# Patient Record
Sex: MALE | Race: WHITE | NOT HISPANIC OR LATINO | Employment: FULL TIME | URBAN - METROPOLITAN AREA
[De-identification: names, ages, dates, MRNs, and addresses within clinical notes are randomized per-mention and may not be internally consistent; named-entity substitution may affect disease eponyms.]

---

## 2018-06-03 ENCOUNTER — HOSPITAL ENCOUNTER (EMERGENCY)
Facility: MEDICAL CENTER | Age: 28
End: 2018-06-03
Attending: EMERGENCY MEDICINE
Payer: MEDICAID

## 2018-06-03 ENCOUNTER — HOSPITAL ENCOUNTER (EMERGENCY)
Facility: MEDICAL CENTER | Age: 28
End: 2018-06-03
Payer: MEDICAID

## 2018-06-03 VITALS
SYSTOLIC BLOOD PRESSURE: 131 MMHG | HEART RATE: 102 BPM | TEMPERATURE: 99.2 F | RESPIRATION RATE: 18 BRPM | DIASTOLIC BLOOD PRESSURE: 84 MMHG | OXYGEN SATURATION: 100 %

## 2018-06-03 VITALS
TEMPERATURE: 98.4 F | HEIGHT: 70 IN | SYSTOLIC BLOOD PRESSURE: 149 MMHG | RESPIRATION RATE: 14 BRPM | BODY MASS INDEX: 21.27 KG/M2 | WEIGHT: 148.59 LBS | HEART RATE: 104 BPM | DIASTOLIC BLOOD PRESSURE: 81 MMHG | OXYGEN SATURATION: 100 %

## 2018-06-03 DIAGNOSIS — F11.20 OPIATE DEPENDENCE (HCC): ICD-10-CM

## 2018-06-03 DIAGNOSIS — F19.20 ADDICTION TO DRUG (HCC): ICD-10-CM

## 2018-06-03 PROCEDURE — 302449 STATCHG TRIAGE ONLY (STATISTIC)

## 2018-06-03 PROCEDURE — 99284 EMERGENCY DEPT VISIT MOD MDM: CPT

## 2018-06-03 RX ORDER — BUPRENORPHINE 2 MG/1
2 TABLET SUBLINGUAL DAILY
Qty: 5 TAB | Refills: 0 | Status: SHIPPED | OUTPATIENT
Start: 2018-06-03 | End: 2018-06-08

## 2018-06-03 ASSESSMENT — PAIN SCALES - GENERAL
PAINLEVEL_OUTOF10: 0
PAINLEVEL_OUTOF10: 0

## 2018-06-03 NOTE — DISCHARGE PLANNING
"Call from  that pt has returned with script and was unable to fill it due to \"OSMIN\" number.  This CM looked on physician roster and did not see Dr. Kaiser was still on duty so took script to another MD who stated it was illegal to write for this script unless pain management or mental health.  Found pt out front and explained this and that he would need to be re seen.  In interim ERE MD informed this CM that Dr. Kaiser WAS [facility].  Went to speak to Dr. Kaiser and found pt, per Dr. Kaiser he did not need to be checked back in, updated triage RN and charge.  "

## 2018-06-03 NOTE — ED TRIAGE NOTES
"Luther Forrester  Chief Complaint   Patient presents with   • Medication Refill       Pt ambulatory to triage with above complaint.  Pt states he was discharged from [facility] Thursday and prescription was not sent to pharmacy. Pt states he went back to [facility] and they were not able to resend the prescription.  Pt states he was supposed to be discharged with 2 mg BID of Subutex.     No other complaints. Denies SI HI    Pt returned to lobby, educated on triage process, and to inform staff of any changes or concerns.      Blood Pressure: 149/81, Pulse: (!) 104, Respiration: 14, Temperature: 36.9 °C (98.4 °F), Height: 177.8 cm (5' 10\"), Weight: 67.4 kg (148 lb 9.4 oz), Pulse Oximetry: 100 %      "

## 2018-06-03 NOTE — ED PROVIDER NOTES
"ED Provider Note    CHIEF COMPLAINT  Chief Complaint   Patient presents with   • Medication Refill       HPI  Luther Forrester is a 27 y.o. male who presents asking for a refill on his Suboxone. The patient was released from [facility] on Thursday and he states he lost his prescription. The patient went back to [facility] and they could not reprint the prescription as the physician was not at their facility. The patient's being treated for heroin withdrawals. I did speak with [facility] and they have confirmed that the patient did come back for a new prescription and this could not be approved until the physician comes back tomorrow. The patient presents in an asymptomatic state except for anxiety.    REVIEW OF SYSTEMS  No hallucinations, no vomiting    PHYSICAL EXAM  VITAL SIGNS: /81   Pulse (!) 104   Temp 36.9 °C (98.4 °F) (Temporal)   Resp 14   Ht 1.778 m (5' 10\")   Wt 67.4 kg (148 lb 9.4 oz)   SpO2 100%   BMI 21.32 kg/m²   In general the patient appears anxious but in no acute distress  Pulmonary chest clear to auscultation bilaterally  Cardiovascular S1 and S2 with a slightly tachycardic rate  Psychiatric examination is within normal limits with no hallucinations nor homicidal nor suicidal ideation      COURSE & MEDICAL DECISION MAKING  Pertinent Labs & Imaging studies reviewed. (See chart for details)  Is a 27-year-old male who presents to the emergency department needing a refill on his Suboxone. I did speak with [facility] as mentioned above and I agree to give the patient 5 tablets to help him out through the next few days until he can get back into get a long-term prescription.    FINAL IMPRESSION  1. Heroin withdrawal        Disposition  The patient will be discharged in stable condition      Electronically signed by: Jose Gudaalupe Kaiser, 6/3/2018 2:40 PM      "

## 2018-06-03 NOTE — DISCHARGE INSTRUCTIONS
Drug Abuse, Frequently Asked Questions  Drug addiction is a complex brain disease. It is characterized by compulsive, at times uncontrollable, drug craving, seeking, and use that persists even in the face of extremely negative results. Drug seeking becomes compulsive, in large part as a result of the effects of prolonged drug use on brain functioning and, thus, on behavior. For many people, drug addiction becomes chronic, with relapses possible even after long periods of being off the drug.  HOW QUICKLY CAN I BECOME ADDICTED TO A DRUG?  There is no easy answer to this. If and how quickly you might become addicted to a drug depends on many factors including the biology of your body. All drugs are potentially harmful and may have life-threatening consequences associated with their use. There are also vast differences among individuals in sensitivity to various drugs. While one person may use a drug many times and suffer no ill effects, another person may be particularly vulnerable and overdose or developing a craving with the first use. There is no way of knowing in advance how someone may react.  HOW DO I KNOW IF SOMEONE IS ADDICTED TO DRUGS?  If a person is compulsively seeking and using a drug despite negative consequences (such as loss of job, debt, physical problems brought on by drug abuse, or family problems) then he or she is probably addicted. Those who screen for drug problems, such as physicians, have developed the CAGE questionnaire. These four simple questions can help detect substance abuse problems:  · Have you ever felt you ought to Cut down on your drinking/drug use?   · Have people ever Annoyed you by criticizing your drinking/drug use?   · Have you ever felt bad or Guilty about your drinking/drug use?   · Have you ever had a drink or taken a drug first thing in the morning to steady your nerves or get rid of a hangover (Eye-opener)?   WHAT ARE THE PHYSICAL SIGNS OF ABUSE OR ADDICTION?  The physical  "signs of abuse or addiction can vary depending on the person and the drug being abused. For example, someone who abuses marijuana may have a chronic cough or worsening of asthmatic conditions. THC, the chemical in marijuana responsible for producing its effects, is associated with weakening the immune system which makes the user more vulnerable to infections, such as pneumonia. Each drug has short-term and long-term physical effects. Stimulants like cocaine increase heart rate and blood pressure, whereas opioids like heroin may slow the heart rate and reduce breathing (respiration).   ARE THERE EFFECTIVE TREATMENTS FOR DRUG ADDICTION?  Drug addiction can be effectively treated with behavioral-based therapies and, for addiction to some drugs such as heroin or nicotine, medications may be used. Treatment may vary for each person depending on the type of drug(s) being used and multiple courses of treatment may be needed to achieve success. Research has revealed 13 basic principles that underlie effective drug addiction treatment. These are discussed in [employer]'s Principles of Drug Addiction Treatment: A Research-Based Guide.  WHERE CAN I FIND INFORMATION ABOUT DRUG TREATMENT PROGRAMS?  · For referrals to treatment programs, visit the Substance Abuse and Mental Health Services Administration online at http://findtreatment.sama.gov/.   · [employer] publishes an expanding series of treatment manuals, the \"clinical toolbox,\" that gives drug treatment providers research-based information for creating effective treatment programs.   WHAT IS DETOXIFICATION, OR \"DETOX\"?  Detoxification is the process of allowing the body to rid itself of a drug while managing the symptoms of withdrawal. It is often the first step in a drug treatment program and should be followed by treatment with a behavioral-based therapy and/or a medication, if available. Detox alone with no follow-up is not treatment.   WHAT IS WITHDRAWAL? HOW LONG DOES IT " LAST?  Withdrawal is the variety of symptoms that occur after use of some addictive drugs is reduced or stopped. Length of withdrawal and symptoms vary with the type of drug. For example, physical symptoms of heroin withdrawal may include restlessness, muscle and bone pain, insomnia, diarrhea, vomiting, and cold flashes. These physical symptoms may last for several days, but the general depression, or dysphoria (opposite of euphoria), that often accompanies heroin withdrawal, may last for weeks. In many cases withdrawal can be easily treated with medications to ease the symptoms. But treating withdrawal is not the same as treating addiction.   WHAT ARE THE COSTS OF DRUG ABUSE TO SOCIETY?  Beyond the raw numbers are other costs to society:  · Spread of infectious diseases such as HIV/AIDS and hepatitis C either through sharing of drug paraphernalia or unprotected sex.   · Deaths due to overdose or other complications from drug use.   · Effects on unborn children of pregnant drug users.   · Other effects such as crime and homelessness.   IF A PREGNANT WOMAN ABUSES DRUGS, DOES IT AFFECT THE FETUS?  · Many substances including alcohol, nicotine, and drugs of abuse can have negative effects on the developing fetus because they are transferred to the fetus across the placenta. For example, nicotine has been connected with premature birth and low birth weight, as has the use of cocaine. Scientific studies have shown that babies born to marijuana users were shorter, weighed less, and had smaller head sizes than those born to mothers who did not use the drug. Smaller babies are more likely to develop health problems.   · Whether a baby's health problems, if caused by a drug, will continue as the child grows, is not always known. Research does show that children born to mothers who used marijuana regularly during pregnancy may have trouble concentrating, when older. Our research continues to produce insights on the negative  effects of drug use on the fetus.   Document Released: 12/20/2004 Document Revised: 03/11/2013 Document Reviewed: 03/19/2010  [employer]® Patient Information ©2013 [employer].

## 2018-06-19 ENCOUNTER — OFFICE VISIT (OUTPATIENT)
Dept: MEDICAL GROUP | Facility: MEDICAL CENTER | Age: 28
End: 2018-06-19
Attending: FAMILY MEDICINE
Payer: MEDICAID

## 2018-06-19 VITALS
RESPIRATION RATE: 14 BRPM | DIASTOLIC BLOOD PRESSURE: 65 MMHG | HEIGHT: 70 IN | OXYGEN SATURATION: 98 % | SYSTOLIC BLOOD PRESSURE: 115 MMHG | HEART RATE: 76 BPM | WEIGHT: 160 LBS | TEMPERATURE: 98.8 F | BODY MASS INDEX: 22.9 KG/M2

## 2018-06-19 DIAGNOSIS — G47.00 INSOMNIA, UNSPECIFIED TYPE: ICD-10-CM

## 2018-06-19 DIAGNOSIS — F19.10 SUBSTANCE ABUSE (HCC): ICD-10-CM

## 2018-06-19 DIAGNOSIS — F41.1 GENERALIZED ANXIETY DISORDER: ICD-10-CM

## 2018-06-19 PROCEDURE — 99202 OFFICE O/P NEW SF 15 MIN: CPT | Performed by: FAMILY MEDICINE

## 2018-06-19 PROCEDURE — 99203 OFFICE O/P NEW LOW 30 MIN: CPT | Performed by: FAMILY MEDICINE

## 2018-06-19 RX ORDER — QUETIAPINE FUMARATE 50 MG/1
50 TABLET, FILM COATED ORAL NIGHTLY PRN
Qty: 30 TAB | Refills: 6 | Status: SHIPPED | OUTPATIENT
Start: 2018-06-19 | End: 2020-09-02

## 2018-06-19 RX ORDER — CITALOPRAM 20 MG/1
20 TABLET ORAL DAILY
Qty: 30 TAB | Refills: 3 | Status: SHIPPED | OUTPATIENT
Start: 2018-06-19 | End: 2022-12-23

## 2018-06-19 ASSESSMENT — ENCOUNTER SYMPTOMS
ABDOMINAL PAIN: 0
SPUTUM PRODUCTION: 0
COUGH: 0
NAUSEA: 0
SHORTNESS OF BREATH: 0
CHILLS: 0
DEPRESSION: 0
MUSCULOSKELETAL NEGATIVE: 1
FEVER: 0
NERVOUS/ANXIOUS: 1
MEMORY LOSS: 0
NEUROLOGICAL NEGATIVE: 1
INSOMNIA: 1
VOMITING: 0
PALPITATIONS: 0
HALLUCINATIONS: 0
EYES NEGATIVE: 1

## 2018-06-19 ASSESSMENT — PATIENT HEALTH QUESTIONNAIRE - PHQ9: CLINICAL INTERPRETATION OF PHQ2 SCORE: 0

## 2018-06-19 ASSESSMENT — LIFESTYLE VARIABLES: SUBSTANCE_ABUSE: 1

## 2018-06-19 NOTE — PROGRESS NOTES
"Subjective:      Luther Forrester is a 27 y.o. male who presents with Establish Care and Referral Needed            Patient 27-year-old male here to establish the clinic today. He has a recent history of substance abuse for which she had been in [place] for. While in [place] he had been started on Suboxone as well as Vivatrol. Since he has been discharged from the facility he has been unable to find a provider to continue filling his medication. Today will refer to psychiatry so pt can try to establish with a provider able to fill these medications for him.   He is also being treated for an anxiety disorder with celexa at 20 mg and using seroquel for sleep. He is not having any urges to harm himself or others. ER precautions given to pt in case he is having any worsening of his sxs.  Will continue to follow.    He has no other medical issues.    His only surgical history includes a tonsillectomy and dental extraction.     His family medical history is non contributory.    He is currently unemployed.    He smokes tobacco, denies drinking alcohol and is a former heroine user (stopped May 27)        Review of Systems   Constitutional: Negative for chills and fever.   HENT: Negative for hearing loss and tinnitus.    Eyes: Negative.    Respiratory: Negative for cough, sputum production and shortness of breath.    Cardiovascular: Negative for chest pain and palpitations.   Gastrointestinal: Negative for abdominal pain, nausea and vomiting.   Genitourinary: Negative.    Musculoskeletal: Negative.    Skin: Negative for rash.   Neurological: Negative.    Endo/Heme/Allergies: Negative.    Psychiatric/Behavioral: Positive for substance abuse. Negative for depression, hallucinations, memory loss and suicidal ideas. The patient is nervous/anxious and has insomnia.           Objective:     /65   Pulse 76   Temp 37.1 °C (98.8 °F)   Resp 14   Ht 1.778 m (5' 10\")   Wt 72.6 kg (160 lb)   SpO2 98%   BMI 22.96 " kg/m²      Physical Exam   Constitutional: He is oriented to person, place, and time. He appears well-developed and well-nourished.   HENT:   Head: Normocephalic and atraumatic.   Nose: Nose normal.   Mouth/Throat: Oropharynx is clear and moist.   Eyes: Conjunctivae and EOM are normal. Pupils are equal, round, and reactive to light.   Neck: Normal range of motion. Neck supple. No thyromegaly present.   Cardiovascular: Normal rate, regular rhythm and normal heart sounds.  Exam reveals no friction rub.    No murmur heard.  Pulmonary/Chest: Effort normal and breath sounds normal. No respiratory distress. He has no wheezes. He has no rales.   Abdominal: Soft. Bowel sounds are normal. He exhibits no distension. There is no tenderness.   Musculoskeletal: Normal range of motion.   Lymphadenopathy:     He has no cervical adenopathy.   Neurological: He is alert and oriented to person, place, and time.   Skin: Skin is warm and dry.   Psychiatric: He has a normal mood and affect. His behavior is normal.   Nursing note and vitals reviewed.              Assessment/Plan:     1. Substance abuse  Will refer to psychiatry for assistance in managing his suboxone and vivatrol. Will continue to follow.  - REFERRAL TO PSYCHIATRY    2. Insomnia, unspecified type  Will have him continue to take his seroquel as previously prescribed. Will continue to follow.  - quetiapine (SEROQUEL) 50 MG tablet; Take 1 Tab by mouth at bedtime as needed.  Dispense: 30 Tab; Refill: 6    3. Generalized anxiety disorder  Will have him continue to take his celexa as directed. Will continue to follow.  - citalopram (CELEXA) 20 MG Tab; Take 1 Tab by mouth every day.  Dispense: 30 Tab; Refill: 3

## 2018-11-09 ENCOUNTER — HOSPITAL ENCOUNTER (EMERGENCY)
Facility: MEDICAL CENTER | Age: 28
End: 2018-11-09
Attending: EMERGENCY MEDICINE
Payer: MEDICAID

## 2018-11-09 VITALS
DIASTOLIC BLOOD PRESSURE: 65 MMHG | WEIGHT: 147.71 LBS | SYSTOLIC BLOOD PRESSURE: 113 MMHG | OXYGEN SATURATION: 97 % | TEMPERATURE: 98 F | HEIGHT: 70 IN | HEART RATE: 87 BPM | RESPIRATION RATE: 16 BRPM | BODY MASS INDEX: 21.15 KG/M2

## 2018-11-09 DIAGNOSIS — F11.20 HEROIN DEPENDENCE (HCC): ICD-10-CM

## 2018-11-09 PROCEDURE — 99284 EMERGENCY DEPT VISIT MOD MDM: CPT

## 2018-11-09 ASSESSMENT — PAIN SCALES - GENERAL: PAINLEVEL_OUTOF10: 0

## 2018-11-10 NOTE — ED PROVIDER NOTES
"ED Provider Note    Scribed for ALCIRA Hernandez II* by Arsenio Griffiths. 11/9/2018  6:30 PM    Means of Arrival: Walk-in  History obtained by: Patient  Limitations: None    CHIEF COMPLAINT  Chief Complaint   Patient presents with   • Medical Clearance     to go to [facility]  Luther Forrester is a 28 y.o. male who presents to the Emergency Department for medical clearance prior to admittance to [facility] for Heroin and methamphetamine rehabilitation. He is currently on methadone and he will be on methadone while admitted to [facility]. He has been to rehabilitation in the past. He has not other complaints.    REVIEW OF SYSTEMS  Review of Systems   Constitutional:        Medical clearance   All other systems reviewed and are negative.  See [facility] for further details.     SOCIAL HISTORY  Social History     Social History Main Topics   • Smoking status: Current Every Day Smoker     Packs/day: 0.50   • Smokeless tobacco: Former User     Types: Chew     Quit date: 5/9/2018      Comment: occassionally on chew 1-2 times a day   • Alcohol use No   • Drug use: No      Comment: heroin, last use 2 days ago            SURGICAL HISTORY   has a past surgical history that includes dental extraction(s) and tonsillectomy.    CURRENT MEDICATIONS  Home Medications     Reviewed by Sarah Edgar R.N. (Registered Nurse) on 11/09/18 at 1727  Med List Status: Complete   Medication Last Dose Status   citalopram (CELEXA) 20 MG Tab 11/9/2018 Active   quetiapine (SEROQUEL) 50 MG tablet 11/9/2018 Active                ALLERGIES  No Known Allergies    PHYSICAL EXAM  VITAL SIGNS: /63   Pulse 85   Temp 36.5 °C (97.7 °F)   Resp 18   Ht 1.778 m (5' 10\")   Wt 67 kg (147 lb 11.3 oz)   SpO2 95%   BMI 21.19 kg/m²    Pulse ox interpretation: I interpret this pulse ox as normal.  Constitutional: Alert in no apparent distress. Sitting comfortably in bed.  HENT: Normocephalic, Atraumatic, Bilateral external ears " normal. Nose normal.   Eyes: Pupils are equal. Conjunctiva normal, non-icteric.   Lungs: No respiratory distress, regular respirations.  Skin: Warm, Dry, No erythema, No rash.   Neurologic: Alert, Grossly non-focal. No slurred speech. Moving extremities normally.   Psychiatric: Affect normal, Judgment normal, Mood normal, Appears appropriate and not intoxicated.     COURSE & MEDICAL DECISION MAKING  Pertinent Labs & Imaging studies reviewed. (See chart for details)    6:30 PM This is an emergent evaluation of a 28 y.o., male who presents for medical clearance to be able to go to [facility] for detox treatment.  He has no acute complaints.  He has a piece of paper that asks for current vital signs and to list any prescriptions that I plan on writing, and to check off any contraindicated over-the-counter meds that he may receive.  I completed the paperwork.  I am not going to write any prescriptions.  The over-the-counter medications that have available there seem appropriate for withdrawal.  He also is taking methadone less likely to have major withdrawal symptoms.  He is going to the rehab/detox facility for heroin dependence.    The patient will return for worsening symptoms and is stable at the time of discharge. The patient verbalizes understanding and will comply.    DISPOSITION:  Patient will be discharged to [facility] in stable condition.    FOLLOW UP:  Go to [facility] for detox. You are medically cleared            FINAL IMPRESSION  1. Heroin dependence (HCC)           Arsenio PIRES (Scribe), am scribing for, and in the presence of, VERO Hernandez II.    Electronically signed by: Arsenio Griffiths (Ashleyibe), 11/9/2018    Raudel PIRES II, M* personally performed the services described in this documentation, as scribed by Arsenio Griffiths in my presence, and it is both accurate and complete. E    The note accurately reflects work and decisions made by me.  Raudel ELI  Sciascia II  11/9/2018  8:16 PM

## 2018-11-10 NOTE — ED TRIAGE NOTES
Pt ambulated to triage with   Chief Complaint   Patient presents with   • Medical Clearance     to go to [facility]     Pt Informed regarding triage process and verbalized understanding to inform triage tech or RN for any changes in condition. Placed in lobby.

## 2018-11-10 NOTE — ED NOTES
Discharge instructions given. All questions answered. Pt to follow-up with rehab. Pt verbalized understanding. All belongings with pt. Pt self ambulatory to lobby. Pt educated not to drive while on narcotics.

## 2018-11-11 ENCOUNTER — HOSPITAL ENCOUNTER (EMERGENCY)
Facility: MEDICAL CENTER | Age: 28
End: 2018-11-11
Attending: EMERGENCY MEDICINE
Payer: MEDICAID

## 2018-11-11 VITALS
TEMPERATURE: 99.2 F | HEART RATE: 95 BPM | SYSTOLIC BLOOD PRESSURE: 120 MMHG | RESPIRATION RATE: 16 BRPM | DIASTOLIC BLOOD PRESSURE: 78 MMHG | BODY MASS INDEX: 20.81 KG/M2 | WEIGHT: 145.06 LBS | OXYGEN SATURATION: 100 %

## 2018-11-11 DIAGNOSIS — Z00.8 MEDICAL CLEARANCE FOR INCARCERATION: ICD-10-CM

## 2018-11-11 PROCEDURE — 99284 EMERGENCY DEPT VISIT MOD MDM: CPT

## 2018-11-11 RX ORDER — METHADONE HYDROCHLORIDE 10 MG/1
30 TABLET ORAL
COMMUNITY
End: 2020-09-02

## 2018-11-11 RX ORDER — HYDROXYZINE HYDROCHLORIDE 25 MG/1
25 TABLET, FILM COATED ORAL 3 TIMES DAILY PRN
Qty: 30 TAB | Refills: 0 | Status: SHIPPED | OUTPATIENT
Start: 2018-11-11 | End: 2018-12-04 | Stop reason: SDUPTHER

## 2018-11-11 ASSESSMENT — LIFESTYLE VARIABLES: DO YOU DRINK ALCOHOL: NO

## 2018-11-11 NOTE — ED TRIAGE NOTES
"Chief Complaint   Patient presents with   • Medical Clearance     Pt reports that he has not had his methadone since Friday, he has also not used meth or heroin since Friday. States that he is detoxing. States that he wants medical clearance to [facility] and came here for \"something for the detox.\"  Pt has no visible tremor, is PWD with VSS. NAD.    Pt was seen here for same 2 days ago per pt report.    Blood pressure 114/59, pulse 85, temperature 37.3 °C (99.2 °F), temperature source Temporal, resp. rate 17, weight 65.8 kg (145 lb 1 oz), SpO2 100 %.    Pt informed of wait times. Educated on triage process.  Asked to return to triage RN for any new or worsening of symptoms. Thanked for patience.        "

## 2018-11-11 NOTE — ED PROVIDER NOTES
ED Provider Note    CHIEF COMPLAINT  Chief Complaint   Patient presents with   • Medical Clearance       HPI  Luther Forrester is a 28 y.o. male who presents for medical clearance to go to [facility].  The patient states that he is already at [facility] and he came here on Friday.  He states he was not prescribed any medication.  They are concerned the patient is an acute heroin withdrawal and they sent him back into the hospital for repeat examination and clearance.  The patient states he does have a lot of anxiety as he is currently going through withdrawals.  He has not had vomiting.  He has not had any fevers.  He has not any hallucinations.    REVIEW OF SYSTEMS  See HPI for further details. All other systems are negative.     PAST MEDICAL HISTORY  History reviewed. No pertinent past medical history.    SOCIAL HISTORY  Social History     Social History   • Marital status: Single     Spouse name: N/A   • Number of children: N/A   • Years of education: N/A     Social History Main Topics   • Smoking status: Current Every Day Smoker     Packs/day: 0.50   • Smokeless tobacco: Former User     Types: Chew     Quit date: 5/9/2018      Comment: occassionally on chew 1-2 times a day   • Alcohol use No   • Drug use: No      Comment: heroin, and meth, last use fridat   • Sexual activity: Not on file     Other Topics Concern   • Not on file     Social History Narrative   • No narrative on file           PHYSICAL EXAM  VITAL SIGNS: /59   Pulse 85   Temp 37.3 °C (99.2 °F) (Temporal)   Resp 17   Wt 65.8 kg (145 lb 1 oz)   SpO2 100%   BMI 20.81 kg/m²   Constitutional: Well developed, Well nourished, No acute distress, Non-toxic appearance.   HENT: Normocephalic, Atraumatic, tympanic membranes are intact and nonerythematous bilaterally, Oropharynx moist without exudates or erythema, Nose normal.   Eyes: PERRLA, EOMI, Conjunctiva normal.  Neck: Supple without meningismus  Lymphatic: No lymphadenopathy noted.    Cardiovascular: Normal heart rate, Normal rhythm, No murmurs, No rubs, No gallops.   Thorax & Lungs: Normal breath sounds, No respiratory distress, No wheezing, No chest tenderness.   Abdomen: Bowel sounds normal, Soft, No tenderness, no rebound, no guarding, no distention, No masses, No pulsatile masses.   Skin: Warm, Dry, No erythema, No rash.   Back: No tenderness, No CVA tenderness.   Extremities: Atraumatic with symmetric distal pulses, No edema, No tenderness, No cyanosis, No clubbing.   Neurologic: Alert & oriented x 3, cranial nerves II through XII are intact, Normal motor function, Normal sensory function, No focal deficits noted.   Psychiatric: Affect normal, Judgment normal, Mood normal.     COURSE & MEDICAL DECISION MAKING  Pertinent Labs & Imaging studies reviewed. (See chart for details)  This a 28-year-old male who presents the emerge part for medical clearance.  He does not clinically appear ill at this time.  I do not appreciate any significant evidence of withdrawal.  The patient will receive a prescription for Atarax to help him sleep.  He will be discharged back to [facility] and is currently medically cleared.    FINAL IMPRESSION  1.  Heroin abuse   2.  Medical clearance        Disposition  The patient will be discharged in stable condition    Electronically signed by: Jose Guadalupe Kaiser, 11/11/2018 2:39 PM

## 2018-11-14 ENCOUNTER — HOSPITAL ENCOUNTER (EMERGENCY)
Facility: MEDICAL CENTER | Age: 28
End: 2018-11-14
Attending: EMERGENCY MEDICINE
Payer: MEDICAID

## 2018-11-14 VITALS
SYSTOLIC BLOOD PRESSURE: 115 MMHG | RESPIRATION RATE: 16 BRPM | TEMPERATURE: 98 F | DIASTOLIC BLOOD PRESSURE: 67 MMHG | OXYGEN SATURATION: 100 % | BODY MASS INDEX: 21.35 KG/M2 | WEIGHT: 148.81 LBS | HEART RATE: 80 BPM

## 2018-11-14 DIAGNOSIS — L02.91 ABSCESS: ICD-10-CM

## 2018-11-14 PROCEDURE — 700101 HCHG RX REV CODE 250: Performed by: EMERGENCY MEDICINE

## 2018-11-14 PROCEDURE — 303977 HCHG I & D

## 2018-11-14 PROCEDURE — 99284 EMERGENCY DEPT VISIT MOD MDM: CPT

## 2018-11-14 RX ORDER — SULFAMETHOXAZOLE AND TRIMETHOPRIM 800; 160 MG/1; MG/1
1 TABLET ORAL ONCE
Status: COMPLETED | OUTPATIENT
Start: 2018-11-14 | End: 2018-11-14

## 2018-11-14 RX ORDER — LIDOCAINE HYDROCHLORIDE AND EPINEPHRINE BITARTRATE 20; .01 MG/ML; MG/ML
INJECTION, SOLUTION SUBCUTANEOUS
Status: DISPENSED
Start: 2018-11-14 | End: 2018-11-14

## 2018-11-14 RX ORDER — SULFAMETHOXAZOLE AND TRIMETHOPRIM 800; 160 MG/1; MG/1
TABLET ORAL
Status: DISPENSED
Start: 2018-11-14 | End: 2018-11-14

## 2018-11-14 RX ORDER — CEPHALEXIN 500 MG/1
500 CAPSULE ORAL ONCE
Status: COMPLETED | OUTPATIENT
Start: 2018-11-14 | End: 2018-11-14

## 2018-11-14 RX ORDER — CEPHALEXIN 500 MG/1
CAPSULE ORAL
Status: DISPENSED
Start: 2018-11-14 | End: 2018-11-14

## 2018-11-14 RX ORDER — LIDOCAINE HYDROCHLORIDE AND EPINEPHRINE BITARTRATE 20; .01 MG/ML; MG/ML
10 INJECTION, SOLUTION SUBCUTANEOUS ONCE
Status: COMPLETED | OUTPATIENT
Start: 2018-11-14 | End: 2018-11-14

## 2018-11-14 RX ADMIN — LIDOCAINE HYDROCHLORIDE AND EPINEPHRINE 10 ML: 20; 10 INJECTION, SOLUTION INFILTRATION; PERINEURAL at 10:30

## 2018-11-14 ASSESSMENT — PAIN SCALES - GENERAL: PAINLEVEL_OUTOF10: 3

## 2018-11-14 NOTE — ED TRIAGE NOTES
Pt has redness and swelling to right back of forearm noted last night. Hx of abscess from heroin use.   Temp 98, pulse 83, respirations 16, /70 and pulse ox 99%.

## 2018-11-14 NOTE — ED TRIAGE NOTES
Downtime chartin  erp at bedside.  1015  Medicated per mar order. Allergies verified.  I&D tray/cart at bedside. Lido given to erp.  1040  I&D done by erp, packing placed. Dressing applied by ed tech. Kept clean dry and intact.

## 2018-11-14 NOTE — ED PROVIDER NOTES
ED Provider Note    Scribed for Jose Armando Bettencourt M.D. by Michelle Archer. 11/14/2018  9:45 AM    Primary care provider: Aman Brantley M.D.  Means of arrival: Walk in  History obtained from: Patient  History limited by: None    CHIEF COMPLAINT  •  Abscess    HPI  Luther Forrester is a 28 y.o. male who presents to the Emergency Department for an abscess which developed two days ago.  History of intravenous heroin use and last injected two weeks ago. He is currently in a rehab program at [facility].  He developed an abscess to his right forearm two days ago. He has previously injected drugs in this area.  He states the abscess is gradually increasing in size and surrounding erythema.  No alleviating or exacerbating factors were reported. Negative for fevers, chills or vomiting.       REVIEW OF SYSTEMS  Pertinent positives include abscess to right forearm with surrounding erythema. Pertinent negatives include fevers, chills or vomiting. See HPI for further details. E.       PAST MEDICAL HISTORY  History of drug use.       SURGICAL HISTORY  Patient has a past surgical history that includes dental extraction(s) and tonsillectomy.      SOCIAL HISTORY  Social History   Substance Use Topics   • Smoking status: Current Every Day Smoker     Packs/day: 0.50   • Smokeless tobacco: Former User     Types: Chew     Quit date: 5/9/2018      Comment: occassionally on chew 1-2 times a day   • Alcohol use No      History   Drug Use No     Comment: heroin, and meth, last use fridat       FAMILY HISTORY  History reviewed. No pertinent family history.       CURRENT MEDICATIONS  Home Medications     Reviewed by Carrie Dahl R.N. (Registered Nurse) on 11/14/18 at 1117  Med List Status: <None>   Medication Last Dose Status   citalopram (CELEXA) 20 MG Tab 11/9/2018 Active   hydrOXYzine HCl (ATARAX) 25 MG Tab  Active   methadone (DOLOPHINE) 10 MG Tab  Active   quetiapine (SEROQUEL) 50 MG tablet 11/9/2018 Active                 ALLERGIES  None      PHYSICAL EXAM  VITAL SIGNS: /67   Pulse 80   Temp 36.7 °C (98 °F)   Resp 16   Wt 67.5 kg (148 lb 13 oz)   SpO2 100%   BMI 21.35 kg/m²     Nursing note and vitals reviewed.    Constitutional: Well-developed and well-nourished. No distress.   HENT: Head is normocephalic and atraumatic. Oropharynx is clear and moist without exudate or erythema.   Eyes: Pupils are equal, round, and reactive to light. Conjunctiva are normal.   Cardiovascular: Normal rate and regular rhythm. No murmur heard. Normal radial pulses.  Pulmonary/Chest: Breath sounds normal. No wheezes or rales.   Abdominal: Soft and non-tender. No distention    Musculoskeletal: Extremities exhibit normal range of motion without edema or tenderness.   Neurological: Awake, alert and oriented to person, place, and time. No focal deficits noted.  Skin: Skin is warm and dry. No rash. 3 x 3 cm abscess over the dorsum of the right forearm with associated fluctuance. Minimal overlying erythema.   Psychiatric: Normal mood and affect. Appropriate for clinical situation.      DIAGNOSTIC STUDIES / PROCEDURES    Incision and Drainage Procedure Note    Indication: Abscess    Procedure: The patient was positioned appropriately and the skin over the incision site was prepped with betadine and draped in a sterile fashion. Local anesthesia was obtained by infiltration using 2% Lidocaine with epinephrine.  An incision was then made over the greatest area of fluctuance and approximately 3 cc of purulent material was expressed. Loculations were not present. The drainage cavity was then packed with sterile gauze.     The patient tolerated the procedure well.    Complications: None      COURSE & MEDICAL DECISION MAKING  Pertinent Labs & Imaging studies reviewed. (See chart for details)    Differential Diagnoses include but are not limited to: abscess    9:45 AM Patient was evaluated at bedside. He presents for an abscess. On exam, he has a 3 x  3 cm abscess over the dorsum of the right forearm.     10:25 AM Incision and drainage procedure was completed at bedside as noted above. He will be treated with one tablet of 800-160 mg of Bactrim and 500 mg of Keflex PO in the ED.     Discharge plan was discussed with the patient and includes following up with his PCP as needed. Return to the ED in two days for packing removal. Return sooner for increased pain, erythema, discharge from the abscess; fevers or vomiting. He will be discharged with a prescription for Bactrim and Keflex. He verbalized his understanding and agreement of these instructions.     Patient is stable at the time of discharge. Vital signs were reviewed: /67   Pulse 80   Temp 36.7 °C (98 °F)   Resp 16   Wt 67.5 kg (148 lb 13 oz)   SpO2 100%   BMI 21.35 kg/m²        HTN/IDDM FOLLOW UP:  The patient is referred to a primary physician for blood pressure management, diabetic screening, and for all other preventive health concerns        DISPOSITION  Patient will be discharged home in stable condition.      FOLLOW UP  [facility], Emergency Dept  [address] [address]  [phone]    If symptoms worsen    [facility], Emergency Dept  [address] [address]  [phone]  In 2 days  packing removal      OUTPATIENT MEDICATIONS  Bactrim  Keflex      DIAGNOSIS  1. Abscess    2.  History of IV drug use      Michelle PIRES (Scribe), am scribing for, and in the presence of, Jose Armando Bettencourt M.D.    Electronically signed by: Michelle Archer (Ashleyibmaria fernanda), 11/14/2018    Jose Armando PIRES M.D., personally performed the services described in this documentation, as scribed by Michelle Archer in my presence, and it is both accurate and complete. E.     The note accurately reflects work and decisions made by me.  Jose Armando Bettencourt  11/14/2018  3:40 PM

## 2018-11-14 NOTE — ED TRIAGE NOTES
Discharge instructions given to pt including returning in 2 days for packing removal in 2 days or returning if no improvement of symptoms or to return if worse. Questions answered by RN. Denies any new complaints. Discharged w/stable vitals and able to ambulate w/steady gait. Prescriptions x 2 given to pt.

## 2018-11-16 ENCOUNTER — HOSPITAL ENCOUNTER (EMERGENCY)
Facility: MEDICAL CENTER | Age: 28
End: 2018-11-16
Attending: EMERGENCY MEDICINE
Payer: MEDICAID

## 2018-11-16 VITALS
WEIGHT: 154.32 LBS | DIASTOLIC BLOOD PRESSURE: 70 MMHG | OXYGEN SATURATION: 98 % | BODY MASS INDEX: 22.09 KG/M2 | HEART RATE: 77 BPM | TEMPERATURE: 97.8 F | SYSTOLIC BLOOD PRESSURE: 122 MMHG | RESPIRATION RATE: 14 BRPM | HEIGHT: 70 IN

## 2018-11-16 DIAGNOSIS — Z51.89 ENCOUNTER FOR WOUND RE-CHECK: ICD-10-CM

## 2018-11-16 PROCEDURE — 99283 EMERGENCY DEPT VISIT LOW MDM: CPT

## 2018-11-16 RX ORDER — CEPHALEXIN 500 MG/1
500 CAPSULE ORAL 4 TIMES DAILY
Qty: 40 CAP | Refills: 0 | Status: SHIPPED | OUTPATIENT
Start: 2018-11-16 | End: 2018-11-26

## 2018-11-16 ASSESSMENT — PAIN SCALES - GENERAL: PAINLEVEL_OUTOF10: 0

## 2018-11-16 NOTE — ED PROVIDER NOTES
ED Provider Note    Scribed for Bina Lock M.D. by Jalil Cruz. 11/16/2018  9:16 AM    Primary care provider: Aman Brantley M.D.  Means of arrival: Walk in  History obtained from: Patient  History limited by: None    CHIEF COMPLAINT  Chief Complaint   Patient presents with   • Wound Check     abscess to RT forearm with packing       HPI  Luther Forrester is a 28 y.o. male who presents to the Emergency Department for wound check of an abscess on his right forearm. Patient states the abscess was the result of drug use. He had the abscess drained and packed a two days ago and was prescribed bactrim and keflex. He believes the abscess area is improving. He otherwise denies any other associated symptoms at this time. No complaints of recent fevers. Tetanus is up-to-date, per patient.       REVIEW OF SYSTEMS  Endocrine: no fevers  SKIN: Positive abscess right forearm  See history of present illness.     PAST MEDICAL HISTORY   History of drug use.    SURGICAL HISTORY   has a past surgical history that includes dental extraction(s) and tonsillectomy.    SOCIAL HISTORY  Social History   Substance Use Topics   • Smoking status: Current Every Day Smoker     Packs/day: 0.50   • Smokeless tobacco: Former User     Types: Chew     Quit date: 5/9/2018      Comment: occassionally on chew 1-2 times a day   • Alcohol use No      History   Drug Use No     Comment: heroin, and meth, last use fridat       FAMILY HISTORY  No pertinent family history reported.     CURRENT MEDICATIONS  Home Medications     Reviewed by Niles Kong R.N. (Registered Nurse) on 11/16/18 at 0851  Med List Status: Partial   Medication Last Dose Status   Cephalexin (KEFLEX PO) 11/16/2018 Active   citalopram (CELEXA) 20 MG Tab 11/16/2018 Active   hydrOXYzine HCl (ATARAX) 25 MG Tab prn Active   methadone (DOLOPHINE) 10 MG Tab 11/16/2018 Active   quetiapine (SEROQUEL) 50 MG tablet 11/15/2018 Active   Sulfamethoxazole-Trimethoprim (BACTRIM PO)  "11/16/2018 Active                ALLERGIES  No Known Allergies    PHYSICAL EXAM  VITAL SIGNS: /65   Pulse 74   Temp 36.6 °C (97.8 °F) (Temporal)   Resp 14   Ht 1.778 m (5' 10\")   Wt 70 kg (154 lb 5.2 oz)   SpO2 98%   BMI 22.14 kg/m²   Constitutional: Well developed, Well nourished, No acute distress, Non-toxic appearance.   HEENT: Normocephalic, Atraumatic,  external ears normal  Eyes: PERRL, EOMI, Conjunctiva normal, No discharge.   Skin: Warm, Dry, No rash. Right forearm with 1 cm abscess, some serosanguinous drainage and mild surrounding erythema, no fluctuance or tenderness.  Extremities: Equal, intact distal pulses, No cyanosis, No tenderness.   Musculoskeletal: Good range of motion in all major joints. No tenderness to palpation or major deformities noted.   Neurologic: Alert & awake, no focal deficits. Distal neurovascularly intact, normal sensation.   Psychiatric: Affect normal      COURSE & MEDICAL DECISION MAKING  Nursing notes, VS, PMSFHx reviewed in chart.     9:16 AM - Patient seen and examined at bedside. Will remove packing. Will not repack, as the area appears to be healing normally. Will correct patient's prescription for keflex. The patient will be discharged with instructions regarding supportive care and medications including Keflex. Instructions were given for follow-up. Discussed indications for seeking immediate medical attention. Patient was given the opportunity for questions. The patient understands and agrees.         The patient will return for new or worsening symptoms and is stable at the time of discharge.    The patient is referred to a primary physician for blood pressure management, diabetic screening, and for all other preventative health concerns.      DISPOSITION:  Patient will be discharged home in stable condition.    FOLLOW UP:  [address] [address] [phone]  Call in 3 days  to establish care, for " recheck      OUTPATIENT MEDICATIONS:  Discharge Medication List as of 11/16/2018  9:33 AM           FINAL IMPRESSION  1. Encounter for wound re-check          IJalil (Scribe), am scribing for, and in the presence of, Bina Lock M.D..    Electronically signed by: Jalil Cruz (Scribe), 11/16/2018    IBina M.D. personally performed the services described in this documentation, as scribed by Jalil Cruz in my presence, and it is both accurate and complete. E    The note accurately reflects work and decisions made by me.  Bina Lock  11/16/2018  3:03 PM

## 2018-11-16 NOTE — ED TRIAGE NOTES
"Chief Complaint   Patient presents with   • Wound Check     abscess to RT forearm with packing     Pt amb to triage for above. Reports he hasn't changed the dressing as he didn't have any supplies to do so. Denies fevers. Taking bactrim and keflex as rx'd.     Pt returned to [facility]. Educated on triage process and to inform staff of any changes.     /65   Pulse 74   Temp 36.6 °C (97.8 °F) (Temporal)   Resp 14   Ht 1.778 m (5' 10\")   Wt 70 kg (154 lb 5.2 oz)   SpO2 98%   BMI 22.14 kg/m²     "

## 2018-12-04 ENCOUNTER — OFFICE VISIT (OUTPATIENT)
Dept: MEDICAL GROUP | Facility: MEDICAL CENTER | Age: 28
End: 2018-12-04
Attending: FAMILY MEDICINE
Payer: MEDICAID

## 2018-12-04 VITALS
DIASTOLIC BLOOD PRESSURE: 70 MMHG | HEIGHT: 70 IN | TEMPERATURE: 98.2 F | HEART RATE: 96 BPM | WEIGHT: 160 LBS | SYSTOLIC BLOOD PRESSURE: 120 MMHG | BODY MASS INDEX: 22.9 KG/M2 | OXYGEN SATURATION: 99 % | RESPIRATION RATE: 14 BRPM

## 2018-12-04 DIAGNOSIS — F41.9 ANXIETY: ICD-10-CM

## 2018-12-04 DIAGNOSIS — F19.11 HISTORY OF SUBSTANCE ABUSE (HCC): ICD-10-CM

## 2018-12-04 DIAGNOSIS — F39 MOOD DISORDER (HCC): ICD-10-CM

## 2018-12-04 PROCEDURE — 99212 OFFICE O/P EST SF 10 MIN: CPT | Performed by: FAMILY MEDICINE

## 2018-12-04 PROCEDURE — 99214 OFFICE O/P EST MOD 30 MIN: CPT | Performed by: FAMILY MEDICINE

## 2018-12-04 RX ORDER — HYDROXYZINE HYDROCHLORIDE 25 MG/1
25-50 TABLET, FILM COATED ORAL 3 TIMES DAILY PRN
Qty: 60 TAB | Refills: 0 | Status: SHIPPED | OUTPATIENT
Start: 2018-12-04 | End: 2018-12-28

## 2018-12-04 ASSESSMENT — ENCOUNTER SYMPTOMS
EYES NEGATIVE: 1
CHILLS: 0
DEPRESSION: 1
HALLUCINATIONS: 0
MEMORY LOSS: 0
COUGH: 0
VOMITING: 0
INSOMNIA: 1
NAUSEA: 0
PALPITATIONS: 0
NEUROLOGICAL NEGATIVE: 1
FEVER: 0
SHORTNESS OF BREATH: 0
MUSCULOSKELETAL NEGATIVE: 1
NERVOUS/ANXIOUS: 1
ABDOMINAL PAIN: 0
SPUTUM PRODUCTION: 0

## 2018-12-04 ASSESSMENT — LIFESTYLE VARIABLES: SUBSTANCE_ABUSE: 1

## 2018-12-04 NOTE — LETTER
December 4, 2018       Patient: Luther Forrester   YOB: 1990   Date of Visit: 12/4/2018         To Whom It May Concern:    It is my medical opinion that Luther Forrester does not need to take lexapro. He is currently on celexa. Which has been working well for him.    If you have any questions or concerns, please don't hesitate to call [phone]          Sincerely,          Aman Brantley M.D.  Electronically Signed

## 2018-12-04 NOTE — PROGRESS NOTES
Subjective:      Luther Forrester is a 28 y.o. male who presents with Medication Refill            Patient 28-year-old male here for follow-up on his mood disorder as well as history of substance abuse.    He is currently a patient at [facility] being treated for his history of substance abuse.  Patient states that he needs a letter written to [facility] called stating that he is no longer taking his Lexapro which had been replaced by Celexa.  Patient states that his Lexapro was giving him worsened anxiety, so he had been switched to Celexa which has been working well to manage his mood disorder.  When he checked into the facility he had a prescription of Lexapro with him which was listed on his medication list.  Since he has not been taking it he is requesting that a letter be written to discontinue the medication.  Since he is not currently seeing a psychiatrist a referral to psychiatry has been made today.  He has been doing well with his current medications, but the referral will be made in case he needs to have any further evaluations or adjustments made to his current medications.  We will continue to follow.    He would also like to have his Atarax prescription increased, since he has been having increased difficulty falling asleep at night.  He states that the Atarax does help him become drowsy in order to fall asleep.  Discussed getting a sleep study if he continues to have issues with sleep.  We will continue to follow.       Current medications, allergies, and problem list reviewed with patient and updated in EPIC.          Review of Systems   Constitutional: Negative for chills and fever.   HENT: Negative for hearing loss and tinnitus.    Eyes: Negative.    Respiratory: Negative for cough, sputum production and shortness of breath.    Cardiovascular: Negative for chest pain and palpitations.   Gastrointestinal: Negative for abdominal pain, nausea and vomiting.   Musculoskeletal: Negative.    Skin:  "Negative for rash.   Neurological: Negative.    Psychiatric/Behavioral: Positive for depression and substance abuse. Negative for hallucinations, memory loss and suicidal ideas. The patient is nervous/anxious and has insomnia.           Objective:     /70 (BP Location: Left arm, Patient Position: Sitting)   Pulse 96   Temp 36.8 °C (98.2 °F)   Resp 14   Ht 1.778 m (5' 10\")   Wt 72.6 kg (160 lb)   SpO2 99%   BMI 22.96 kg/m²      Physical Exam   Constitutional: He is oriented to person, place, and time. He appears well-developed and well-nourished.   HENT:   Head: Normocephalic and atraumatic.   Cardiovascular: Normal rate, regular rhythm and normal heart sounds.  Exam reveals no friction rub.    No murmur heard.  Pulmonary/Chest: Effort normal and breath sounds normal. No respiratory distress. He has no wheezes. He has no rales.   Abdominal: Soft. Bowel sounds are normal. He exhibits no distension. There is no tenderness.   Neurological: He is alert and oriented to person, place, and time.   Skin: Skin is warm and dry.   Psychiatric: He has a normal mood and affect. His behavior is normal.   Nursing note and vitals reviewed.              Assessment/Plan:     1. Anxiety  Will have him continue to take his medications as directed until he is able to be evaluated by psychiatry. His medications have been working well for him and he is not having any urges to hurt himself or others. Will continue to follow.  - REFERRAL TO PSYCHIATRY  - hydrOXYzine HCl (ATARAX) 25 MG Tab; Take 1-2 Tabs by mouth 3 times a day as needed for Anxiety.  Dispense: 60 Tab; Refill: 0    2. History of substance abuse  He is currently being treated at [facility]. Will continue to follow.  - REFERRAL TO PSYCHIATRY  - hydrOXYzine HCl (ATARAX) 25 MG Tab; Take 1-2 Tabs by mouth 3 times a day as needed for Anxiety.  Dispense: 60 Tab; Refill: 0    3. Mood disorder (HCC)  See above plan.  - REFERRAL TO PSYCHIATRY  - hydrOXYzine HCl (ATARAX) 25 " MG Tab; Take 1-2 Tabs by mouth 3 times a day as needed for Anxiety.  Dispense: 60 Tab; Refill: 0

## 2018-12-28 ENCOUNTER — HOSPITAL ENCOUNTER (OUTPATIENT)
Facility: MEDICAL CENTER | Age: 28
End: 2018-12-28
Attending: NURSE PRACTITIONER
Payer: MEDICAID

## 2018-12-28 ENCOUNTER — OFFICE VISIT (OUTPATIENT)
Dept: URGENT CARE | Facility: CLINIC | Age: 28
End: 2018-12-28
Payer: MEDICAID

## 2018-12-28 VITALS
TEMPERATURE: 98.9 F | OXYGEN SATURATION: 90 % | DIASTOLIC BLOOD PRESSURE: 63 MMHG | SYSTOLIC BLOOD PRESSURE: 114 MMHG | HEIGHT: 70 IN | WEIGHT: 160 LBS | HEART RATE: 92 BPM | BODY MASS INDEX: 22.9 KG/M2

## 2018-12-28 DIAGNOSIS — L02.414 ABSCESS OF ARM, LEFT: ICD-10-CM

## 2018-12-28 DIAGNOSIS — F19.90 IV DRUG USER: ICD-10-CM

## 2018-12-28 LAB
FORWARD REASON: SPWHY: NORMAL
FORWARDED TO LAB: SPWHR: NORMAL
SPECIMEN SENT: SPWT1: NORMAL

## 2018-12-28 PROCEDURE — 10061 I&D ABSCESS COMP/MULTIPLE: CPT | Performed by: NURSE PRACTITIONER

## 2018-12-28 RX ORDER — CEPHALEXIN 500 MG/1
500 CAPSULE ORAL 3 TIMES DAILY
Qty: 30 CAP | Refills: 0 | Status: SHIPPED | OUTPATIENT
Start: 2018-12-28 | End: 2019-01-07

## 2018-12-28 RX ORDER — SULFAMETHOXAZOLE AND TRIMETHOPRIM 800; 160 MG/1; MG/1
1 TABLET ORAL
Refills: 0 | COMMUNITY
Start: 2018-11-14 | End: 2018-12-28

## 2018-12-28 RX ORDER — DOXYCYCLINE HYCLATE 100 MG
100 TABLET ORAL 2 TIMES DAILY
Qty: 20 TAB | Refills: 0 | Status: SHIPPED | OUTPATIENT
Start: 2018-12-28 | End: 2019-01-03

## 2018-12-28 RX ORDER — SULFAMETHOXAZOLE AND TRIMETHOPRIM 800; 160 MG/1; MG/1
1 TABLET ORAL 2 TIMES DAILY
Qty: 20 TAB | Refills: 0 | Status: CANCELLED | OUTPATIENT
Start: 2018-12-28 | End: 2019-01-07

## 2018-12-28 RX ORDER — BUPRENORPHINE HYDROCHLORIDE, NALOXONE HYDROCHLORIDE 8; 2 MG/1; MG/1
FILM, SOLUBLE BUCCAL; SUBLINGUAL
Refills: 0 | COMMUNITY
Start: 2018-10-18 | End: 2020-09-02

## 2018-12-28 ASSESSMENT — ENCOUNTER SYMPTOMS
FEVER: 0
NAUSEA: 0
MYALGIAS: 0
DIZZINESS: 0
SHORTNESS OF BREATH: 0
CHILLS: 0
EYE PAIN: 0
VOMITING: 0
VERTIGO: 0
ABDOMINAL PAIN: 0
SORE THROAT: 0

## 2018-12-28 NOTE — PROGRESS NOTES
"Subjective:   Luther Forrester is a 28 y.o. male who presents for Abscess (left upper arm )  This is a 28-year-old male who presents clinic today for evaluation of an abscess of his left upper arm that developed approximately 1 week ago.  Patient is an IV drug user in which he used yesterday most recent.  Patient verbalizing that after having abscess drained today he is planning to have himself admitted at [facility] for rehab.  He denies any fever, chills, nausea, vomiting.      Other   This is a new problem. The current episode started in the past 7 days. The problem occurs constantly. The problem has been unchanged. Pertinent negatives include no abdominal pain, chest pain, chills, congestion, fever, myalgias, nausea, rash, sore throat, vertigo or vomiting. Associated symptoms comments: Left upper arm, . Nothing aggravates the symptoms. He has tried nothing for the symptoms. The treatment provided no relief.     Review of Systems   Constitutional: Negative for chills and fever.   HENT: Negative for congestion and sore throat.    Eyes: Negative for pain.   Respiratory: Negative for shortness of breath.    Cardiovascular: Negative for chest pain.   Gastrointestinal: Negative for abdominal pain, nausea and vomiting.   Genitourinary: Negative for hematuria.   Musculoskeletal: Negative for myalgias.   Skin: Negative for rash.        Abscess of left upper arm   Neurological: Negative for dizziness and vertigo.     No Known Allergies   Objective:   /63   Pulse 92   Temp 37.2 °C (98.9 °F)   Ht 1.778 m (5' 10\")   Wt 72.6 kg (160 lb)   SpO2 90%   BMI 22.96 kg/m²   Physical Exam   Constitutional: He is oriented to person, place, and time. He appears well-developed and well-nourished. No distress.   HENT:   Head: Normocephalic and atraumatic.   Eyes: Pupils are equal, round, and reactive to light. Conjunctivae and EOM are normal.   Cardiovascular: Normal rate and regular rhythm.    No murmur " "heard.  Pulmonary/Chest: Effort normal and breath sounds normal. No respiratory distress.   Abdominal: Soft. He exhibits no distension. There is no tenderness.   Neurological: He is alert and oriented to person, place, and time. He has normal reflexes. No sensory deficit.   Skin: Skin is warm and dry. Lesion noted. There is erythema.        Psychiatric: He has a normal mood and affect.         Assessment/Plan:     1. Abscess of arm, left  cephALEXin (KEFLEX) 500 MG Cap    doxycycline (VIBRAMYCIN) 100 MG Tab    ANAEROBIC/AEROBIC/GRAM STAIN   2. IV drug user  cephALEXin (KEFLEX) 500 MG Cap    doxycycline (VIBRAMYCIN) 100 MG Tab    ANAEROBIC/AEROBIC/GRAM STAIN       Procedure: Incision and Drainage  -Risks, benefits, and alternatives discussed. Risks including infection, bleeding, nerve damage, and poor cosmetic outcome  -Sterile technique throughout  -Local anesthesia with 1% lidocaine with epinephrine  -Incision with #11 blade into fluctuant area with purulent material expressed  -Culture obtained and packaged for lab  -Cavity probed and any loculations bluntly taken down with hemostat  -Irrigated copiously with NS  -Packed with 1/4\" gauze  -Minimal bleeding with good hemostasis achieved  -The patient tolerated the procedure well    Will start patient on doxycycline and Keflex at this time.  Patient verbalizing that Bactrim makes him sick when he takes it.  Patient verbalizing that he is going to [facility] to check himself into rehab.  Encouraged wound care, will need to packing removed in 48 hours.  Advised to have clinical staff at [facility] evaluate wound at that time.  Patient given precautionary s/sx that mandate immediate follow up and evaluation in the ED. Advised of risks of not doing so.    DDX, Supportive care, and indications for immediate follow-up discussed with patient.    Instructed to return to clinic or nearest emergency department if we are not available for any change in condition, further " concerns, or worsening of symptoms.    The patient demonstrated a good understanding and agreed with the treatment plan.

## 2019-01-03 ENCOUNTER — TELEPHONE (OUTPATIENT)
Dept: MEDICAL GROUP | Facility: MEDICAL CENTER | Age: 29
End: 2019-01-03

## 2019-01-03 RX ORDER — DOXYCYCLINE 100 MG/1
100 CAPSULE ORAL 2 TIMES DAILY
Qty: 14 CAP | Refills: 0 | Status: SHIPPED | OUTPATIENT
Start: 2019-01-03 | End: 2020-09-02

## 2019-01-03 NOTE — TELEPHONE ENCOUNTER
Pt's prescription for doxycycline is requiring a prior auth. Would you like me to pursue with a prior auth or change medication.

## 2020-05-26 ENCOUNTER — APPOINTMENT (OUTPATIENT)
Dept: URGENT CARE | Facility: CLINIC | Age: 30
End: 2020-05-26
Payer: MEDICAID

## 2020-09-02 ENCOUNTER — HOSPITAL ENCOUNTER (EMERGENCY)
Facility: MEDICAL CENTER | Age: 30
End: 2020-09-03
Attending: EMERGENCY MEDICINE
Payer: MEDICAID

## 2020-09-02 DIAGNOSIS — L02.413 ABSCESS OF RIGHT ARM: ICD-10-CM

## 2020-09-02 DIAGNOSIS — Z76.89 ENCOUNTER FOR INCISION AND DRAINAGE PROCEDURE: ICD-10-CM

## 2020-09-02 DIAGNOSIS — L03.113 RIGHT ARM CELLULITIS: ICD-10-CM

## 2020-09-02 DIAGNOSIS — F11.10 HEROIN ABUSE (HCC): ICD-10-CM

## 2020-09-02 PROCEDURE — 96365 THER/PROPH/DIAG IV INF INIT: CPT

## 2020-09-02 PROCEDURE — 700111 HCHG RX REV CODE 636 W/ 250 OVERRIDE (IP): Performed by: EMERGENCY MEDICINE

## 2020-09-02 PROCEDURE — 96375 TX/PRO/DX INJ NEW DRUG ADDON: CPT

## 2020-09-02 PROCEDURE — 85025 COMPLETE CBC W/AUTO DIFF WBC: CPT

## 2020-09-02 PROCEDURE — 700101 HCHG RX REV CODE 250: Performed by: EMERGENCY MEDICINE

## 2020-09-02 PROCEDURE — 99284 EMERGENCY DEPT VISIT MOD MDM: CPT

## 2020-09-02 PROCEDURE — 303977 HCHG I & D

## 2020-09-02 RX ORDER — LIDOCAINE HYDROCHLORIDE 10 MG/ML
20 INJECTION, SOLUTION INFILTRATION; PERINEURAL ONCE
Status: COMPLETED | OUTPATIENT
Start: 2020-09-02 | End: 2020-09-02

## 2020-09-02 RX ORDER — KETOROLAC TROMETHAMINE 30 MG/ML
30 INJECTION, SOLUTION INTRAMUSCULAR; INTRAVENOUS ONCE
Status: COMPLETED | OUTPATIENT
Start: 2020-09-02 | End: 2020-09-02

## 2020-09-02 RX ORDER — CLINDAMYCIN PHOSPHATE 900 MG/50ML
900 INJECTION, SOLUTION INTRAVENOUS ONCE
Status: COMPLETED | OUTPATIENT
Start: 2020-09-02 | End: 2020-09-03

## 2020-09-02 RX ADMIN — CLINDAMYCIN IN 5 PERCENT DEXTROSE 900 MG: 18 INJECTION, SOLUTION INTRAVENOUS at 23:51

## 2020-09-02 RX ADMIN — LIDOCAINE HYDROCHLORIDE 20 ML: 10 INJECTION, SOLUTION INFILTRATION; PERINEURAL at 23:45

## 2020-09-02 RX ADMIN — KETOROLAC TROMETHAMINE 30 MG: 30 INJECTION, SOLUTION INTRAMUSCULAR at 23:51

## 2020-09-02 NOTE — LETTER
9/8/2020               Luther Forrester  [address] [place]  [place] [address]        Dear Luther (MR#[id number])    This letter is sent in regards to your, recent visit to the [facility] Emergency Department on 9/2/2020.  During the visit, tests were performed to assist the physician in a medical diagnosis.  A review of those tests requires that we notify you of the following:    Your wound culture was POSITIVE for a bacteria called Viridans Streptococcus. The antibiotic prescribed for you (clindamycin) should be active to treat this bacteria. IT IS IMPORTANT THAT YOU CONTINUE TAKING YOUR ANTIBIOTIC UNTIL IT IS FINISHED. If your wound does not improve or if you continue to worsen, please seek further evaluation.       Please feel free to contact me at the number below if you have any questions or concerns. Thank you for your cooperation in the matter.    Sincerely,  ED Culture Follow-Up Staff  Mahi Martinez, PharmD    [facility], Emergency Department  [address] [place] [address] [id number] [phone] (ED Culture Line)  [phone]

## 2020-09-03 ENCOUNTER — HOSPITAL ENCOUNTER (OUTPATIENT)
Dept: RADIOLOGY | Facility: MEDICAL CENTER | Age: 30
End: 2020-09-03
Attending: EMERGENCY MEDICINE
Payer: MEDICAID

## 2020-09-03 VITALS
HEIGHT: 70 IN | RESPIRATION RATE: 18 BRPM | WEIGHT: 145.5 LBS | BODY MASS INDEX: 20.83 KG/M2 | OXYGEN SATURATION: 100 % | TEMPERATURE: 98.4 F | DIASTOLIC BLOOD PRESSURE: 68 MMHG | HEART RATE: 97 BPM | SYSTOLIC BLOOD PRESSURE: 120 MMHG

## 2020-09-03 LAB
BASOPHILS # BLD AUTO: 0.1 % (ref 0–1.8)
BASOPHILS # BLD: 0.01 K/UL (ref 0–0.12)
EOSINOPHIL # BLD AUTO: 0.16 K/UL (ref 0–0.51)
EOSINOPHIL NFR BLD: 1.8 % (ref 0–6.9)
ERYTHROCYTE [DISTWIDTH] IN BLOOD BY AUTOMATED COUNT: 38.2 FL (ref 35.9–50)
GRAM STN SPEC: NORMAL
HCT VFR BLD AUTO: 37.3 % (ref 42–52)
HGB BLD-MCNC: 12.4 G/DL (ref 14–18)
IMM GRANULOCYTES # BLD AUTO: 0.05 K/UL (ref 0–0.11)
IMM GRANULOCYTES NFR BLD AUTO: 0.6 % (ref 0–0.9)
LYMPHOCYTES # BLD AUTO: 1.45 K/UL (ref 1–4.8)
LYMPHOCYTES NFR BLD: 16.3 % (ref 22–41)
MCH RBC QN AUTO: 28.4 PG (ref 27–33)
MCHC RBC AUTO-ENTMCNC: 33.2 G/DL (ref 33.7–35.3)
MCV RBC AUTO: 85.4 FL (ref 81.4–97.8)
MONOCYTES # BLD AUTO: 0.65 K/UL (ref 0–0.85)
MONOCYTES NFR BLD AUTO: 7.3 % (ref 0–13.4)
NEUTROPHILS # BLD AUTO: 6.59 K/UL (ref 1.82–7.42)
NEUTROPHILS NFR BLD: 73.9 % (ref 44–72)
NRBC # BLD AUTO: 0 K/UL
NRBC BLD-RTO: 0 /100 WBC
PLATELET # BLD AUTO: 173 K/UL (ref 164–446)
PMV BLD AUTO: 10.2 FL (ref 9–12.9)
RBC # BLD AUTO: 4.37 M/UL (ref 4.7–6.1)
SIGNIFICANT IND 70042: NORMAL
SITE SITE: NORMAL
SOURCE SOURCE: NORMAL
WBC # BLD AUTO: 8.9 K/UL (ref 4.8–10.8)

## 2020-09-03 PROCEDURE — 87205 SMEAR GRAM STAIN: CPT

## 2020-09-03 PROCEDURE — 87070 CULTURE OTHR SPECIMN AEROBIC: CPT

## 2020-09-03 PROCEDURE — 93971 EXTREMITY STUDY: CPT | Mod: RT

## 2020-09-03 PROCEDURE — 303977 HCHG I & D

## 2020-09-03 RX ORDER — IBUPROFEN 800 MG/1
800 TABLET ORAL EVERY 8 HOURS PRN
Qty: 30 TAB | Refills: 0 | Status: SHIPPED | OUTPATIENT
Start: 2020-09-03 | End: 2022-12-23

## 2020-09-03 RX ORDER — CLINDAMYCIN HYDROCHLORIDE 300 MG/1
300 CAPSULE ORAL 3 TIMES DAILY
Qty: 40 CAP | Refills: 0 | Status: ON HOLD | OUTPATIENT
Start: 2020-09-03 | End: 2020-09-16

## 2020-09-03 NOTE — DISCHARGE INSTRUCTIONS
Apply warm moist compresses to your arm  Remove the packing in 2 days and flushed thoroughly with 50/50 hydrogen peroxide and water solution.  The wound will need to be repacked in 2 days.  Take the antibiotics until completely gone  Tylenol or ibuprofen for pain  Stop using drugs and get some help for your substance abuse!!!

## 2020-09-03 NOTE — ED PROVIDER NOTES
CHIEF COMPLAINT  Chief Complaint   Patient presents with   • Arm Pain   • Arm Swelling       hospitals  Luther Pop Forrester is a 29 y.o. male who presents tonight with his girlfriend with a chief complaint of right arm pain and swelling since last Friday.  Patient states he injected some heroin and likely some methamphetamines intramuscularly into his right deltoid region.  Since then he has had increased redness, swelling, fever and extensive tenderness to the right arm.  He denies any fever, shaking chills, nausea, vomiting.  He states he has had 8 abscesses in the past and has had them drained and he is familiar with the pain.  He has no known drug allergies.    REVIEW OF SYSTEMS  See HPI for further details. All other system reviews are negative.    PAST MEDICAL HISTORY  Heroin and methamphetamine abuse    FAMILY HISTORY  No family history on file.    SOCIAL HISTORY  Social History     Socioeconomic History   • Marital status: Single     Spouse name: Not on file   • Number of children: Not on file   • Years of education: Not on file   • Highest education level: Not on file   Occupational History   • Not on file   Social Needs   • Financial resource strain: Not on file   • Food insecurity     Worry: Not on file     Inability: Not on file   • Transportation needs     Medical: Not on file     Non-medical: Not on file   Tobacco Use   • Smoking status: Current Every Day Smoker     Packs/day: 0.50   • Smokeless tobacco: Former User     Types: Chew     Quit date: 5/9/2018   • Tobacco comment: occassionally on chew 1-2 times a day   Substance and Sexual Activity   • Alcohol use: No   • Drug use: Yes     Types: IV, Methamphetamines     Comment: heroin, and meth, last use fridat   • Sexual activity: Not on file   Lifestyle   • Physical activity     Days per week: Not on file     Minutes per session: Not on file   • Stress: Not on file   Relationships   • Social connections     Talks on phone: Not on file     Gets together:  "Not on file     Attends Mandaeism service: Not on file     Active member of club or organization: Not on file     Attends meetings of clubs or organizations: Not on file     Relationship status: Not on file   • Intimate partner violence     Fear of current or ex partner: Not on file     Emotionally abused: Not on file     Physically abused: Not on file     Forced sexual activity: Not on file   Other Topics Concern   • Not on file   Social History Narrative   • Not on file       SURGICAL HISTORY  Past Surgical History:   Procedure Laterality Date   • DENTAL EXTRACTION(S)      wisdom teeth   • TONSILLECTOMY         CURRENT MEDICATIONS  None    ALLERGIES  No Known Allergies    PHYSICAL EXAM  VITAL SIGNS: /68   Pulse 97   Temp 36.9 °C (98.4 °F) (Temporal)   Resp 18   Ht 1.778 m (5' 10\")   Wt 66 kg (145 lb 8.1 oz)   SpO2 100%   BMI 20.88 kg/m²     Constitutional: Patient is well developed, well nourished in moderate distress.  HENT: Normocephalic, atraumatic, Oropharynx moist without erythema or exudates, nose normal with no drainage.   Eyes: PERRL, EOMI  Neck: Supple with  Normal range of motion in flexion, extension and lateral rotation. No tenderness along the bony prominences or paraspinal muscles.  Lymphatic: No lymphadenopathy noted.   Cardiovascular: Normal heart rate and rhythm. No murmur  Thorax & Lungs: Clear and equal breath sounds with good excursion. No respiratory distress  Abdomen: Bowel sounds normal in all four quadrants. Soft,nontender.   Skin: Warm, Dry  Extremities: Peripheral pulses 4/4 right upper extremity reveals a moderate amount of soft tissue swelling from the right deltoid region with increased erythema and warmth and a 6 x 6 cm abscess.  It is raised and taut.  He has a moderate amount of edema distal to this area in the forearm with increased erythema on the medial aspect.  Neurovascular is intact with good capillary refill, good sensation limited range of motion secondary to " pain.  Remaining extremities are unremarkable.  Neurologic: Alert & oriented x 3, Normal motor function, Normal sensory function, DTR's 4/4 bilaterally.  Psychiatric: Affect normal, Mood normal.     Results for orders placed or performed during the hospital encounter of 09/02/20   CBC WITH DIFFERENTIAL   Result Value Ref Range    WBC 8.9 4.8 - 10.8 K/uL    RBC 4.37 (L) 4.70 - 6.10 M/uL    Hemoglobin 12.4 (L) 14.0 - 18.0 g/dL    Hematocrit 37.3 (L) 42.0 - 52.0 %    MCV 85.4 81.4 - 97.8 fL    MCH 28.4 27.0 - 33.0 pg    MCHC 33.2 (L) 33.7 - 35.3 g/dL    RDW 38.2 35.9 - 50.0 fL    Platelet Count 173 164 - 446 K/uL    MPV 10.2 9.0 - 12.9 fL    Neutrophils-Polys 73.90 (H) 44.00 - 72.00 %    Lymphocytes 16.30 (L) 22.00 - 41.00 %    Monocytes 7.30 0.00 - 13.40 %    Eosinophils 1.80 0.00 - 6.90 %    Basophils 0.10 0.00 - 1.80 %    Immature Granulocytes 0.60 0.00 - 0.90 %    Nucleated RBC 0.00 /100 WBC    Neutrophils (Absolute) 6.59 1.82 - 7.42 K/uL    Lymphs (Absolute) 1.45 1.00 - 4.80 K/uL    Monos (Absolute) 0.65 0.00 - 0.85 K/uL    Eos (Absolute) 0.16 0.00 - 0.51 K/uL    Baso (Absolute) 0.01 0.00 - 0.12 K/uL    Immature Granulocytes (abs) 0.05 0.00 - 0.11 K/uL    NRBC (Absolute) 0.00 K/uL         RADIOLOGY/PROCEDURES  US-EXTREMITY VENOUS UPPER UNILAT RIGHT         No DVT      COURSE & MEDICAL DECISION MAKING  Pertinent Labs & Imaging studies reviewed. (See chart for details)  Patient received an IV of normal saline along with clindamycin 600 mg IV piggyback.  Laboratories were drawn showing a normal white blood cell count with a stable H&H and no left shift.  He received Toradol for his pain and was feeling remarkably better upon recheck.  Ultrasound of the right upper extremity reveals no DVT  .  Procedure note: Patient's arm was prepped in sterile fashion with Betadine solution.  Lidocaine 1% was used for local anesthesia on the lateral aspect of his deltoid region.  11 blade scalpel was used for incision and the  abscess was probed with hemostats with loculations being broken up.  There is copious amounts of thick green discharge.  The abscess was milked until it appeared that the drainage had  slowed down.  The wound was flushed with 20 cc of normal saline.  It was packed with quarter inch iodoform gauze and sterile dressing was applied.  Patient tolerated procedure well.    He will be sent home with a prescription for clindamycin and ibuprofen for the next 10 days.  He is to ice for 24 hours then apply moist heat, have the packing removed in 2 days and repacked.  He is to continue his current medications and antibiotics, he was encouraged to stop using drugs as this could be his potential demise.  He states that he was planning to go to a detox unit later today anyway.  He was trying to get himself medically cleared.  He was discharged in stable and improved condition.    FINAL IMPRESSION  1.  Right arm cellulitis  2.  Right upper arm abscess  3.  Heroin abuse  4.  Encounter for incision and drainage of abscess         Electronically signed by: Ivett Gatica D.O., 9/3/2020 4:21 [facility] Provider Note

## 2020-09-03 NOTE — ED TRIAGE NOTES
Pt comes in c/o possible infection to R arm s/p IV drug use   Per pt and SO  They are wanted to go to Rehab this week however they will not want ot take pt due to this current condition of abscess   Arm is painful and swollen

## 2020-09-05 LAB
BACTERIA WND AEROBE CULT: ABNORMAL
BACTERIA WND AEROBE CULT: ABNORMAL
GRAM STN SPEC: ABNORMAL
SIGNIFICANT IND 70042: ABNORMAL
SITE SITE: ABNORMAL
SOURCE SOURCE: ABNORMAL

## 2020-09-09 NOTE — ED NOTES
"ED Positive Culture Follow-up/Notification Note:    Date: 9/8/2020     Patient seen in the ED on 9/2/2020 for swelling of the right deltoid with increased erythema and warmth 6x6cm. S/p I&D with copious green discharge and wound packed. History of heroin abuse.  1. Right arm cellulitis    2. Abscess of right arm    3. Encounter for incision and drainage procedure    4. Heroin abuse (HCC)       Discharge Medication List as of 9/3/2020  1:12 AM      START taking these medications    Details   clindamycin (CLEOCIN) 300 MG Cap Take 1 Cap by mouth 3 times a day.Take with foodDisp-40 Cap,R-0, Print Rx Paper      ibuprofen (MOTRIN) 800 MG Tab Take 1 Tab by mouth every 8 hours as needed for Moderate Pain or Inflammation.Take with foodDisp-30 Tab,R-0, Print Rx Paper             Allergies: Patient has no known allergies.     Vitals:    09/02/20 2230 09/02/20 2232 09/03/20 0106   BP:  116/66 120/68   Pulse:  (!) 111 97   Resp:  18    Temp:  36.9 °C (98.4 °F)    TempSrc:  Temporal    SpO2:  95% 100%   Weight: 66 kg (145 lb 8.1 oz)     Height: 1.778 m (5' 10\")         Final cultures:   Results     Procedure Component Value Units Date/Time    CULTURE WOUND W/ GRAM STAIN [[id number]]  (Abnormal) Collected: 09/03/20 0045    Order Status: Completed Specimen: Wound from Abscess Updated: 09/05/20 0925     Significant Indicator POS     Source WND     Site Right Arm     Culture Result -     Gram Stain Result Rare WBCs.  Moderate Gram positive cocci.       Culture Result Viridans Streptococcus  Moderate growth      Narrative:      Right arm  Right arm    GRAM STAIN [[id number]] Collected: 09/03/20 0045    Order Status: Completed Specimen: Wound Updated: 09/03/20 1255     Significant Indicator .     Source WND     Site Right Arm     Gram Stain Result Rare WBCs.  Moderate Gram positive cocci.      Narrative:      Right arm  Right arm          Plan:   Appropriate antibiotic therapy prescribed. No changes required based upon culture " result.  Patient has been requested to return for wound unpacking but has not been back.   Will send him a letter to continue antibiotics and return to the ED if wound is worsening.    Mahi Martinez, PharmD

## 2020-09-10 ENCOUNTER — APPOINTMENT (OUTPATIENT)
Dept: RADIOLOGY | Facility: MEDICAL CENTER | Age: 30
End: 2020-09-10
Attending: EMERGENCY MEDICINE
Payer: MEDICAID

## 2020-09-10 ENCOUNTER — HOSPITAL ENCOUNTER (EMERGENCY)
Facility: MEDICAL CENTER | Age: 30
End: 2020-09-10
Attending: EMERGENCY MEDICINE
Payer: MEDICAID

## 2020-09-10 VITALS
BODY MASS INDEX: 20.96 KG/M2 | DIASTOLIC BLOOD PRESSURE: 69 MMHG | WEIGHT: 146.39 LBS | SYSTOLIC BLOOD PRESSURE: 114 MMHG | OXYGEN SATURATION: 99 % | HEIGHT: 70 IN | HEART RATE: 98 BPM | RESPIRATION RATE: 18 BRPM | TEMPERATURE: 97.6 F

## 2020-09-10 DIAGNOSIS — F19.10 IV DRUG ABUSE (HCC): ICD-10-CM

## 2020-09-10 DIAGNOSIS — L03.115 CELLULITIS OF RIGHT LOWER EXTREMITY: ICD-10-CM

## 2020-09-10 LAB
ALBUMIN SERPL BCP-MCNC: 4.1 G/DL (ref 3.2–4.9)
ALBUMIN/GLOB SERPL: 1.4 G/DL
ALP SERPL-CCNC: 73 U/L (ref 30–99)
ALT SERPL-CCNC: 40 U/L (ref 2–50)
ANION GAP SERPL CALC-SCNC: 10 MMOL/L (ref 7–16)
APTT PPP: 27.8 SEC (ref 24.7–36)
AST SERPL-CCNC: 23 U/L (ref 12–45)
BASOPHILS # BLD AUTO: 0.3 % (ref 0–1.8)
BASOPHILS # BLD: 0.03 K/UL (ref 0–0.12)
BILIRUB SERPL-MCNC: 0.2 MG/DL (ref 0.1–1.5)
BUN SERPL-MCNC: 18 MG/DL (ref 8–22)
CALCIUM SERPL-MCNC: 8.9 MG/DL (ref 8.4–10.2)
CHLORIDE SERPL-SCNC: 101 MMOL/L (ref 96–112)
CO2 SERPL-SCNC: 28 MMOL/L (ref 20–33)
CREAT SERPL-MCNC: 0.95 MG/DL (ref 0.5–1.4)
EOSINOPHIL # BLD AUTO: 0.19 K/UL (ref 0–0.51)
EOSINOPHIL NFR BLD: 2.1 % (ref 0–6.9)
ERYTHROCYTE [DISTWIDTH] IN BLOOD BY AUTOMATED COUNT: 36.5 FL (ref 35.9–50)
GLOBULIN SER CALC-MCNC: 3 G/DL (ref 1.9–3.5)
GLUCOSE SERPL-MCNC: 89 MG/DL (ref 65–99)
HCT VFR BLD AUTO: 36.1 % (ref 42–52)
HGB BLD-MCNC: 11.9 G/DL (ref 14–18)
IMM GRANULOCYTES # BLD AUTO: 0.03 K/UL (ref 0–0.11)
IMM GRANULOCYTES NFR BLD AUTO: 0.3 % (ref 0–0.9)
INR PPP: 0.91 (ref 0.87–1.13)
LACTATE BLD-SCNC: 0.4 MMOL/L (ref 0.5–2)
LYMPHOCYTES # BLD AUTO: 1.52 K/UL (ref 1–4.8)
LYMPHOCYTES NFR BLD: 16.4 % (ref 22–41)
MCH RBC QN AUTO: 27.8 PG (ref 27–33)
MCHC RBC AUTO-ENTMCNC: 33 G/DL (ref 33.7–35.3)
MCV RBC AUTO: 84.3 FL (ref 81.4–97.8)
MONOCYTES # BLD AUTO: 0.86 K/UL (ref 0–0.85)
MONOCYTES NFR BLD AUTO: 9.3 % (ref 0–13.4)
NEUTROPHILS # BLD AUTO: 6.62 K/UL (ref 1.82–7.42)
NEUTROPHILS NFR BLD: 71.6 % (ref 44–72)
NRBC # BLD AUTO: 0 K/UL
NRBC BLD-RTO: 0 /100 WBC
PLATELET # BLD AUTO: 172 K/UL (ref 164–446)
PMV BLD AUTO: 9.4 FL (ref 9–12.9)
POTASSIUM SERPL-SCNC: 4.6 MMOL/L (ref 3.6–5.5)
PROT SERPL-MCNC: 7.1 G/DL (ref 6–8.2)
PROTHROMBIN TIME: 12 SEC (ref 12–14.6)
RBC # BLD AUTO: 4.28 M/UL (ref 4.7–6.1)
SODIUM SERPL-SCNC: 139 MMOL/L (ref 135–145)
WBC # BLD AUTO: 9.3 K/UL (ref 4.8–10.8)

## 2020-09-10 PROCEDURE — 93971 EXTREMITY STUDY: CPT | Mod: 26,RT | Performed by: INTERNAL MEDICINE

## 2020-09-10 PROCEDURE — 85730 THROMBOPLASTIN TIME PARTIAL: CPT

## 2020-09-10 PROCEDURE — 71045 X-RAY EXAM CHEST 1 VIEW: CPT

## 2020-09-10 PROCEDURE — 700102 HCHG RX REV CODE 250 W/ 637 OVERRIDE(OP): Performed by: EMERGENCY MEDICINE

## 2020-09-10 PROCEDURE — 85025 COMPLETE CBC W/AUTO DIFF WBC: CPT

## 2020-09-10 PROCEDURE — 93971 EXTREMITY STUDY: CPT | Mod: RT

## 2020-09-10 PROCEDURE — 83605 ASSAY OF LACTIC ACID: CPT

## 2020-09-10 PROCEDURE — 80053 COMPREHEN METABOLIC PANEL: CPT

## 2020-09-10 PROCEDURE — A9270 NON-COVERED ITEM OR SERVICE: HCPCS | Performed by: EMERGENCY MEDICINE

## 2020-09-10 PROCEDURE — 85610 PROTHROMBIN TIME: CPT

## 2020-09-10 PROCEDURE — 87040 BLOOD CULTURE FOR BACTERIA: CPT | Mod: 91

## 2020-09-10 PROCEDURE — 99284 EMERGENCY DEPT VISIT MOD MDM: CPT

## 2020-09-10 PROCEDURE — 36415 COLL VENOUS BLD VENIPUNCTURE: CPT

## 2020-09-10 PROCEDURE — 87040 BLOOD CULTURE FOR BACTERIA: CPT

## 2020-09-10 RX ORDER — ACETAMINOPHEN 325 MG/1
975 TABLET ORAL ONCE
Status: COMPLETED | OUTPATIENT
Start: 2020-09-10 | End: 2020-09-10

## 2020-09-10 RX ORDER — IBUPROFEN 600 MG/1
600 TABLET ORAL ONCE
Status: COMPLETED | OUTPATIENT
Start: 2020-09-10 | End: 2020-09-10

## 2020-09-10 RX ADMIN — IBUPROFEN 600 MG: 600 TABLET, FILM COATED ORAL at 14:29

## 2020-09-10 RX ADMIN — ACETAMINOPHEN 975 MG: 325 TABLET, FILM COATED ORAL at 14:29

## 2020-09-10 NOTE — ED TRIAGE NOTES
"Chief Complaint   Patient presents with   • Leg Pain     right calf muscle pain, present for the past two days, hard to bear weight.     /69   Pulse 98   Temp 36.4 °C (97.6 °F) (Temporal)   Resp 18   Ht 1.778 m (5' 10\")   Wt 66.4 kg (146 lb 6.2 oz)   SpO2 99%   BMI 21.00 kg/m²     Pt BIB friend for above concern.     COVID screen negative  "

## 2020-09-10 NOTE — ED NOTES
Iv started. Labs drawn and sent. Both sets of blood cx's drawn and sent. Pt given water and food, ok per ERP. Call light within reach

## 2020-09-10 NOTE — ED PROVIDER NOTES
"ED Provider Note    CHIEF COMPLAINT  Chief Complaint   Patient presents with   • Leg Pain     right calf muscle pain, present for the past two days, hard to bear weight.       HPI  Luther Forrester is a 29 y.o. male who presents with a history of IV drug abuse, he had an abscess drained in his right shoulder last week.  The cultures grew strep viridans.  The patient presents today with severe right lower leg pain.  It is difficult for him to walk.  He denies any vomiting or other systemic symptoms.  He has no family history of DVT or PE.    REVIEW OF SYSTEMS  See HPI for further details. All other systems are negative.     PAST MEDICAL HISTORY   IV heroin and methamphetamine abuse    SOCIAL HISTORY  Social History     Tobacco Use   • Smoking status: Current Every Day Smoker     Packs/day: 0.50   • Smokeless tobacco: Former User     Types: Chew     Quit date: 5/9/2018   • Tobacco comment: occassionally on chew 1-2 times a day   Substance and Sexual Activity   • Alcohol use: No   • Drug use: Yes     Types: IV, Methamphetamines, Intravenous     Comment: heroin/meth   • Sexual activity: Not on file       SURGICAL HISTORY   has a past surgical history that includes dental extraction(s) and tonsillectomy.    CURRENT MEDICATIONS  Clindamycin      ALLERGIES  No Known Allergies    PHYSICAL EXAM  VITAL SIGNS: /69   Pulse 98   Temp 36.4 °C (97.6 °F) (Temporal)   Resp 18   Ht 1.778 m (5' 10\")   Wt 66.4 kg (146 lb 6.2 oz)   SpO2 99%   BMI 21.00 kg/m²  @SANDRITA[[id number]::@   Pulse ox interpretation: I interpret this pulse ox as normal.  Constitutional: Alert.  HENT: No signs of trauma, Bilateral external ears normal, Nose normal.   Eyes: Pupils are equal and reactive, Conjunctiva normal, Non-icteric.   Neck: Normal range of motion, No tenderness, Supple, No stridor.   Lymphatic: No lymphadenopathy noted.   Cardiovascular: Regular rate and rhythm, no murmurs.   Thorax & Lungs: Normal breath sounds, No respiratory " distress, No wheezing, No chest tenderness.   Abdomen: Bowel sounds normal, Soft, No tenderness, No masses, No pulsatile masses. No peritoneal signs.  Skin: Warm, Dry, No erythema, No rash.   Back: No bony tenderness, No CVA tenderness.   Extremities: Intact distal pulses, No edema, No tenderness, No cyanosis.  Musculoskeletal: Good range of motion in all major joints. No tenderness to palpation or major deformities noted.   Neurologic: Alert , Normal motor function, Normal sensory function, No focal deficits noted.   Psychiatric: Affect normal, Judgment normal, Mood normal.       DIAGNOSTIC STUDIES / PROCEDURES      LABS  Labs Reviewed   CBC WITH DIFFERENTIAL - Abnormal; Notable for the following components:       Result Value    RBC 4.28 (*)     Hemoglobin 11.9 (*)     Hematocrit 36.1 (*)     MCHC 33.0 (*)     Lymphocytes 16.40 (*)     Monos (Absolute) 0.86 (*)     All other components within normal limits    Narrative:     Indicate which anticoagulants the patient is on:->NONE   LACTIC ACID - Abnormal; Notable for the following components:    Lactic Acid 0.4 (*)     All other components within normal limits    Narrative:     Indicate which anticoagulants the patient is on:->NONE   COMP METABOLIC PANEL    Narrative:     Indicate which anticoagulants the patient is on:->NONE   APTT    Narrative:     Indicate which anticoagulants the patient is on:->NONE   PROTHROMBIN TIME    Narrative:     Indicate which anticoagulants the patient is on:->NONE   ESTIMATED GFR    Narrative:     Indicate which anticoagulants the patient is on:->NONE         RADIOLOGY  US-EXTREMITY VENOUS LOWER UNILAT RIGHT   Final Result      DX-CHEST-PORTABLE (1 VIEW)   Final Result      No evidence of acute cardiopulmonary process.              COURSE & MEDICAL DECISION MAKING  Pertinent Labs & Imaging studies reviewed. (See chart for details)    I reviewed the patient's previous culture of the right shoulder wound.  On September 3, 2020 the  patient grew strep viridans from his right shoulder.    Differential diagnosis: Right lower extremity cellulitis, right lower extremity DVT    The patient's ultrasound is negative for DVT.      I was informed by the nurse that the patient was walking out of the ER with his IV.  We were able to stop him at triage, I told the patient if he leaves he could die or lose his leg from infection.  He wanted us to remove the IV and left.        FINAL IMPRESSION  1. Cellulitis of right lower extremity     2. IV drug abuse (HCC)                Electronically signed by: Todd Suero M.D., 9/10/2020 2:06 PM

## 2020-09-10 NOTE — ED NOTES
Assessment complete. Tech at BS to place IV and draw labs. Will medicate pt per ERP order. Call light within reach

## 2020-09-11 ENCOUNTER — APPOINTMENT (OUTPATIENT)
Dept: RADIOLOGY | Facility: MEDICAL CENTER | Age: 30
DRG: 603 | End: 2020-09-11
Attending: EMERGENCY MEDICINE
Payer: MEDICAID

## 2020-09-11 ENCOUNTER — HOSPITAL ENCOUNTER (INPATIENT)
Facility: MEDICAL CENTER | Age: 30
LOS: 1 days | DRG: 603 | End: 2020-09-12
Attending: EMERGENCY MEDICINE | Admitting: INTERNAL MEDICINE
Payer: MEDICAID

## 2020-09-11 PROBLEM — L03.115 CELLULITIS OF RIGHT LEG: Status: ACTIVE | Noted: 2020-09-11

## 2020-09-11 LAB
COVID ORDER STATUS COVID19: NORMAL
SARS-COV-2 RNA RESP QL NAA+PROBE: NOTDETECTED
SPECIMEN SOURCE: NORMAL

## 2020-09-11 PROCEDURE — 700111 HCHG RX REV CODE 636 W/ 250 OVERRIDE (IP)

## 2020-09-11 PROCEDURE — 96367 TX/PROPH/DG ADDL SEQ IV INF: CPT

## 2020-09-11 PROCEDURE — 700117 HCHG RX CONTRAST REV CODE 255: Performed by: EMERGENCY MEDICINE

## 2020-09-11 PROCEDURE — 99220 PR INITIAL OBSERVATION CARE,LEVL III: CPT | Performed by: HOSPITALIST

## 2020-09-11 PROCEDURE — G0378 HOSPITAL OBSERVATION PER HR: HCPCS

## 2020-09-11 PROCEDURE — 700105 HCHG RX REV CODE 258: Performed by: EMERGENCY MEDICINE

## 2020-09-11 PROCEDURE — 700105 HCHG RX REV CODE 258

## 2020-09-11 PROCEDURE — C9803 HOPD COVID-19 SPEC COLLECT: HCPCS | Performed by: HOSPITALIST

## 2020-09-11 PROCEDURE — 99285 EMERGENCY DEPT VISIT HI MDM: CPT

## 2020-09-11 PROCEDURE — 700102 HCHG RX REV CODE 250 W/ 637 OVERRIDE(OP)

## 2020-09-11 PROCEDURE — 700101 HCHG RX REV CODE 250

## 2020-09-11 PROCEDURE — 96365 THER/PROPH/DIAG IV INF INIT: CPT

## 2020-09-11 PROCEDURE — 73701 CT LOWER EXTREMITY W/DYE: CPT | Mod: RT

## 2020-09-11 PROCEDURE — 700111 HCHG RX REV CODE 636 W/ 250 OVERRIDE (IP): Performed by: EMERGENCY MEDICINE

## 2020-09-11 PROCEDURE — 700102 HCHG RX REV CODE 250 W/ 637 OVERRIDE(OP): Performed by: HOSPITALIST

## 2020-09-11 PROCEDURE — 36415 COLL VENOUS BLD VENIPUNCTURE: CPT

## 2020-09-11 PROCEDURE — 96375 TX/PRO/DX INJ NEW DRUG ADDON: CPT

## 2020-09-11 PROCEDURE — A9270 NON-COVERED ITEM OR SERVICE: HCPCS | Performed by: INTERNAL MEDICINE

## 2020-09-11 PROCEDURE — 96376 TX/PRO/DX INJ SAME DRUG ADON: CPT

## 2020-09-11 PROCEDURE — 700105 HCHG RX REV CODE 258: Performed by: INTERNAL MEDICINE

## 2020-09-11 PROCEDURE — 96366 THER/PROPH/DIAG IV INF ADDON: CPT

## 2020-09-11 PROCEDURE — A9270 NON-COVERED ITEM OR SERVICE: HCPCS

## 2020-09-11 PROCEDURE — 700111 HCHG RX REV CODE 636 W/ 250 OVERRIDE (IP): Performed by: INTERNAL MEDICINE

## 2020-09-11 PROCEDURE — A9270 NON-COVERED ITEM OR SERVICE: HCPCS | Performed by: HOSPITALIST

## 2020-09-11 PROCEDURE — 700102 HCHG RX REV CODE 250 W/ 637 OVERRIDE(OP): Performed by: INTERNAL MEDICINE

## 2020-09-11 PROCEDURE — U0003 INFECTIOUS AGENT DETECTION BY NUCLEIC ACID (DNA OR RNA); SEVERE ACUTE RESPIRATORY SYNDROME CORONAVIRUS 2 (SARS-COV-2) (CORONAVIRUS DISEASE [COVID-19]), AMPLIFIED PROBE TECHNIQUE, MAKING USE OF HIGH THROUGHPUT TECHNOLOGIES AS DESCRIBED BY CMS-2020-01-R: HCPCS

## 2020-09-11 RX ORDER — ACETAMINOPHEN 325 MG/1
TABLET ORAL
Status: COMPLETED
Start: 2020-09-11 | End: 2020-09-11

## 2020-09-11 RX ORDER — HYDROMORPHONE HYDROCHLORIDE 1 MG/ML
1 INJECTION, SOLUTION INTRAMUSCULAR; INTRAVENOUS; SUBCUTANEOUS ONCE
Status: DISCONTINUED | OUTPATIENT
Start: 2020-09-11 | End: 2020-09-12

## 2020-09-11 RX ORDER — PROMETHAZINE HYDROCHLORIDE 25 MG/1
12.5-25 SUPPOSITORY RECTAL EVERY 4 HOURS PRN
Status: DISCONTINUED | OUTPATIENT
Start: 2020-09-11 | End: 2020-09-12

## 2020-09-11 RX ORDER — BISACODYL 10 MG
10 SUPPOSITORY, RECTAL RECTAL
Status: DISCONTINUED | OUTPATIENT
Start: 2020-09-11 | End: 2020-09-12

## 2020-09-11 RX ORDER — ONDANSETRON 2 MG/ML
4 INJECTION INTRAMUSCULAR; INTRAVENOUS EVERY 4 HOURS PRN
Status: DISCONTINUED | OUTPATIENT
Start: 2020-09-11 | End: 2020-09-12

## 2020-09-11 RX ORDER — BUTALBITAL, ACETAMINOPHEN AND CAFFEINE 50; 325; 40 MG/1; MG/1; MG/1
TABLET ORAL
Status: DISPENSED
Start: 2020-09-11 | End: 2020-09-11

## 2020-09-11 RX ORDER — POLYETHYLENE GLYCOL 3350 17 G/17G
1 POWDER, FOR SOLUTION ORAL
Status: DISCONTINUED | OUTPATIENT
Start: 2020-09-11 | End: 2020-09-12

## 2020-09-11 RX ORDER — ONDANSETRON 4 MG/1
4 TABLET, ORALLY DISINTEGRATING ORAL EVERY 4 HOURS PRN
Status: DISCONTINUED | OUTPATIENT
Start: 2020-09-11 | End: 2020-09-12

## 2020-09-11 RX ORDER — PROMETHAZINE HYDROCHLORIDE 25 MG/1
12.5-25 TABLET ORAL EVERY 4 HOURS PRN
Status: DISCONTINUED | OUTPATIENT
Start: 2020-09-11 | End: 2020-09-12

## 2020-09-11 RX ORDER — OXYCODONE HYDROCHLORIDE 5 MG/1
5 TABLET ORAL EVERY 4 HOURS PRN
Status: DISCONTINUED | OUTPATIENT
Start: 2020-09-11 | End: 2020-09-12

## 2020-09-11 RX ORDER — MORPHINE SULFATE 4 MG/ML
2-5 INJECTION, SOLUTION INTRAMUSCULAR; INTRAVENOUS EVERY 4 HOURS PRN
Status: DISCONTINUED | OUTPATIENT
Start: 2020-09-11 | End: 2020-09-12

## 2020-09-11 RX ORDER — AMOXICILLIN 250 MG
2 CAPSULE ORAL 2 TIMES DAILY
Status: DISCONTINUED | OUTPATIENT
Start: 2020-09-11 | End: 2020-09-12

## 2020-09-11 RX ORDER — BUPRENORPHINE HYDROCHLORIDE AND NALOXONE HYDROCHLORIDE DIHYDRATE 8; 2 MG/1; MG/1
1 TABLET SUBLINGUAL DAILY
COMMUNITY
End: 2022-12-23

## 2020-09-11 RX ORDER — PROCHLORPERAZINE EDISYLATE 5 MG/ML
5-10 INJECTION INTRAMUSCULAR; INTRAVENOUS EVERY 4 HOURS PRN
Status: DISCONTINUED | OUTPATIENT
Start: 2020-09-11 | End: 2020-09-12

## 2020-09-11 RX ORDER — CITALOPRAM 20 MG/1
20 TABLET ORAL DAILY
Status: DISCONTINUED | OUTPATIENT
Start: 2020-09-11 | End: 2020-09-12

## 2020-09-11 RX ORDER — ACETAMINOPHEN 325 MG/1
650 TABLET ORAL EVERY 6 HOURS PRN
Status: DISCONTINUED | OUTPATIENT
Start: 2020-09-11 | End: 2020-09-12

## 2020-09-11 RX ADMIN — ACETAMINOPHEN 650 MG: 325 TABLET, FILM COATED ORAL at 05:31

## 2020-09-11 RX ADMIN — AMPICILLIN SODIUM AND SULBACTAM SODIUM 3 G: 2; 1 INJECTION, POWDER, FOR SOLUTION INTRAMUSCULAR; INTRAVENOUS at 04:52

## 2020-09-11 RX ADMIN — IOHEXOL 100 ML: 350 INJECTION, SOLUTION INTRAVENOUS at 11:01

## 2020-09-11 RX ADMIN — OXYCODONE HYDROCHLORIDE 5 MG: 5 TABLET ORAL at 22:28

## 2020-09-11 RX ADMIN — CITALOPRAM HYDROBROMIDE 20 MG: 20 TABLET ORAL at 11:29

## 2020-09-11 RX ADMIN — AMPICILLIN AND SULBACTAM 3 G: 2; 1 INJECTION, POWDER, FOR SOLUTION INTRAVENOUS at 12:09

## 2020-09-11 RX ADMIN — AMPICILLIN AND SULBACTAM 3 G: 2; 1 INJECTION, POWDER, FOR SOLUTION INTRAVENOUS at 17:17

## 2020-09-11 RX ADMIN — OXYCODONE HYDROCHLORIDE 5 MG: 5 TABLET ORAL at 13:56

## 2020-09-11 RX ADMIN — MORPHINE SULFATE 4 MG: 4 INJECTION INTRAVENOUS at 11:29

## 2020-09-11 RX ADMIN — ACETAMINOPHEN 650 MG: 325 TABLET, FILM COATED ORAL at 12:09

## 2020-09-11 RX ADMIN — MORPHINE SULFATE 4 MG: 4 INJECTION INTRAVENOUS at 20:03

## 2020-09-11 RX ADMIN — VANCOMYCIN HYDROCHLORIDE 1750 MG: 500 INJECTION, POWDER, LYOPHILIZED, FOR SOLUTION INTRAVENOUS at 05:25

## 2020-09-11 RX ADMIN — AMPICILLIN AND SULBACTAM 3 G: 2; 1 INJECTION, POWDER, FOR SOLUTION INTRAVENOUS at 23:22

## 2020-09-11 RX ADMIN — MORPHINE SULFATE 4 MG: 4 INJECTION INTRAVENOUS at 16:01

## 2020-09-11 RX ADMIN — VANCOMYCIN HYDROCHLORIDE 1000 MG: 500 INJECTION, POWDER, LYOPHILIZED, FOR SOLUTION INTRAVENOUS at 21:00

## 2020-09-11 RX ADMIN — OXYCODONE HYDROCHLORIDE 5 MG: 5 TABLET ORAL at 18:24

## 2020-09-11 RX ADMIN — VANCOMYCIN HYDROCHLORIDE 1000 MG: 500 INJECTION, POWDER, LYOPHILIZED, FOR SOLUTION INTRAVENOUS at 12:48

## 2020-09-11 RX ADMIN — ACETAMINOPHEN 650 MG: 325 TABLET, FILM COATED ORAL at 18:24

## 2020-09-11 ASSESSMENT — LIFESTYLE VARIABLES
EVER FELT BAD OR GUILTY ABOUT YOUR DRINKING: NO
TOTAL SCORE: 0
ALCOHOL_USE: NO
TOTAL SCORE: 0
EVER HAD A DRINK FIRST THING IN THE MORNING TO STEADY YOUR NERVES TO GET RID OF A HANGOVER: NO
HOW MANY TIMES IN THE PAST YEAR HAVE YOU HAD 5 OR MORE DRINKS IN A DAY: 0
ON A TYPICAL DAY WHEN YOU DRINK ALCOHOL HOW MANY DRINKS DO YOU HAVE: 0
HAVE YOU EVER FELT YOU SHOULD CUT DOWN ON YOUR DRINKING: NO
AVERAGE NUMBER OF DAYS PER WEEK YOU HAVE A DRINK CONTAINING ALCOHOL: 0
HAVE PEOPLE ANNOYED YOU BY CRITICIZING YOUR DRINKING: NO
TOTAL SCORE: 0
CONSUMPTION TOTAL: NEGATIVE

## 2020-09-11 ASSESSMENT — ENCOUNTER SYMPTOMS
HEADACHES: 0
BLURRED VISION: 0
VOMITING: 0
WEAKNESS: 0
FEVER: 0
NAUSEA: 0
MYALGIAS: 1
BRUISES/BLEEDS EASILY: 0
NECK PAIN: 0
INSOMNIA: 0
SHORTNESS OF BREATH: 0
COUGH: 0
DOUBLE VISION: 0
DEPRESSION: 0
PALPITATIONS: 0
DIZZINESS: 0
SORE THROAT: 0

## 2020-09-11 ASSESSMENT — COGNITIVE AND FUNCTIONAL STATUS - GENERAL
SUGGESTED CMS G CODE MODIFIER MOBILITY: CH
MOBILITY SCORE: 24
SUGGESTED CMS G CODE MODIFIER DAILY ACTIVITY: CH
DAILY ACTIVITIY SCORE: 24

## 2020-09-11 ASSESSMENT — PAIN DESCRIPTION - PAIN TYPE
TYPE: ACUTE PAIN

## 2020-09-11 ASSESSMENT — PATIENT HEALTH QUESTIONNAIRE - PHQ9
1. LITTLE INTEREST OR PLEASURE IN DOING THINGS: SEVERAL DAYS
9. THOUGHTS THAT YOU WOULD BE BETTER OFF DEAD, OR OF HURTING YOURSELF: SEVERAL DAYS
SUM OF ALL RESPONSES TO PHQ QUESTIONS 1-9: 9
3. TROUBLE FALLING OR STAYING ASLEEP OR SLEEPING TOO MUCH: NEARLY EVERY DAY
8. MOVING OR SPEAKING SO SLOWLY THAT OTHER PEOPLE COULD HAVE NOTICED. OR THE OPPOSITE, BEING SO FIGETY OR RESTLESS THAT YOU HAVE BEEN MOVING AROUND A LOT MORE THAN USUAL: NOT AT ALL
SUM OF ALL RESPONSES TO PHQ9 QUESTIONS 1 AND 2: 2
7. TROUBLE CONCENTRATING ON THINGS, SUCH AS READING THE NEWSPAPER OR WATCHING TELEVISION: NOT AT ALL
6. FEELING BAD ABOUT YOURSELF - OR THAT YOU ARE A FAILURE OR HAVE LET YOURSELF OR YOUR FAMILY DOWN: SEVERAL DAYS
5. POOR APPETITE OR OVEREATING: SEVERAL DAYS
4. FEELING TIRED OR HAVING LITTLE ENERGY: SEVERAL DAYS
2. FEELING DOWN, DEPRESSED, IRRITABLE, OR HOPELESS: SEVERAL DAYS

## 2020-09-11 ASSESSMENT — FIBROSIS 4 INDEX
FIB4 SCORE: 0.59
FIB4 SCORE: 0.59

## 2020-09-11 NOTE — ED NOTES
Given sandwhich, water and juice per ERP verbal okay. Pt remains A&Ox4  In no apparent distress at this time

## 2020-09-11 NOTE — PROGRESS NOTES
"Pharmacy Kinetics 29 y.o. male on vancomycin day # 1 2020    Currently on Vancomycin 1750 mg iv LD    Indication for Treatment: Cellulitis of right leg    Pertinent history per medical record: Admitted on 2020 for right lower extremitie erythema and pain. Recently treated for right shoulder abscess that grew strep viridans on 20. Left AMA and returned today. Ultrasound negative for DVT. History of heroin use    Other antibiotics: Unasyn 3 g once    Allergies: Patient has no known allergies.     List concerns for renal function: none    Pertinent cultures to date:    (prior admission) - strep viridans   Bcx scheduled for today    Recent Labs     09/10/20  1530   WBC 9.3   NEUTSPOLYS 71.60     Recent Labs     09/10/20  1530   BUN 18   CREATININE 0.95   ALBUMIN 4.1     No results for input(s): VANCOTROUGH, VANCOPEAK, VANCORANDOM in the last 72 hours.No intake or output data in the 24 hours ending 20 0641   /63   Pulse 92   Temp 36.8 °C (98.3 °F) (Temporal)   Resp 16   Ht 1.778 m (5' 10\")   Wt 65.9 kg (145 lb 4.5 oz)   SpO2 100%  Temp (24hrs), Av.8 °C (98.3 °F), Min:36.8 °C (98.3 °F), Max:36.8 °C (98.3 °F)      A/P   1. Vancomycin dose change: 1000 mg IV q8h  2. Next vancomycin level:  @ 0500  3. Goal trough: 10-15 mcg/mL  4. Comments: Will monitor and adjust per protocol     Cecilia Blount PharmD Candidate     Mik Case RPh    "

## 2020-09-11 NOTE — ED NOTES
"Report received.  Pt ambulated to BR with c/o '\"stiff knee\"  Pt requesting his dose of Soboxone 12 mg ([facility] for [facility]) awaiting RX tech to confirm dose.  Pt requesting breakfast tray, NPO status per order. Pt provided food in ER overnight.    Dr Hernandez messaged with information.  Awaiting response.    "

## 2020-09-11 NOTE — PROGRESS NOTES
2 RN Skin Check    2 RN skin check complete.   Devices in place: NA.  Skin assessed under devices: N\A.  Confirmed pressure ulcers found on: NA.  New potential pressure ulcers noted on NA. Wound consult placed N/A.  The following interventions in place NA.    Swelling and redness to right lower extremity.

## 2020-09-11 NOTE — PROGRESS NOTES
Please see the H&P Dictated After Midnight for full details    Interval Progress Note:  Patient seen and examined    29M w hx of IVDU and recent R shoulder abscess s/p drainage in ER, discharged on PO abx, admitted with R calf pain redness and swelling found to have cellulitis. Ultrasound negative for DVT    Plan:  Hold home suboxone in order to provide adequate pain control as needed  Continue vanc/unasyn  Dilaudid x1  Morphine / Oxy as needed  Follow blood cultures    Teagan Hernandez D.O.

## 2020-09-11 NOTE — PROGRESS NOTES
Spoke with pt regarding visitor policy. Pt verbalized understanding that only 1 designated visitor is allowed for the duration of the hospital stay. Per pt, mother Deserase will not be able to visit so pt would like friend Clif to visit.

## 2020-09-11 NOTE — ED NOTES
RN called [facility] for behavioral health and spoke with Sheree JAIN to confirm suboxone order. Per Sheree readmitted yesterday 9/10/2020.  Pt takes 12mg Suboxone 1 time daily.       Dr Hernandez updated.

## 2020-09-11 NOTE — ED NOTES
Pt had difficulty walking to BR, ice pack provided. Pt requesting to see MD and steroid for inflammation as he got on last visit.  Tylenol not available till 1130 and pt updated again.      Dr Hrenandez updated.

## 2020-09-11 NOTE — CARE PLAN
Problem: Knowledge Deficit  Goal: Knowledge of disease process/condition, treatment plan, diagnostic tests, and medications will improve  Outcome: PROGRESSING AS EXPECTED  Note: Updated pt on plan of care, no further questions at this time.      Problem: Pain Management  Goal: Pain level will decrease to patient's comfort goal  Outcome: PROGRESSING AS EXPECTED  Note: Pt medicated with morphine and tylenol per MAR. Will continue to assess.

## 2020-09-11 NOTE — H&P
[facility] Medicine History & Physical Note    Date of Service  9/11/2020    Primary Care Physician  Aman Brantley M.D.    Consultants  None    Code Status  Full Code    Chief Complaint  Right lower extremity pain and erythema    History of Presenting Illness  29 y.o. male, with history of heroin use, recently treated for right shoulder abscess that was growing strep viridans on 9/3/2020, who presented to the emergency department on 9/11/2020 with complaints of right lower extremities erythema and pain.  Patient was seen earlier in the ED, had a right lower extremity Doppler ultrasound done that was negative for DVT.  Patient decided to leave AMA AGAINST MEDICAL ADVICE but returns because pain is getting worse.  Pain is 10/10 in intensity, worse with ambulation and sharp in nature.  He has some mild relief with Tylenol and ibuprofen.    ER COURSE:  -Afebrile, tachycardic.  Normal blood pressure.  -Ultrasound Doppler of the right lower extremity is negative for DVT.  -CT of the leg is pending  -Patient received Unasyn and vancomycin in the ED.      Review of Systems  Review of Systems   Constitutional: Negative for fever.   HENT: Negative for congestion and sore throat.    Eyes: Negative for blurred vision and double vision.   Respiratory: Negative for cough and shortness of breath.    Cardiovascular: Negative for chest pain and palpitations.   Gastrointestinal: Negative for nausea and vomiting.   Genitourinary: Negative for dysuria and urgency.   Musculoskeletal: Positive for myalgias. Negative for neck pain.        Leg pain   Skin: Positive for itching. Negative for rash.   Neurological: Negative for dizziness, weakness and headaches.   Endo/Heme/Allergies: Does not bruise/bleed easily.   Psychiatric/Behavioral: Negative for depression. The patient does not have insomnia.        Past Medical History  Denies prior medical history.    Surgical History   has a past surgical history that includes dental extraction(s) and  tonsillectomy.     Family History  Reviewed and not pertinent    Social History   reports that he has been smoking. He has been smoking about 0.50 packs per day. He quit smokeless tobacco use about 2 years ago.  His smokeless tobacco use included chew. He reports current drug use. Drugs: IV, Methamphetamines, and Intravenous. He reports that he does not drink alcohol.    Allergies  No Known Allergies    Medications  Prior to Admission Medications   Prescriptions Last Dose Informant Patient Reported? Taking?   citalopram (CELEXA) 20 MG Tab   No No   Sig: Take 1 Tab by mouth every day.   clindamycin (CLEOCIN) 300 MG Cap   No No   Sig: Take 1 Cap by mouth 3 times a day.   ibuprofen (MOTRIN) 800 MG Tab   No No   Sig: Take 1 Tab by mouth every 8 hours as needed for Moderate Pain or Inflammation.      Facility-Administered Medications: None       Physical Exam  Temp:  [36.4 °C (97.6 °F)-36.8 °C (98.3 °F)] 36.8 °C (98.3 °F)  Pulse:  [] 100  Resp:  [16-18] 16  BP: (114-129)/(64-69) 129/64  SpO2:  [99 %-100 %] 100 %    Physical Exam  Constitutional:       Appearance: Normal appearance.   HENT:      Head: Normocephalic and atraumatic.      Nose: Nose normal.      Mouth/Throat:      Mouth: Mucous membranes are moist.      Pharynx: Oropharynx is clear.   Eyes:      Extraocular Movements: Extraocular movements intact.      Pupils: Pupils are equal, round, and reactive to light.   Neck:      Musculoskeletal: Normal range of motion and neck supple.   Cardiovascular:      Rate and Rhythm: Regular rhythm. Tachycardia present.      Pulses: Normal pulses.      Heart sounds: Normal heart sounds.   Pulmonary:      Effort: Pulmonary effort is normal.      Breath sounds: Normal breath sounds.   Abdominal:      General: Abdomen is flat. Bowel sounds are normal.      Palpations: Abdomen is soft.   Skin:     General: Skin is warm and dry.      Findings: Rash present.      Comments: Right lower extremity erythema, with mild pain on  palpation on posterior aspect.  No oozing.  Pain is worse with movement   Neurological:      General: No focal deficit present.      Mental Status: He is alert and oriented to person, place, and time.   Psychiatric:         Mood and Affect: Mood normal.         Behavior: Behavior normal.         Laboratory:  Recent Labs     09/10/20  1530   WBC 9.3   RBC 4.28*   HEMOGLOBIN 11.9*   HEMATOCRIT 36.1*   MCV 84.3   MCH 27.8   MCHC 33.0*   RDW 36.5   PLATELETCT 172   MPV 9.4     Recent Labs     09/10/20  1530   SODIUM 139   POTASSIUM 4.6   CHLORIDE 101   CO2 28   GLUCOSE 89   BUN 18   CREATININE 0.95   CALCIUM 8.9     Recent Labs     09/10/20  1530   ALTSGPT 40   ASTSGOT 23   ALKPHOSPHAT 73   TBILIRUBIN 0.2   GLUCOSE 89     Recent Labs     09/10/20  1530   APTT 27.8   INR 0.91     No results for input(s): NTPROBNP in the last 72 hours.      No results for input(s): TROPONINT in the last 72 hours.    Imaging:  CT-EXTREMITY, LOWER WITH RIGHT    (Results Pending)         Assessment/Plan:  I anticipate this patient is appropriate for observation status at this time.    * Cellulitis of right leg  Assessment & Plan  -Observation status on telemetry.  -He is not septic on admission however has cellulitis of the right left lower extremity.  -Doppler ultrasound negative for DVT.  -Patient had a positive wound cultures (right shoulder wound) with Strep viridans on 9/3/2020.  -He then was given Unasyn in the ED and I will continue with vancomycin.    History of substance abuse (HCC)- (present on admission)  Assessment & Plan  -History of IV heroin, x10 years.  High risk for leaving AMA    Anxiety- (present on admission)  Assessment & Plan  -Continue citalopram and      DVT Ppx: SCD's

## 2020-09-11 NOTE — ASSESSMENT & PLAN NOTE
CT shows extensive soft tissue edema, concern for fasciitis vs myositis  Minimal improvement over last 24h w IV abx (Unasyn/Vanc)  WBC increased today and lower ext is severely tender  -Doppler ultrasound negative for DVT.  -Patient had a positive wound cultures (right shoulder wound) with Strep viridans on 9/3/2020.  -Continue Unasyn/Vanc  -I will consult ortho to follow along to eval for nec fasc vs compartment syndrome  -Check lactate, CPK, ESR, CRP, Procalcitonin

## 2020-09-11 NOTE — ED PROVIDER NOTES
ED Provider Note    CHIEF COMPLAINT  No chief complaint on file.      HPI  Luther Forrester is a 29 y.o. male with history of IV drug use, who was seen on the 10th with right lower leg pain.  He has no family or personal history of DVT or PE.  Patient had an ultrasound which showed right lower extremity cellulitis and no evidence of a DVT.  Patient absconded from the emergency department prior to any antibiotics or treatment.  Patient reports that he was told he needed to be admitted for his infection for IV antibiotics but needed to handle some things at home first.  Patient reports that he is now amenable to admission.  He denies any worsening pain.  Patient reports occasional subjective fever and chills but has not taken his temperature.  Patient reports ongoing leg pain which is moderate to severe.  He denies any mary knee or ankle pain.    REVIEW OF SYSTEMS  ROS  See HPI for further details. All other systems are negative.     PAST MEDICAL HISTORY       SOCIAL HISTORY  Social History     Tobacco Use   • Smoking status: Current Every Day Smoker     Packs/day: 0.50   • Smokeless tobacco: Former User     Types: Chew     Quit date: 5/9/2018   • Tobacco comment: occassionally on chew 1-2 times a day   Substance and Sexual Activity   • Alcohol use: No   • Drug use: Yes     Types: IV, Methamphetamines, Intravenous     Comment: heroin/meth   • Sexual activity: Not on file       SURGICAL HISTORY   has a past surgical history that includes dental extraction(s) and tonsillectomy.    CURRENT MEDICATIONS  Home Medications    **Home medications have not yet been reviewed for this encounter**         ALLERGIES  No Known Allergies    PHYSICAL EXAM  Physical Exam   Constitutional: He is oriented to person, place, and time. He appears well-developed and well-nourished.   HENT:   Head: Normocephalic and atraumatic.   Eyes: Pupils are equal, round, and reactive to light. Conjunctivae are normal.   Neck: Normal range of  motion. Neck supple.   Cardiovascular: Normal rate and regular rhythm. Exam reveals no gallop and no friction rub.   No murmur heard.  Pulmonary/Chest: Effort normal and breath sounds normal. No respiratory distress. He has no wheezes.   Abdominal: Soft. Bowel sounds are normal. He exhibits no distension. There is no abdominal tenderness. There is no rebound.   Musculoskeletal:      Comments: Right lower extremity with associated overlying erythema, compartments are soft though there is considerable nonpitting edema.  Full range motion of knee and ankle without any considerable pain evoked.  Distal pulses are 2+.  Pain to palpation.  No pain out of proportion.  Sensation intact   Neurological: He is alert and oriented to person, place, and time.   Skin: Skin is warm and dry.   Psychiatric: He has a normal mood and affect. His behavior is normal.     Results for orders placed or performed during the hospital encounter of 09/10/20   CBC WITH DIFFERENTIAL   Result Value Ref Range    WBC 9.3 4.8 - 10.8 K/uL    RBC 4.28 (L) 4.70 - 6.10 M/uL    Hemoglobin 11.9 (L) 14.0 - 18.0 g/dL    Hematocrit 36.1 (L) 42.0 - 52.0 %    MCV 84.3 81.4 - 97.8 fL    MCH 27.8 27.0 - 33.0 pg    MCHC 33.0 (L) 33.7 - 35.3 g/dL    RDW 36.5 35.9 - 50.0 fL    Platelet Count 172 164 - 446 K/uL    MPV 9.4 9.0 - 12.9 fL    Neutrophils-Polys 71.60 44.00 - 72.00 %    Lymphocytes 16.40 (L) 22.00 - 41.00 %    Monocytes 9.30 0.00 - 13.40 %    Eosinophils 2.10 0.00 - 6.90 %    Basophils 0.30 0.00 - 1.80 %    Immature Granulocytes 0.30 0.00 - 0.90 %    Nucleated RBC 0.00 /100 WBC    Neutrophils (Absolute) 6.62 1.82 - 7.42 K/uL    Lymphs (Absolute) 1.52 1.00 - 4.80 K/uL    Monos (Absolute) 0.86 (H) 0.00 - 0.85 K/uL    Eos (Absolute) 0.19 0.00 - 0.51 K/uL    Baso (Absolute) 0.03 0.00 - 0.12 K/uL    Immature Granulocytes (abs) 0.03 0.00 - 0.11 K/uL    NRBC (Absolute) 0.00 K/uL   COMP METABOLIC PANEL   Result Value Ref Range    Sodium 139 135 - 145 mmol/L     Potassium 4.6 3.6 - 5.5 mmol/L    Chloride 101 96 - 112 mmol/L    Co2 28 20 - 33 mmol/L    Anion Gap 10.0 7.0 - 16.0    Glucose 89 65 - 99 mg/dL    Bun 18 8 - 22 mg/dL    Creatinine 0.95 0.50 - 1.40 mg/dL    Calcium 8.9 8.4 - 10.2 mg/dL    AST(SGOT) 23 12 - 45 U/L    ALT(SGPT) 40 2 - 50 U/L    Alkaline Phosphatase 73 30 - 99 U/L    Total Bilirubin 0.2 0.1 - 1.5 mg/dL    Albumin 4.1 3.2 - 4.9 g/dL    Total Protein 7.1 6.0 - 8.2 g/dL    Globulin 3.0 1.9 - 3.5 g/dL    A-G Ratio 1.4 g/dL   APTT   Result Value Ref Range    APTT 27.8 24.7 - 36.0 sec   PROTHROMBIN TIME   Result Value Ref Range    PT 12.0 12.0 - 14.6 sec    INR 0.91 0.87 - 1.13   LACTIC ACID   Result Value Ref Range    Lactic Acid 0.4 (L) 0.5 - 2.0 mmol/L   ESTIMATED GFR   Result Value Ref Range    GFR If African American >60 >60 mL/min/1.73 m 2    GFR If Non African American >60 >60 mL/min/1.73 m 2     CT-EXTREMITY, LOWER WITH RIGHT    (Results Pending)         COURSE & MEDICAL DECISION MAKING  Pertinent Labs & Imaging studies reviewed. (See chart for details)    Patient here with lower extremity cellulitis, patient will receive IV antibiotics for this and be admitted per the first emergency physicians plan.  We will give Unasyn.  Patient's most recent abscess culture was strep viridans.  Patient will have blood cultures, IV will be placed and patient will receive IV antibiotics.  At this point I do not believe that repeating the labs were checked earlier today as necessary.  I will check a CT of patient's lower extremity to ensure there is no intramuscular abscess given the considerable edema of the leg and associated pain.  Necrotizing fasciitis is highly unlikely especially given patient's reassuring labs earlier today, no hyponatremia, no severe leukocytosis, normal lactic.  There is been a delay in patient's CT given difficulty obtaining IV.  Patient has been accepted by hospitalist.  She would like me to add Vanco which I have done.      FINAL  IMPRESSION  1.  Right lower extremity cellulitis, IV drug use      Electronically signed by: Rio Marrero M.D., 9/11/2020 1:49 AM

## 2020-09-12 ENCOUNTER — HOSPITAL ENCOUNTER (OUTPATIENT)
Facility: MEDICAL CENTER | Age: 30
DRG: 580 | End: 2020-09-12
Payer: MEDICAID

## 2020-09-12 ENCOUNTER — ANESTHESIA (OUTPATIENT)
Dept: SURGERY | Facility: MEDICAL CENTER | Age: 30
DRG: 580 | End: 2020-09-12
Payer: MEDICAID

## 2020-09-12 ENCOUNTER — ANESTHESIA EVENT (OUTPATIENT)
Dept: SURGERY | Facility: MEDICAL CENTER | Age: 30
DRG: 580 | End: 2020-09-12
Payer: MEDICAID

## 2020-09-12 ENCOUNTER — HOSPITAL ENCOUNTER (INPATIENT)
Facility: MEDICAL CENTER | Age: 30
LOS: 4 days | DRG: 580 | End: 2020-09-16
Attending: INTERNAL MEDICINE | Admitting: INTERNAL MEDICINE
Payer: MEDICAID

## 2020-09-12 VITALS
HEART RATE: 92 BPM | OXYGEN SATURATION: 98 % | TEMPERATURE: 98.2 F | HEIGHT: 70 IN | DIASTOLIC BLOOD PRESSURE: 56 MMHG | BODY MASS INDEX: 20.99 KG/M2 | WEIGHT: 146.61 LBS | RESPIRATION RATE: 18 BRPM | SYSTOLIC BLOOD PRESSURE: 120 MMHG

## 2020-09-12 PROBLEM — L02.419 SHOULDER ABSCESS: Status: ACTIVE | Noted: 2020-09-12

## 2020-09-12 LAB
ANION GAP SERPL CALC-SCNC: 12 MMOL/L (ref 7–16)
BASOPHILS # BLD AUTO: 0.2 % (ref 0–1.8)
BASOPHILS # BLD: 0.03 K/UL (ref 0–0.12)
BUN SERPL-MCNC: 13 MG/DL (ref 8–22)
CALCIUM SERPL-MCNC: 8.8 MG/DL (ref 8.4–10.2)
CHLORIDE SERPL-SCNC: 97 MMOL/L (ref 96–112)
CK SERPL-CCNC: 74 U/L (ref 0–154)
CO2 SERPL-SCNC: 25 MMOL/L (ref 20–33)
CREAT SERPL-MCNC: 0.71 MG/DL (ref 0.5–1.4)
CRP SERPL HS-MCNC: 15.45 MG/DL (ref 0–0.75)
EOSINOPHIL # BLD AUTO: 0.08 K/UL (ref 0–0.51)
EOSINOPHIL NFR BLD: 0.5 % (ref 0–6.9)
ERYTHROCYTE [DISTWIDTH] IN BLOOD BY AUTOMATED COUNT: 37.4 FL (ref 35.9–50)
ERYTHROCYTE [SEDIMENTATION RATE] IN BLOOD BY WESTERGREN METHOD: 37 MM/HOUR (ref 0–15)
GLUCOSE SERPL-MCNC: 126 MG/DL (ref 65–99)
HCT VFR BLD AUTO: 37.7 % (ref 42–52)
HGB BLD-MCNC: 12.4 G/DL (ref 14–18)
IMM GRANULOCYTES # BLD AUTO: 0.08 K/UL (ref 0–0.11)
IMM GRANULOCYTES NFR BLD AUTO: 0.5 % (ref 0–0.9)
LACTATE BLD-SCNC: 2.1 MMOL/L (ref 0.5–2)
LYMPHOCYTES # BLD AUTO: 1.44 K/UL (ref 1–4.8)
LYMPHOCYTES NFR BLD: 8.8 % (ref 22–41)
MCH RBC QN AUTO: 27.4 PG (ref 27–33)
MCHC RBC AUTO-ENTMCNC: 32.9 G/DL (ref 33.7–35.3)
MCV RBC AUTO: 83.2 FL (ref 81.4–97.8)
MONOCYTES # BLD AUTO: 1 K/UL (ref 0–0.85)
MONOCYTES NFR BLD AUTO: 6.1 % (ref 0–13.4)
NEUTROPHILS # BLD AUTO: 13.7 K/UL (ref 1.82–7.42)
NEUTROPHILS NFR BLD: 83.9 % (ref 44–72)
NRBC # BLD AUTO: 0 K/UL
NRBC BLD-RTO: 0 /100 WBC
PLATELET # BLD AUTO: 188 K/UL (ref 164–446)
PMV BLD AUTO: 9.7 FL (ref 9–12.9)
POTASSIUM SERPL-SCNC: 3.8 MMOL/L (ref 3.6–5.5)
PROCALCITONIN SERPL-MCNC: 0.14 NG/ML
RBC # BLD AUTO: 4.53 M/UL (ref 4.7–6.1)
SODIUM SERPL-SCNC: 134 MMOL/L (ref 135–145)
VANCOMYCIN TROUGH SERPL-MCNC: 8.3 UG/ML (ref 10–20)
WBC # BLD AUTO: 16.3 K/UL (ref 4.8–10.8)

## 2020-09-12 PROCEDURE — 160048 HCHG OR STATISTICAL LEVEL 1-5: Performed by: ORTHOPAEDIC SURGERY

## 2020-09-12 PROCEDURE — 82550 ASSAY OF CK (CPK): CPT

## 2020-09-12 PROCEDURE — 80048 BASIC METABOLIC PNL TOTAL CA: CPT

## 2020-09-12 PROCEDURE — 87015 SPECIMEN INFECT AGNT CONCNTJ: CPT

## 2020-09-12 PROCEDURE — 160002 HCHG RECOVERY MINUTES (STAT): Performed by: ORTHOPAEDIC SURGERY

## 2020-09-12 PROCEDURE — 0K9S0ZZ DRAINAGE OF RIGHT LOWER LEG MUSCLE, OPEN APPROACH: ICD-10-PCS | Performed by: ORTHOPAEDIC SURGERY

## 2020-09-12 PROCEDURE — 501330 HCHG SET, CYSTO IRRIG TUBING: Performed by: ORTHOPAEDIC SURGERY

## 2020-09-12 PROCEDURE — 700101 HCHG RX REV CODE 250: Performed by: ANESTHESIOLOGY

## 2020-09-12 PROCEDURE — 700111 HCHG RX REV CODE 636 W/ 250 OVERRIDE (IP): Performed by: ANESTHESIOLOGY

## 2020-09-12 PROCEDURE — 83605 ASSAY OF LACTIC ACID: CPT

## 2020-09-12 PROCEDURE — 770006 HCHG ROOM/CARE - MED/SURG/GYN SEMI*

## 2020-09-12 PROCEDURE — 160027 HCHG SURGERY MINUTES - 1ST 30 MINS LEVEL 2: Performed by: ORTHOPAEDIC SURGERY

## 2020-09-12 PROCEDURE — 700105 HCHG RX REV CODE 258: Performed by: ANESTHESIOLOGY

## 2020-09-12 PROCEDURE — A6454 SELF-ADHER BAND W>=3" <5"/YD: HCPCS | Performed by: ORTHOPAEDIC SURGERY

## 2020-09-12 PROCEDURE — 700105 HCHG RX REV CODE 258: Performed by: INTERNAL MEDICINE

## 2020-09-12 PROCEDURE — 160038 HCHG SURGERY MINUTES - EA ADDL 1 MIN LEVEL 2: Performed by: ORTHOPAEDIC SURGERY

## 2020-09-12 PROCEDURE — 87075 CULTR BACTERIA EXCEPT BLOOD: CPT

## 2020-09-12 PROCEDURE — 500881 HCHG PACK, EXTREMITY: Performed by: ORTHOPAEDIC SURGERY

## 2020-09-12 PROCEDURE — 86140 C-REACTIVE PROTEIN: CPT

## 2020-09-12 PROCEDURE — 87205 SMEAR GRAM STAIN: CPT | Mod: 91

## 2020-09-12 PROCEDURE — 700102 HCHG RX REV CODE 250 W/ 637 OVERRIDE(OP): Performed by: ANESTHESIOLOGY

## 2020-09-12 PROCEDURE — 96376 TX/PRO/DX INJ SAME DRUG ADON: CPT

## 2020-09-12 PROCEDURE — A9270 NON-COVERED ITEM OR SERVICE: HCPCS | Performed by: INTERNAL MEDICINE

## 2020-09-12 PROCEDURE — 700102 HCHG RX REV CODE 250 W/ 637 OVERRIDE(OP): Performed by: INTERNAL MEDICINE

## 2020-09-12 PROCEDURE — A9270 NON-COVERED ITEM OR SERVICE: HCPCS | Performed by: HOSPITALIST

## 2020-09-12 PROCEDURE — 700102 HCHG RX REV CODE 250 W/ 637 OVERRIDE(OP): Performed by: HOSPITALIST

## 2020-09-12 PROCEDURE — 700111 HCHG RX REV CODE 636 W/ 250 OVERRIDE (IP): Performed by: INTERNAL MEDICINE

## 2020-09-12 PROCEDURE — 160035 HCHG PACU - 1ST 60 MINS PHASE I: Performed by: ORTHOPAEDIC SURGERY

## 2020-09-12 PROCEDURE — 85025 COMPLETE CBC W/AUTO DIFF WBC: CPT

## 2020-09-12 PROCEDURE — 99239 HOSP IP/OBS DSCHRG MGMT >30: CPT | Performed by: INTERNAL MEDICINE

## 2020-09-12 PROCEDURE — 80202 ASSAY OF VANCOMYCIN: CPT

## 2020-09-12 PROCEDURE — 160036 HCHG PACU - EA ADDL 30 MINS PHASE I: Performed by: ORTHOPAEDIC SURGERY

## 2020-09-12 PROCEDURE — 85652 RBC SED RATE AUTOMATED: CPT

## 2020-09-12 PROCEDURE — 500376 HCHG DRAIN, J-P ROUND 15FR: Performed by: ORTHOPAEDIC SURGERY

## 2020-09-12 PROCEDURE — 84145 PROCALCITONIN (PCT): CPT

## 2020-09-12 PROCEDURE — 501445 HCHG STAPLER, SKIN DISP: Performed by: ORTHOPAEDIC SURGERY

## 2020-09-12 PROCEDURE — 500389 HCHG DRAIN, RESERVOIR SUCT JP 100CC: Performed by: ORTHOPAEDIC SURGERY

## 2020-09-12 PROCEDURE — A9270 NON-COVERED ITEM OR SERVICE: HCPCS | Performed by: ANESTHESIOLOGY

## 2020-09-12 PROCEDURE — 87070 CULTURE OTHR SPECIMN AEROBIC: CPT | Mod: 91

## 2020-09-12 PROCEDURE — 501838 HCHG SUTURE GENERAL: Performed by: ORTHOPAEDIC SURGERY

## 2020-09-12 PROCEDURE — 160009 HCHG ANES TIME/MIN: Performed by: ORTHOPAEDIC SURGERY

## 2020-09-12 PROCEDURE — 96366 THER/PROPH/DIAG IV INF ADDON: CPT

## 2020-09-12 RX ORDER — OXYCODONE HYDROCHLORIDE 5 MG/1
5 TABLET ORAL EVERY 4 HOURS PRN
Status: DISCONTINUED | OUTPATIENT
Start: 2020-09-12 | End: 2020-09-13

## 2020-09-12 RX ORDER — HYDROMORPHONE HYDROCHLORIDE 1 MG/ML
0.4 INJECTION, SOLUTION INTRAMUSCULAR; INTRAVENOUS; SUBCUTANEOUS
Status: DISCONTINUED | OUTPATIENT
Start: 2020-09-12 | End: 2020-09-13 | Stop reason: HOSPADM

## 2020-09-12 RX ORDER — HYDROMORPHONE HYDROCHLORIDE 1 MG/ML
0.2 INJECTION, SOLUTION INTRAMUSCULAR; INTRAVENOUS; SUBCUTANEOUS
Status: DISCONTINUED | OUTPATIENT
Start: 2020-09-12 | End: 2020-09-13 | Stop reason: HOSPADM

## 2020-09-12 RX ORDER — CEFAZOLIN SODIUM 1 G/3ML
INJECTION, POWDER, FOR SOLUTION INTRAMUSCULAR; INTRAVENOUS PRN
Status: DISCONTINUED | OUTPATIENT
Start: 2020-09-12 | End: 2020-09-12 | Stop reason: SURG

## 2020-09-12 RX ORDER — POLYETHYLENE GLYCOL 3350 17 G/17G
1 POWDER, FOR SOLUTION ORAL
Status: DISCONTINUED | OUTPATIENT
Start: 2020-09-12 | End: 2020-09-15

## 2020-09-12 RX ORDER — CITALOPRAM 20 MG/1
20 TABLET ORAL DAILY
Status: CANCELLED | OUTPATIENT
Start: 2020-09-13

## 2020-09-12 RX ORDER — SODIUM CHLORIDE 9 MG/ML
INJECTION, SOLUTION INTRAVENOUS
Status: COMPLETED
Start: 2020-09-12 | End: 2020-09-12

## 2020-09-12 RX ORDER — MIDAZOLAM HYDROCHLORIDE 1 MG/ML
1 INJECTION INTRAMUSCULAR; INTRAVENOUS
Status: DISCONTINUED | OUTPATIENT
Start: 2020-09-12 | End: 2020-09-13 | Stop reason: HOSPADM

## 2020-09-12 RX ORDER — ONDANSETRON 2 MG/ML
INJECTION INTRAMUSCULAR; INTRAVENOUS PRN
Status: DISCONTINUED | OUTPATIENT
Start: 2020-09-12 | End: 2020-09-12 | Stop reason: SURG

## 2020-09-12 RX ORDER — ACETAMINOPHEN 325 MG/1
650 TABLET ORAL EVERY 6 HOURS PRN
Status: CANCELLED | OUTPATIENT
Start: 2020-09-12

## 2020-09-12 RX ORDER — HALOPERIDOL 5 MG/ML
1 INJECTION INTRAMUSCULAR
Status: DISCONTINUED | OUTPATIENT
Start: 2020-09-12 | End: 2020-09-13 | Stop reason: HOSPADM

## 2020-09-12 RX ORDER — CITALOPRAM 20 MG/1
20 TABLET ORAL DAILY
Status: DISCONTINUED | OUTPATIENT
Start: 2020-09-13 | End: 2020-09-16 | Stop reason: HOSPADM

## 2020-09-12 RX ORDER — PROMETHAZINE HYDROCHLORIDE 12.5 MG/1
12.5-25 SUPPOSITORY RECTAL EVERY 4 HOURS PRN
Status: DISCONTINUED | OUTPATIENT
Start: 2020-09-12 | End: 2020-09-15

## 2020-09-12 RX ORDER — ONDANSETRON 2 MG/ML
4 INJECTION INTRAMUSCULAR; INTRAVENOUS EVERY 4 HOURS PRN
Status: CANCELLED | OUTPATIENT
Start: 2020-09-12

## 2020-09-12 RX ORDER — MORPHINE SULFATE 4 MG/ML
2-5 INJECTION, SOLUTION INTRAMUSCULAR; INTRAVENOUS EVERY 4 HOURS PRN
Status: DISCONTINUED | OUTPATIENT
Start: 2020-09-12 | End: 2020-09-15

## 2020-09-12 RX ORDER — ONDANSETRON 4 MG/1
4 TABLET, ORALLY DISINTEGRATING ORAL EVERY 4 HOURS PRN
Status: CANCELLED | OUTPATIENT
Start: 2020-09-12

## 2020-09-12 RX ORDER — PROCHLORPERAZINE EDISYLATE 5 MG/ML
5-10 INJECTION INTRAMUSCULAR; INTRAVENOUS EVERY 4 HOURS PRN
Status: CANCELLED | OUTPATIENT
Start: 2020-09-12

## 2020-09-12 RX ORDER — PROMETHAZINE HYDROCHLORIDE 25 MG/1
12.5-25 TABLET ORAL EVERY 4 HOURS PRN
Status: DISCONTINUED | OUTPATIENT
Start: 2020-09-12 | End: 2020-09-15

## 2020-09-12 RX ORDER — PROMETHAZINE HYDROCHLORIDE 25 MG/1
12.5-25 TABLET ORAL EVERY 4 HOURS PRN
Status: CANCELLED | OUTPATIENT
Start: 2020-09-12

## 2020-09-12 RX ORDER — ONDANSETRON 2 MG/ML
4 INJECTION INTRAMUSCULAR; INTRAVENOUS EVERY 4 HOURS PRN
Status: DISCONTINUED | OUTPATIENT
Start: 2020-09-12 | End: 2020-09-16 | Stop reason: HOSPADM

## 2020-09-12 RX ORDER — POLYETHYLENE GLYCOL 3350 17 G/17G
1 POWDER, FOR SOLUTION ORAL
Status: CANCELLED | OUTPATIENT
Start: 2020-09-12

## 2020-09-12 RX ORDER — ACETAMINOPHEN 325 MG/1
650 TABLET ORAL EVERY 6 HOURS PRN
Status: DISCONTINUED | OUTPATIENT
Start: 2020-09-12 | End: 2020-09-16 | Stop reason: HOSPADM

## 2020-09-12 RX ORDER — BISACODYL 10 MG
10 SUPPOSITORY, RECTAL RECTAL
Status: CANCELLED | OUTPATIENT
Start: 2020-09-12

## 2020-09-12 RX ORDER — ONDANSETRON 2 MG/ML
4 INJECTION INTRAMUSCULAR; INTRAVENOUS
Status: DISCONTINUED | OUTPATIENT
Start: 2020-09-12 | End: 2020-09-13 | Stop reason: HOSPADM

## 2020-09-12 RX ORDER — SODIUM CHLORIDE, SODIUM LACTATE, POTASSIUM CHLORIDE, CALCIUM CHLORIDE 600; 310; 30; 20 MG/100ML; MG/100ML; MG/100ML; MG/100ML
INJECTION, SOLUTION INTRAVENOUS CONTINUOUS
Status: DISCONTINUED | OUTPATIENT
Start: 2020-09-13 | End: 2020-09-13 | Stop reason: HOSPADM

## 2020-09-12 RX ORDER — OXYCODONE HYDROCHLORIDE 5 MG/1
5 TABLET ORAL EVERY 4 HOURS PRN
Status: CANCELLED | OUTPATIENT
Start: 2020-09-12

## 2020-09-12 RX ORDER — OXYCODONE HCL 5 MG/5 ML
10 SOLUTION, ORAL ORAL
Status: COMPLETED | OUTPATIENT
Start: 2020-09-12 | End: 2020-09-12

## 2020-09-12 RX ORDER — PROCHLORPERAZINE EDISYLATE 5 MG/ML
5-10 INJECTION INTRAMUSCULAR; INTRAVENOUS EVERY 4 HOURS PRN
Status: DISCONTINUED | OUTPATIENT
Start: 2020-09-12 | End: 2020-09-15

## 2020-09-12 RX ORDER — AMOXICILLIN 250 MG
2 CAPSULE ORAL 2 TIMES DAILY
Status: DISCONTINUED | OUTPATIENT
Start: 2020-09-13 | End: 2020-09-15

## 2020-09-12 RX ORDER — MEPERIDINE HYDROCHLORIDE 25 MG/ML
12.5 INJECTION INTRAMUSCULAR; INTRAVENOUS; SUBCUTANEOUS
Status: DISCONTINUED | OUTPATIENT
Start: 2020-09-12 | End: 2020-09-13 | Stop reason: HOSPADM

## 2020-09-12 RX ORDER — MORPHINE SULFATE 4 MG/ML
2-5 INJECTION, SOLUTION INTRAMUSCULAR; INTRAVENOUS EVERY 4 HOURS PRN
Status: CANCELLED | OUTPATIENT
Start: 2020-09-12

## 2020-09-12 RX ORDER — PROMETHAZINE HYDROCHLORIDE 25 MG/1
12.5-25 SUPPOSITORY RECTAL EVERY 4 HOURS PRN
Status: CANCELLED | OUTPATIENT
Start: 2020-09-12

## 2020-09-12 RX ORDER — OXYCODONE HCL 5 MG/5 ML
5 SOLUTION, ORAL ORAL
Status: COMPLETED | OUTPATIENT
Start: 2020-09-12 | End: 2020-09-12

## 2020-09-12 RX ORDER — SODIUM CHLORIDE, SODIUM LACTATE, POTASSIUM CHLORIDE, CALCIUM CHLORIDE 600; 310; 30; 20 MG/100ML; MG/100ML; MG/100ML; MG/100ML
INJECTION, SOLUTION INTRAVENOUS
Status: DISCONTINUED | OUTPATIENT
Start: 2020-09-12 | End: 2020-09-12 | Stop reason: SURG

## 2020-09-12 RX ORDER — LIDOCAINE HYDROCHLORIDE 20 MG/ML
INJECTION, SOLUTION EPIDURAL; INFILTRATION; INTRACAUDAL; PERINEURAL PRN
Status: DISCONTINUED | OUTPATIENT
Start: 2020-09-12 | End: 2020-09-12 | Stop reason: SURG

## 2020-09-12 RX ORDER — AMOXICILLIN 250 MG
2 CAPSULE ORAL 2 TIMES DAILY
Status: CANCELLED | OUTPATIENT
Start: 2020-09-12

## 2020-09-12 RX ORDER — DIPHENHYDRAMINE HYDROCHLORIDE 50 MG/ML
12.5 INJECTION INTRAMUSCULAR; INTRAVENOUS
Status: DISCONTINUED | OUTPATIENT
Start: 2020-09-12 | End: 2020-09-13 | Stop reason: HOSPADM

## 2020-09-12 RX ORDER — HYDROMORPHONE HYDROCHLORIDE 1 MG/ML
0.1 INJECTION, SOLUTION INTRAMUSCULAR; INTRAVENOUS; SUBCUTANEOUS
Status: DISCONTINUED | OUTPATIENT
Start: 2020-09-12 | End: 2020-09-13 | Stop reason: HOSPADM

## 2020-09-12 RX ORDER — ONDANSETRON 4 MG/1
4 TABLET, ORALLY DISINTEGRATING ORAL EVERY 4 HOURS PRN
Status: DISCONTINUED | OUTPATIENT
Start: 2020-09-12 | End: 2020-09-16 | Stop reason: HOSPADM

## 2020-09-12 RX ORDER — DEXAMETHASONE SODIUM PHOSPHATE 4 MG/ML
INJECTION, SOLUTION INTRA-ARTICULAR; INTRALESIONAL; INTRAMUSCULAR; INTRAVENOUS; SOFT TISSUE PRN
Status: DISCONTINUED | OUTPATIENT
Start: 2020-09-12 | End: 2020-09-12 | Stop reason: SURG

## 2020-09-12 RX ORDER — BISACODYL 10 MG
10 SUPPOSITORY, RECTAL RECTAL
Status: DISCONTINUED | OUTPATIENT
Start: 2020-09-12 | End: 2020-09-15

## 2020-09-12 RX ADMIN — MORPHINE SULFATE 4 MG: 4 INJECTION INTRAVENOUS at 15:46

## 2020-09-12 RX ADMIN — OXYCODONE HYDROCHLORIDE 5 MG: 5 TABLET ORAL at 06:47

## 2020-09-12 RX ADMIN — DEXAMETHASONE SODIUM PHOSPHATE 8 MG: 4 INJECTION, SOLUTION INTRA-ARTICULAR; INTRALESIONAL; INTRAMUSCULAR; INTRAVENOUS; SOFT TISSUE at 23:01

## 2020-09-12 RX ADMIN — CEFAZOLIN 2.2 G: 330 INJECTION, POWDER, FOR SOLUTION INTRAMUSCULAR; INTRAVENOUS at 22:49

## 2020-09-12 RX ADMIN — LIDOCAINE HYDROCHLORIDE 60 MG: 20 INJECTION, SOLUTION EPIDURAL; INFILTRATION; INTRACAUDAL at 22:51

## 2020-09-12 RX ADMIN — SODIUM CHLORIDE, POTASSIUM CHLORIDE, SODIUM LACTATE AND CALCIUM CHLORIDE: 600; 310; 30; 20 INJECTION, SOLUTION INTRAVENOUS at 22:47

## 2020-09-12 RX ADMIN — FENTANYL CITRATE 50 MCG: 50 INJECTION INTRAMUSCULAR; INTRAVENOUS at 23:57

## 2020-09-12 RX ADMIN — SODIUM CHLORIDE, POTASSIUM CHLORIDE, SODIUM LACTATE AND CALCIUM CHLORIDE: 600; 310; 30; 20 INJECTION, SOLUTION INTRAVENOUS at 23:59

## 2020-09-12 RX ADMIN — VANCOMYCIN HYDROCHLORIDE 1250 MG: 500 INJECTION, POWDER, LYOPHILIZED, FOR SOLUTION INTRAVENOUS at 21:31

## 2020-09-12 RX ADMIN — FENTANYL CITRATE 100 MCG: 50 INJECTION, SOLUTION INTRAMUSCULAR; INTRAVENOUS at 22:49

## 2020-09-12 RX ADMIN — AMPICILLIN AND SULBACTAM 3 G: 2; 1 INJECTION, POWDER, FOR SOLUTION INTRAVENOUS at 06:42

## 2020-09-12 RX ADMIN — MORPHINE SULFATE 4 MG: 4 INJECTION INTRAVENOUS at 10:25

## 2020-09-12 RX ADMIN — OXYCODONE HYDROCHLORIDE 5 MG: 5 TABLET ORAL at 02:28

## 2020-09-12 RX ADMIN — CITALOPRAM HYDROBROMIDE 20 MG: 20 TABLET ORAL at 06:04

## 2020-09-12 RX ADMIN — MORPHINE SULFATE 5 MG: 4 INJECTION INTRAVENOUS at 06:00

## 2020-09-12 RX ADMIN — HYDROMORPHONE HYDROCHLORIDE 0.4 MG: 1 INJECTION, SOLUTION INTRAMUSCULAR; INTRAVENOUS; SUBCUTANEOUS at 23:54

## 2020-09-12 RX ADMIN — ACETAMINOPHEN 650 MG: 325 TABLET, FILM COATED ORAL at 12:43

## 2020-09-12 RX ADMIN — FENTANYL CITRATE 50 MCG: 50 INJECTION, SOLUTION INTRAMUSCULAR; INTRAVENOUS at 23:13

## 2020-09-12 RX ADMIN — OXYCODONE HYDROCHLORIDE 10 MG: 5 SOLUTION ORAL at 23:50

## 2020-09-12 RX ADMIN — AMPICILLIN AND SULBACTAM 3 G: 2; 1 INJECTION, POWDER, FOR SOLUTION INTRAVENOUS at 12:44

## 2020-09-12 RX ADMIN — MORPHINE SULFATE 4 MG: 4 INJECTION INTRAVENOUS at 00:07

## 2020-09-12 RX ADMIN — MORPHINE SULFATE 2 MG: 4 INJECTION INTRAVENOUS at 19:24

## 2020-09-12 RX ADMIN — SODIUM CHLORIDE 3 G: 900 INJECTION INTRAVENOUS at 18:57

## 2020-09-12 RX ADMIN — FENTANYL CITRATE 100 MCG: 50 INJECTION, SOLUTION INTRAMUSCULAR; INTRAVENOUS at 22:56

## 2020-09-12 RX ADMIN — ACETAMINOPHEN 650 MG: 325 TABLET, FILM COATED ORAL at 02:28

## 2020-09-12 RX ADMIN — PROPOFOL 200 MG: 10 INJECTION, EMULSION INTRAVENOUS at 22:51

## 2020-09-12 RX ADMIN — VANCOMYCIN HYDROCHLORIDE 1000 MG: 500 INJECTION, POWDER, LYOPHILIZED, FOR SOLUTION INTRAVENOUS at 06:42

## 2020-09-12 RX ADMIN — OXYCODONE HYDROCHLORIDE 5 MG: 5 TABLET ORAL at 12:41

## 2020-09-12 RX ADMIN — ONDANSETRON 4 MG: 2 INJECTION INTRAMUSCULAR; INTRAVENOUS at 23:19

## 2020-09-12 ASSESSMENT — LIFESTYLE VARIABLES
TOTAL SCORE: 0
CONSUMPTION TOTAL: NEGATIVE
TOTAL SCORE: 0
ON A TYPICAL DAY WHEN YOU DRINK ALCOHOL HOW MANY DRINKS DO YOU HAVE: 0
HOW MANY TIMES IN THE PAST YEAR HAVE YOU HAD 5 OR MORE DRINKS IN A DAY: 0
HAVE YOU EVER FELT YOU SHOULD CUT DOWN ON YOUR DRINKING: NO
EVER FELT BAD OR GUILTY ABOUT YOUR DRINKING: NO
TOTAL SCORE: 0
HAVE PEOPLE ANNOYED YOU BY CRITICIZING YOUR DRINKING: NO
EVER HAD A DRINK FIRST THING IN THE MORNING TO STEADY YOUR NERVES TO GET RID OF A HANGOVER: NO
ALCOHOL_USE: NO
AVERAGE NUMBER OF DAYS PER WEEK YOU HAVE A DRINK CONTAINING ALCOHOL: 0

## 2020-09-12 ASSESSMENT — COGNITIVE AND FUNCTIONAL STATUS - GENERAL
EATING MEALS: A LITTLE
SUGGESTED CMS G CODE MODIFIER MOBILITY: CK
TURNING FROM BACK TO SIDE WHILE IN FLAT BAD: A LITTLE
HELP NEEDED FOR BATHING: A LITTLE
DRESSING REGULAR UPPER BODY CLOTHING: A LITTLE
TOILETING: A LITTLE
DRESSING REGULAR LOWER BODY CLOTHING: A LITTLE
DAILY ACTIVITIY SCORE: 18
MOVING FROM LYING ON BACK TO SITTING ON SIDE OF FLAT BED: A LITTLE
SUGGESTED CMS G CODE MODIFIER DAILY ACTIVITY: CK
MOVING TO AND FROM BED TO CHAIR: A LITTLE
STANDING UP FROM CHAIR USING ARMS: A LITTLE
PERSONAL GROOMING: A LITTLE
MOBILITY SCORE: 18
WALKING IN HOSPITAL ROOM: A LITTLE
CLIMB 3 TO 5 STEPS WITH RAILING: A LITTLE

## 2020-09-12 ASSESSMENT — ENCOUNTER SYMPTOMS
FOCAL WEAKNESS: 0
SHORTNESS OF BREATH: 0
HEARTBURN: 0
DEPRESSION: 0
MYALGIAS: 1
WEAKNESS: 0
DIARRHEA: 0
SORE THROAT: 0
CHILLS: 0
COUGH: 0
VOMITING: 0
BLOOD IN STOOL: 0
DIZZINESS: 0
HEADACHES: 0
FEVER: 0
PALPITATIONS: 0
ABDOMINAL PAIN: 0
NAUSEA: 0
BACK PAIN: 0
HALLUCINATIONS: 0

## 2020-09-12 ASSESSMENT — FIBROSIS 4 INDEX
FIB4 SCORE: 0.54
FIB4 SCORE: 0.59

## 2020-09-12 ASSESSMENT — PAIN DESCRIPTION - PAIN TYPE
TYPE: ACUTE PAIN

## 2020-09-12 ASSESSMENT — PATIENT HEALTH QUESTIONNAIRE - PHQ9
1. LITTLE INTEREST OR PLEASURE IN DOING THINGS: NOT AT ALL
2. FEELING DOWN, DEPRESSED, IRRITABLE, OR HOPELESS: NOT AT ALL
SUM OF ALL RESPONSES TO PHQ9 QUESTIONS 1 AND 2: 0

## 2020-09-12 ASSESSMENT — PAIN SCALES - GENERAL: PAIN_LEVEL: 5

## 2020-09-12 NOTE — DISCHARGE PLANNING
Notified by MD pt is being transferred to [facility]. Pt going to T326/2    [employer] form completed and faxed    Called [facility] to obtain auth # and was notified system was down for maintenance and they're unable to access account. Was told to call after 6:30    COBRA packet completed and placed in chart.     Update: Called [facility] to obtain auth #. Was told my staff that [employer] will have to obtain auth # directly from [facility]

## 2020-09-12 NOTE — ASSESSMENT & PLAN NOTE
Status post successful treatment with antibiotics  He underwent I&D in the ER on 9/3/2020 and cultures grew strep viridans  He had resolution of his infection with oral antibiotics prior to his right lower extremity developing redness and pain

## 2020-09-12 NOTE — H&P
Discharge Summary/Re-admit H&P    CHIEF COMPLAINT ON ADMISSION  Chief Complaint   Patient presents with   • Leg Pain     to R leg. Was seen here earlier today for cellultis to same. Admits to long term IV drug use of heroin. Pt A&Ox4 and ambulatory. RLE is reddened, painful and swollen. Pt states DVT was ruled out earlier.       Reason for Admission  leg pain     Admission Date  9/11/2020    CODE STATUS  Full Code    HPI & HOSPITAL COURSE  This is a 29M w hx of IVDU and recent R shoulder abscess s/p drainage in ER, discharged on PO abx, admitted with R calf pain redness and swelling found to have right lower extremity cellulitis. Ultrasound negative for DVT.  Initially, vital signs and white count were normal however he appeared to be in excruciating pain.  His home Suboxone was held so that his pain could be adequately addressed if needed.  He was started on Unasyn and vancomycin.  A CT scan of the lower extremity was performed and revealed extensive edema in the right calf, no free air was seen however it was suggestive of myositis versus fasciitis.  The following day, the patient's pain had not improved significantly and his swelling was relatively unchanged.  His extremity remained red and exquisitely painful.  White count increased from normal to 16 despite broad-spectrum antibiotic therapy.  Due to slower than expected clinical improvement, I consulted orthopedic surgery to evaluate for necrotizing fasciitis versus compartment syndrome.  They examined the patient and reviewed his imaging and ultimately determined he would likely benefit from a washout of the lower leg.  Patient was made n.p.o.  Second MD OR presence was requested by orthopedic surgery therefore they requested transfer to [facility] in order to have appropriate OR backup if needed.    Therefore, he is discharged in guarded and stable condition to a critical access hospital.    Discharged prior to 2 midnights due to need for  transfer to [facility] for orthopedic intervention    Discharge Date  9/12/2020    FOLLOW UP ITEMS POST DISCHARGE  Patient will go to the OR [facility] 9/12/2020.  Please page Dr. Andrea upon the patient's arrival    DISCHARGE DIAGNOSES  Principal Problem:    Cellulitis of right leg POA: Unknown  Active Problems:    History of substance abuse (HCC) POA: Yes    Anxiety POA: Yes    Shoulder abscess POA: Unknown      FOLLOW UP  Page Dr. Andrea upon the patient's arrival     MEDICATIONS ON DISCHARGE     Medication List      ASK your doctor about these medications      Instructions   buprenorphine-naloxone 8-2 mg Subl SL  Commonly known as: SUBOXONE   Doctor's comments: NADEAN:   Place 1 Tab under tongue every day. Pt takes 12 mg 1 time a day  Dose: 1 Tab     citalopram 20 MG Tabs  Commonly known as: CELEXA   Take 1 Tab by mouth every day.  Dose: 20 mg     clindamycin 300 MG Caps  Commonly known as: CLEOCIN   Doctor's comments: Take with food  Take 1 Cap by mouth 3 times a day.  Dose: 300 mg     ibuprofen 800 MG Tabs  Commonly known as: MOTRIN   Doctor's comments: Take with food  Take 1 Tab by mouth every 8 hours as needed for Moderate Pain or Inflammation.  Dose: 800 mg            Allergies  No Known Allergies    DIET  Orders Placed This Encounter   Procedures   • Diet NPO     Standing Status:   Standing     Number of Occurrences:   1     Order Specific Question:   Restrict to:     Answer:   Strict [1]       ACTIVITY  As tolerated.  Weight bearing as tolerated    CONSULTATIONS  Dr. Underwood [facility]    PROCEDURES  None    LABORATORY  Lab Results   Component Value Date    SODIUM 134 (L) 09/12/2020    POTASSIUM 3.8 09/12/2020    CHLORIDE 97 09/12/2020    CO2 25 09/12/2020    GLUCOSE 126 (H) 09/12/2020    BUN 13 09/12/2020    CREATININE 0.71 09/12/2020        Lab Results   Component Value Date    WBC 16.3 (H) 09/12/2020    HEMOGLOBIN 12.4 (L) 09/12/2020    HEMATOCRIT 37.7 (L) 09/12/2020    PLATELETCT 188 09/12/2020         Total time of the discharge process exceeds 44 minutes.     24-Jun-2017 11:25

## 2020-09-12 NOTE — DISCHARGE SUMMARY
Discharge Summary    CHIEF COMPLAINT ON ADMISSION  Chief Complaint   Patient presents with   • Leg Pain     to R leg. Was seen here earlier today for cellultis to same. Admits to long term IV drug use of heroin. Pt A&Ox4 and ambulatory. RLE is reddened, painful and swollen. Pt states DVT was ruled out earlier.       Reason for Admission  leg pain     Admission Date  9/11/2020    CODE STATUS  Full Code    HPI & HOSPITAL COURSE  This is a 29M w hx of IVDU and recent R shoulder abscess s/p drainage in ER, discharged on PO abx, admitted with R calf pain redness and swelling found to have right lower extremity cellulitis. Ultrasound negative for DVT.  Initially, vital signs and white count were normal however he appeared to be in excruciating pain.  His home Suboxone was held so that his pain could be adequately addressed if needed.  He was started on Unasyn and vancomycin.  A CT scan of the lower extremity was performed and revealed extensive edema in the right calf, no free air was seen however it was suggestive of myositis versus fasciitis.  The following day, the patient's pain had not improved significantly and his swelling was relatively unchanged.  His extremity remained red and exquisitely painful.  White count increased from normal to 16 despite broad-spectrum antibiotic therapy.  Due to slower than expected clinical improvement, I consulted orthopedic surgery to evaluate for necrotizing fasciitis versus compartment syndrome.  They examined the patient and reviewed his imaging and ultimately determined he would likely benefit from a washout of the lower leg.  Patient was made n.p.o.  Second MD OR presence was requested by orthopedic surgery therefore they requested transfer to [facility] in order to have appropriate OR backup if needed.    Therefore, he is discharged in guarded and stable condition to a critical access hospital.    Discharged prior to 2 midnights due to need for transfer to  [facility] for orthopedic intervention    Discharge Date  9/12/2020    FOLLOW UP ITEMS POST DISCHARGE  [facility] will go [facility].  Please page Dr. Andrea upon the patient's arrival    DISCHARGE DIAGNOSES  Principal Problem:    Cellulitis of right leg POA: Unknown  Active Problems:    History of substance abuse (HCC) POA: Yes    Anxiety POA: Yes    Shoulder abscess POA: Unknown      FOLLOW UP  Page Dr. Andrea upon the patient's arrival     MEDICATIONS ON DISCHARGE     Medication List      ASK your doctor about these medications      Instructions   buprenorphine-naloxone 8-2 mg Subl SL  Commonly known as: SUBOXONE   Doctor's comments: NADEAN:   Place 1 Tab under tongue every day. Pt takes 12 mg 1 time a day  Dose: 1 Tab     citalopram 20 MG Tabs  Commonly known as: CELEXA   Take 1 Tab by mouth every day.  Dose: 20 mg     clindamycin 300 MG Caps  Commonly known as: CLEOCIN   Doctor's comments: Take with food  Take 1 Cap by mouth 3 times a day.  Dose: 300 mg     ibuprofen 800 MG Tabs  Commonly known as: MOTRIN   Doctor's comments: Take with food  Take 1 Tab by mouth every 8 hours as needed for Moderate Pain or Inflammation.  Dose: 800 mg            Allergies  No Known Allergies    DIET  Orders Placed This Encounter   Procedures   • Diet NPO     Standing Status:   Standing     Number of Occurrences:   1     Order Specific Question:   Restrict to:     Answer:   Strict [1]       ACTIVITY  As tolerated.  Weight bearing as tolerated    CONSULTATIONS  Dr. Underwood [facility]    PROCEDURES  None    LABORATORY  Lab Results   Component Value Date    SODIUM 134 (L) 09/12/2020    POTASSIUM 3.8 09/12/2020    CHLORIDE 97 09/12/2020    CO2 25 09/12/2020    GLUCOSE 126 (H) 09/12/2020    BUN 13 09/12/2020    CREATININE 0.71 09/12/2020        Lab Results   Component Value Date    WBC 16.3 (H) 09/12/2020    HEMOGLOBIN 12.4 (L) 09/12/2020    HEMATOCRIT 37.7 (L) 09/12/2020    PLATELETCT 188 09/12/2020        Total time of  the discharge process exceeds 44 minutes.

## 2020-09-12 NOTE — CARE PLAN
Problem: Knowledge Deficit  Goal: Knowledge of disease process/condition, treatment plan, diagnostic tests, and medications will improve  Outcome: PROGRESSING AS EXPECTED  Note: Updated pt on plan of care, no questions at this time.      Problem: Pain Management  Goal: Pain level will decrease to patient's comfort goal  Outcome: PROGRESSING AS EXPECTED  Note: Educated pt on pain medications available and due time. Will continue to monitor pain and medicate per MAR

## 2020-09-12 NOTE — DIETARY
"Nutrition services: Day 0 of admit.  Luther Forrester is a 29 y.o. male with admitting DX of Cellulitis of right leg    Consult received for poor PO with 24-33 lb weight loss over 3 months reported on admit screen    Visit with pt at bedside. Pt said he felt like crap. Pt agreeable to nutrition supplement (Boost Plus or magic cup) with meals to optimize PO intake. Pt reports  lb 3 months ago, PO intake <50% x3 months d/t illicit drug use. Pt confirmed noticeable fat/muscle loss did not specify where.    Assessment:  Height: 177.8 cm (5' 10\")  Weight: 66.5 kg (146 lb 9.7 oz)  Body mass index is 21.04 kg/m²., BMI classification: Normal  Diet/Intake: Regular, % x2 meals    Evaluation:   1. Weight trend: 14% weight loss over 3 months (severe). No measured weights available per chart review from 11/2018-9/2/2020.  2. PMH: Heroin use x10 years  3. Meds: Zofran/Compazine PRN  4. Limited NFPE: No fat/muscle loss observed in head/face/neck    Malnutrition Risk: Pt with severe malnutrition in the context of social circumstance related to heroin use as evidenced by poor PO intake <50% x3 months, severe weight loss (14% over 3 months).      Recommendations/Plan:  1. Nutrition supplement with meals TID (Boost Plus or magic cup)   2. Encourage intake of meals  3. Document intake of all meals as % taken in ADL's to provide interdisciplinary communication across all shifts.   4. Monitor weight.  5. Nutrition rep will continue to see patient for ongoing meal and snack preferences.     RD following        "

## 2020-09-12 NOTE — PROGRESS NOTES
Received report from DINORAH Denton. Safety precautions in place. Bed locked and low. Offered assist. Call light and belongings within reach.

## 2020-09-12 NOTE — PROGRESS NOTES
"Pharmacy Kinetics 29 y.o. male on vancomycin day # 2 2020    Currently on Vancomycin 1000 mg iv q8hr    IIndication for Treatment: Cellulitis of right leg     Pertinent history per medical record: Admitted on 2020 for right lower extremitie erythema and pain. Recently treated for right shoulder abscess that grew strep viridans on 20. Left AMA and returned today. Ultrasound negative for DVT. History of heroin use     Other antibiotics: Unasyn 3 g once     Allergies: Patient has no known allergies.      List concerns for renal function: none     Pertinent cultures to date:    (prior admission) - strep viridans  09/10 BCX2 NGTD    Recent Labs     09/10/20  1530 20  0503   WBC 9.3 16.3*   NEUTSPOLYS 71.60 83.90*     Recent Labs     09/10/20  1530 20  0503   BUN 18 13   CREATININE 0.95 0.71   ALBUMIN 4.1  --      Recent Labs     20  0503   VANCOTROUGH 8.3*       Intake/Output Summary (Last 24 hours) at 2020 1139  Last data filed at 2020 0800  Gross per 24 hour   Intake 210 ml   Output 1250 ml   Net -1040 ml      /49   Pulse 90   Temp 36.8 °C (98.3 °F) (Oral)   Resp 18   Ht 1.778 m (5' 10\")   Wt 66.5 kg (146 lb 9.7 oz)   SpO2 94%  Temp (24hrs), Av.8 °C (98.3 °F), Min:36.6 °C (97.9 °F), Max:37.2 °C (98.9 °F)      A/P   1. Vancomycin dose change: 1500 mg q 8 h  2. Next vancomycin level:  04:30  3. Goal trough: 10-15 mcg/ml  4. Comments: Will monitor and adjust per protocol    [address].    "

## 2020-09-12 NOTE — CONSULTS
Orthopaedic Surgery Consult Note:    Roland Underwood M.D.  Date & Time note created:    9/12/2020   2:07 PM     Referring MD:  Dr. Mtz    Patient ID:   Name:             Luther Forrester   YOB: 1990  Age:                 29 y.o.  male   MRN:               [id number]                                                             Reason for Consult:      Right leg swelling and pain    History of Present Illness:    30yo M IV drug user with right leg pain.  The patient notes he has had difficulty walking on the leg for 3-4 days.  It has gotten progressively worse.  He denies any fevers.  He was recently admitted and drained an abscess over the right shoulder.     Review of Systems:      Constitutional: Denies fevers, Denies weight changes  Eyes: Denies changes in vision, no eye pain  Ears/Nose/Throat/Mouth: Denies nasal congestion or sore throat   Cardiovascular: Denies chest pain   Respiratory: Denies shortness of breath , Denies cough  Gastrointestinal/Hepatic: Denies abdominal pain, nausea, vomiting, diarrhea, constipation or GI bleeding   Genitourinary: Denies dysuria or frequency  Musculoskeletal/Rheum: right leg pain  Skin: Denies rash  Neurological: Denies headache, confusion, memory loss or focal weakness/parasthesias  Psychiatric: denies mood disorder   Endocrine: Sussy thyroid problems  Heme/Oncology/Lymph Nodes: Denies enlarged lymph nodes, denies brusing or known bleeding disorder  All other systems were reviewed and are negative (AMA/CMS criteria)                Past Medical History:   History reviewed. No pertinent past medical history.  Active Hospital Problems    Diagnosis   • History of substance abuse (HCC) [F19.11]     Priority: Medium   • Shoulder abscess [L02.419]   • Cellulitis of right leg [L03.115]   • Anxiety [F41.9]       Past Surgical History:  Past Surgical History:   Procedure Laterality Date   • DENTAL EXTRACTION(S)      wisdom teeth   • TONSILLECTOMY          Hospital Medications:    Current Facility-Administered Medications:   •  vancomycin (VANCOCIN) 1,500 mg in  mL IVPB, 1,500 mg, Intravenous, Q8HR, Teagan Hernandez D.O.  •  citalopram (CELEXA) tablet 20 mg, 20 mg, Oral, DAILY, Brenda Cano M.D., 20 mg at 09/12/20 0604  •  senna-docusate (PERICOLACE or SENOKOT S) 8.6-50 MG per tablet 2 Tab, 2 Tab, Oral, BID **AND** polyethylene glycol/lytes (MIRALAX) PACKET 1 Packet, 1 Packet, Oral, QDAY PRN **AND** magnesium hydroxide (MILK OF MAGNESIA) suspension 30 mL, 30 mL, Oral, QDAY PRN **AND** bisacodyl (DULCOLAX) suppository 10 mg, 10 mg, Rectal, QDAY PRN, Brenda Cano M.D.  •  ondansetron (ZOFRAN) syringe/vial injection 4 mg, 4 mg, Intravenous, Q4HRS PRN, Brenda Cano M.D.  •  ondansetron (ZOFRAN ODT) dispertab 4 mg, 4 mg, Oral, Q4HRS PRN, Brenda Cano M.D.  •  promethazine (PHENERGAN) tablet 12.5-25 mg, 12.5-25 mg, Oral, Q4HRS PRN, Brenda Cano M.D.  •  promethazine (PHENERGAN) suppository 12.5-25 mg, 12.5-25 mg, Rectal, Q4HRS PRN, Brenda Cano M.D.  •  prochlorperazine (COMPAZINE) injection 5-10 mg, 5-10 mg, Intravenous, Q4HRS PRN, Brenda Cano M.D.  •  acetaminophen (TYLENOL) tablet 650 mg, 650 mg, Oral, Q6HRS PRN, Brenda Cano M.D., 650 mg at 09/12/20 1243  •  MD Alert...Vancomycin per Pharmacy, , Other, PHARMACY TO DOSE, Brenda Cano M.D.  •  morphine (pf) 4 MG/ML injection 2-5 mg, 2-5 mg, Intravenous, Q4HRS PRN, JOHAN Coelho.O., 4 mg at 09/12/20 1025  •  oxyCODONE immediate-release (ROXICODONE) tablet 5 mg, 5 mg, Oral, Q4HRS PRN, Teagan Hernandez D.O., 5 mg at 09/12/20 1241  •  ampicillin/sulbactam (UNASYN) 3 g in  mL IVPB, 3 g, Intravenous, Q6HRS, JOHAN Coelho.O., Stopped at 09/12/20 1314    Current Outpatient Medications:  Medications Prior to Admission   Medication Sig Dispense Refill Last Dose   • buprenorphine-naloxone (SUBOXONE) 8-2  mg SL Tab SL Place 1 Tab under tongue every day. Pt takes 12 mg 1 time a day   9/10/2020 at 1000   • clindamycin (CLEOCIN) 300 MG Cap Take 1 Cap by mouth 3 times a day. 40 Cap 0 unkown at unknown   • ibuprofen (MOTRIN) 800 MG Tab Take 1 Tab by mouth every 8 hours as needed for Moderate Pain or Inflammation. 30 Tab 0 9/11/2020 at 0100   • citalopram (CELEXA) 20 MG Tab Take 1 Tab by mouth every day. 30 Tab 3 unknown at Unknown time       Medication Allergy:  No Known Allergies    Family History:  History reviewed. No pertinent family history.    Social History:  Social History     Socioeconomic History   • Marital status: Single     Spouse name: Not on file   • Number of children: Not on file   • Years of education: Not on file   • Highest education level: Not on file   Occupational History   • Not on file   Social Needs   • Financial resource strain: Not on file   • Food insecurity     Worry: Not on file     Inability: Not on file   • Transportation needs     Medical: Not on file     Non-medical: Not on file   Tobacco Use   • Smoking status: Current Every Day Smoker     Packs/day: 0.50   • Smokeless tobacco: Former User     Types: Chew     Quit date: 5/9/2018   • Tobacco comment: occassionally on chew 1-2 times a day   Substance and Sexual Activity   • Alcohol use: No   • Drug use: Yes     Types: IV, Methamphetamines, Intravenous     Comment: heroin/meth   • Sexual activity: Not on file   Lifestyle   • Physical activity     Days per week: Not on file     Minutes per session: Not on file   • Stress: Not on file   Relationships   • Social connections     Talks on phone: Not on file     Gets together: Not on file     Attends Protestant service: Not on file     Active member of club or organization: Not on file     Attends meetings of clubs or organizations: Not on file     Relationship status: Not on file   • Intimate partner violence     Fear of current or ex partner: Not on file     Emotionally abused: Not on file      "Physically abused: Not on file     Forced sexual activity: Not on file   Other Topics Concern   • Not on file   Social History Narrative   • Not on file         Physical Exam:  Vitals/ General Appearance:   Weight/BMI: Body mass index is 21.04 kg/m².  /49   Pulse 90   Temp 36.8 °C (98.3 °F) (Oral)   Resp 18   Ht 1.778 m (5' 10\")   Wt 66.5 kg (146 lb 9.7 oz)   SpO2 94%   Vitals:    09/11/20 1900 09/12/20 0200 09/12/20 0716 09/12/20 1113   BP: 145/64 133/64 119/59 125/49   Pulse: 100 97 73 90   Resp: 18 18 18 18   Temp: 36.7 °C (98.1 °F) 36.9 °C (98.4 °F) 36.8 °C (98.3 °F) 36.8 °C (98.3 °F)   TempSrc: Oral Oral Oral Oral   SpO2: 97% 96% 98% 94%   Weight:  66.5 kg (146 lb 9.7 oz)     Height:           Constitutional:   Well developed, Well nourished, No acute distress  HENMT:  Normocephalic, Atraumatic, Oropharynx moist mucous membranes, No oral exudates, Nose normal.  No thyromegaly.  Eyes:  EOMI, Conjunctiva normal, No discharge.  Neck:  Normal range of motion, No cervical tenderness,  no JVD.  Cardiovascular:  Regular rate and rhythm  Lungs:  Normal breathing  Abdomen: Soft, non-tender, non-distended.  Skin: Warm, Dry, No erythema, No rash, no induration.  Neurologic: Alert & oriented x 3, No focal deficits noted, cranial nerves II through X are grossly intact.  Psychiatric: Affect normal, Judgment normal, Mood normal.  Musculoskeletal:   Right leg: tender to palpation, mild cellulitis  Tender over anterior compartment, posterior compartment  Skin is tight, swollen  +EHL/FHL/TA   SILT m/l/1st dws  wwp toes    Lab Data Review:  Recent Results (from the past 24 hour(s))   VANCOMYCIN TROUGH LEVEL    Collection Time: 09/12/20  5:03 AM   Result Value Ref Range    Vancomycin Trough 8.3 (L) 10.0 - 20.0 ug/mL   CBC WITH DIFFERENTIAL    Collection Time: 09/12/20  5:03 AM   Result Value Ref Range    WBC 16.3 (H) 4.8 - 10.8 K/uL    RBC 4.53 (L) 4.70 - 6.10 M/uL    Hemoglobin 12.4 (L) 14.0 - 18.0 g/dL    Hematocrit " 37.7 (L) 42.0 - 52.0 %    MCV 83.2 81.4 - 97.8 fL    MCH 27.4 27.0 - 33.0 pg    MCHC 32.9 (L) 33.7 - 35.3 g/dL    RDW 37.4 35.9 - 50.0 fL    Platelet Count 188 164 - 446 K/uL    MPV 9.7 9.0 - 12.9 fL    Neutrophils-Polys 83.90 (H) 44.00 - 72.00 %    Lymphocytes 8.80 (L) 22.00 - 41.00 %    Monocytes 6.10 0.00 - 13.40 %    Eosinophils 0.50 0.00 - 6.90 %    Basophils 0.20 0.00 - 1.80 %    Immature Granulocytes 0.50 0.00 - 0.90 %    Nucleated RBC 0.00 /100 WBC    Neutrophils (Absolute) 13.70 (H) 1.82 - 7.42 K/uL    Lymphs (Absolute) 1.44 1.00 - 4.80 K/uL    Monos (Absolute) 1.00 (H) 0.00 - 0.85 K/uL    Eos (Absolute) 0.08 0.00 - 0.51 K/uL    Baso (Absolute) 0.03 0.00 - 0.12 K/uL    Immature Granulocytes (abs) 0.08 0.00 - 0.11 K/uL    NRBC (Absolute) 0.00 K/uL   Basic Metabolic Panel    Collection Time: 09/12/20  5:03 AM   Result Value Ref Range    Sodium 134 (L) 135 - 145 mmol/L    Potassium 3.8 3.6 - 5.5 mmol/L    Chloride 97 96 - 112 mmol/L    Co2 25 20 - 33 mmol/L    Glucose 126 (H) 65 - 99 mg/dL    Bun 13 8 - 22 mg/dL    Creatinine 0.71 0.50 - 1.40 mg/dL    Calcium 8.8 8.4 - 10.2 mg/dL    Anion Gap 12.0 7.0 - 16.0   ESTIMATED GFR    Collection Time: 09/12/20  5:03 AM   Result Value Ref Range    GFR If African American >60 >60 mL/min/1.73 m 2    GFR If Non African American >60 >60 mL/min/1.73 m 2   PROCALCITONIN    Collection Time: 09/12/20  5:03 AM   Result Value Ref Range    Procalcitonin 0.14 <0.25 ng/mL   CREATINE KINASE    Collection Time: 09/12/20 12:21 PM   Result Value Ref Range    CPK Total 74 0 - 154 U/L   Sed Rate    Collection Time: 09/12/20 12:21 PM   Result Value Ref Range    Sed Rate Westergren 37 (H) 0 - 15 mm/hour   CRP QUANTITIVE (NON-CARDIAC)    Collection Time: 09/12/20 12:21 PM   Result Value Ref Range    Stat C-Reactive Protein 15.45 (H) 0.00 - 0.75 mg/dL   LACTIC ACID    Collection Time: 09/12/20 12:21 PM   Result Value Ref Range    Lactic Acid 2.1 (H) 0.5 - 2.0 mmol/L       Imaging:    CT-EXTREMITY, LOWER WITH RIGHT   Final Result      1.  No evidence of tibial or fibular fracture or bony destructive change.      2.  Extensive edema/induration of the subcutaneous fat circumferentially involving the lower leg. There is fluid extending superficial to the lower leg muscles most prominently overlying the medial head of the gastrocnemius muscle which measures 9 mm in    depth. Fluid also dissects between the deep muscle layers most prominently between the gastrocnemius and soleus muscles. Consideration should be given for fasciitis as well as myositis. Cellulitis is seen as well.      3.  No evidence of intramuscular abscess.          Assessment: 28yo M with concern for fasciitis    Plan:   NPO  Transfer to [facility] for I and D of leg.  Patient has significant pain and swelling.  Would likely benefit.  I have spoken to my colleague Dr. Andera at [facility] who will evaluate the patient.    Continue IV antibioitcs.   Continue pain management  Admit to hospitalist at [facility].   Please call Dr. Andrea upon arrival.           Roland Underwood M.D.  [facility]

## 2020-09-12 NOTE — PROGRESS NOTES
~1800 assumed care of patient. Pot c/o pain in RLE medicated PRN see MAR. Ice pack provided. Bed in low and locked position. Call bell and bedside table within reach. All safety measures in place.

## 2020-09-12 NOTE — PROGRESS NOTES
Transfer Center:    Received IP to Direct Admit transfer request from [facility] @ [phone]  Sending Physician:  Dr. Hernandez   Specialist consulted:  Dr. Underwood, Orthopedics   Diagnosis:  Right leg cellulitis   Patient accepted by:  Dr. Hernandez     Patient coming via:  [facility]  ETA:  TBD room availability   Nursing to notify Admitting MD when patient arrives.     Plan:  Transfer Center to assist as needed.

## 2020-09-12 NOTE — DISCHARGE PLANNING
Received Transport Form @ 1409  Spoke to Natasha @ [facility]   Transport is scheduled for 9/12 @1630 [place] [facility] [place]

## 2020-09-13 PROBLEM — R73.9 HYPERGLYCEMIA: Status: ACTIVE | Noted: 2020-09-13

## 2020-09-13 LAB
ALBUMIN SERPL BCP-MCNC: 3.6 G/DL (ref 3.2–4.9)
AMPHET UR QL SCN: POSITIVE
BARBITURATES UR QL SCN: NEGATIVE
BASOPHILS # BLD AUTO: 0.2 % (ref 0–1.8)
BASOPHILS # BLD: 0.03 K/UL (ref 0–0.12)
BENZODIAZ UR QL SCN: NEGATIVE
BUN SERPL-MCNC: 15 MG/DL (ref 8–22)
BZE UR QL SCN: NEGATIVE
CALCIUM SERPL-MCNC: 9.2 MG/DL (ref 8.5–10.5)
CANNABINOIDS UR QL SCN: NEGATIVE
CHLORIDE SERPL-SCNC: 99 MMOL/L (ref 96–112)
CO2 SERPL-SCNC: 24 MMOL/L (ref 20–33)
CREAT SERPL-MCNC: 0.75 MG/DL (ref 0.5–1.4)
EOSINOPHIL # BLD AUTO: 0 K/UL (ref 0–0.51)
EOSINOPHIL NFR BLD: 0 % (ref 0–6.9)
ERYTHROCYTE [DISTWIDTH] IN BLOOD BY AUTOMATED COUNT: 37.9 FL (ref 35.9–50)
EST. AVERAGE GLUCOSE BLD GHB EST-MCNC: 111 MG/DL
GLUCOSE SERPL-MCNC: 230 MG/DL (ref 65–99)
GRAM STN SPEC: NORMAL
GRAM STN SPEC: NORMAL
HBA1C MFR BLD: 5.5 % (ref 0–5.6)
HCT VFR BLD AUTO: 39 % (ref 42–52)
HGB BLD-MCNC: 13 G/DL (ref 14–18)
IMM GRANULOCYTES # BLD AUTO: 0.11 K/UL (ref 0–0.11)
IMM GRANULOCYTES NFR BLD AUTO: 0.6 % (ref 0–0.9)
LYMPHOCYTES # BLD AUTO: 0.81 K/UL (ref 1–4.8)
LYMPHOCYTES NFR BLD: 4.2 % (ref 22–41)
MAGNESIUM SERPL-MCNC: 2.1 MG/DL (ref 1.5–2.5)
MCH RBC QN AUTO: 28.3 PG (ref 27–33)
MCHC RBC AUTO-ENTMCNC: 33.3 G/DL (ref 33.7–35.3)
MCV RBC AUTO: 85 FL (ref 81.4–97.8)
METHADONE UR QL SCN: NEGATIVE
MONOCYTES # BLD AUTO: 0.38 K/UL (ref 0–0.85)
MONOCYTES NFR BLD AUTO: 2 % (ref 0–13.4)
NEUTROPHILS # BLD AUTO: 18.13 K/UL (ref 1.82–7.42)
NEUTROPHILS NFR BLD: 93 % (ref 44–72)
NRBC # BLD AUTO: 0 K/UL
NRBC BLD-RTO: 0 /100 WBC
OPIATES UR QL SCN: POSITIVE
OXYCODONE UR QL SCN: POSITIVE
PCP UR QL SCN: NEGATIVE
PHOSPHATE SERPL-MCNC: 3.2 MG/DL (ref 2.5–4.5)
PLATELET # BLD AUTO: 206 K/UL (ref 164–446)
PMV BLD AUTO: 9.9 FL (ref 9–12.9)
POTASSIUM SERPL-SCNC: 4.5 MMOL/L (ref 3.6–5.5)
PROPOXYPH UR QL SCN: NEGATIVE
RBC # BLD AUTO: 4.59 M/UL (ref 4.7–6.1)
SIGNIFICANT IND 70042: NORMAL
SIGNIFICANT IND 70042: NORMAL
SITE SITE: NORMAL
SITE SITE: NORMAL
SODIUM SERPL-SCNC: 137 MMOL/L (ref 135–145)
SOURCE SOURCE: NORMAL
SOURCE SOURCE: NORMAL
VANCOMYCIN TROUGH SERPL-MCNC: 14 UG/ML (ref 10–20)
WBC # BLD AUTO: 19.5 K/UL (ref 4.8–10.8)

## 2020-09-13 PROCEDURE — A9270 NON-COVERED ITEM OR SERVICE: HCPCS | Performed by: INTERNAL MEDICINE

## 2020-09-13 PROCEDURE — 700105 HCHG RX REV CODE 258: Performed by: INTERNAL MEDICINE

## 2020-09-13 PROCEDURE — 80069 RENAL FUNCTION PANEL: CPT

## 2020-09-13 PROCEDURE — 700102 HCHG RX REV CODE 250 W/ 637 OVERRIDE(OP): Performed by: PHYSICIAN ASSISTANT

## 2020-09-13 PROCEDURE — 83735 ASSAY OF MAGNESIUM: CPT

## 2020-09-13 PROCEDURE — 700102 HCHG RX REV CODE 250 W/ 637 OVERRIDE(OP): Performed by: INTERNAL MEDICINE

## 2020-09-13 PROCEDURE — A9270 NON-COVERED ITEM OR SERVICE: HCPCS | Performed by: PHYSICIAN ASSISTANT

## 2020-09-13 PROCEDURE — 770001 HCHG ROOM/CARE - MED/SURG/GYN PRIV*

## 2020-09-13 PROCEDURE — 80307 DRUG TEST PRSMV CHEM ANLYZR: CPT

## 2020-09-13 PROCEDURE — 83036 HEMOGLOBIN GLYCOSYLATED A1C: CPT

## 2020-09-13 PROCEDURE — 36415 COLL VENOUS BLD VENIPUNCTURE: CPT

## 2020-09-13 PROCEDURE — 700111 HCHG RX REV CODE 636 W/ 250 OVERRIDE (IP): Performed by: ANESTHESIOLOGY

## 2020-09-13 PROCEDURE — 85025 COMPLETE CBC W/AUTO DIFF WBC: CPT

## 2020-09-13 PROCEDURE — 700111 HCHG RX REV CODE 636 W/ 250 OVERRIDE (IP): Performed by: INTERNAL MEDICINE

## 2020-09-13 PROCEDURE — 99233 SBSQ HOSP IP/OBS HIGH 50: CPT | Performed by: INTERNAL MEDICINE

## 2020-09-13 PROCEDURE — 80202 ASSAY OF VANCOMYCIN: CPT

## 2020-09-13 RX ORDER — ALPRAZOLAM 0.5 MG/1
0.5 TABLET ORAL 2 TIMES DAILY PRN
Status: DISCONTINUED | OUTPATIENT
Start: 2020-09-13 | End: 2020-09-16 | Stop reason: HOSPADM

## 2020-09-13 RX ORDER — OXYCODONE HYDROCHLORIDE 5 MG/1
5-10 TABLET ORAL
Status: DISCONTINUED | OUTPATIENT
Start: 2020-09-13 | End: 2020-09-16 | Stop reason: HOSPADM

## 2020-09-13 RX ORDER — OXYCODONE HYDROCHLORIDE 10 MG/1
10 TABLET ORAL
Status: DISCONTINUED | OUTPATIENT
Start: 2020-09-13 | End: 2020-09-13

## 2020-09-13 RX ADMIN — VANCOMYCIN HYDROCHLORIDE 1250 MG: 500 INJECTION, POWDER, LYOPHILIZED, FOR SOLUTION INTRAVENOUS at 09:04

## 2020-09-13 RX ADMIN — HYDROMORPHONE HYDROCHLORIDE 0.4 MG: 1 INJECTION, SOLUTION INTRAMUSCULAR; INTRAVENOUS; SUBCUTANEOUS at 00:00

## 2020-09-13 RX ADMIN — MORPHINE SULFATE 4 MG: 4 INJECTION INTRAVENOUS at 22:27

## 2020-09-13 RX ADMIN — SODIUM CHLORIDE 3 G: 900 INJECTION INTRAVENOUS at 12:09

## 2020-09-13 RX ADMIN — FENTANYL CITRATE 50 MCG: 50 INJECTION INTRAMUSCULAR; INTRAVENOUS at 00:10

## 2020-09-13 RX ADMIN — MORPHINE SULFATE 4 MG: 4 INJECTION INTRAVENOUS at 05:36

## 2020-09-13 RX ADMIN — ACETAMINOPHEN 650 MG: 325 TABLET, FILM COATED ORAL at 18:28

## 2020-09-13 RX ADMIN — VANCOMYCIN HYDROCHLORIDE 1250 MG: 500 INJECTION, POWDER, LYOPHILIZED, FOR SOLUTION INTRAVENOUS at 16:28

## 2020-09-13 RX ADMIN — CITALOPRAM HYDROBROMIDE 20 MG: 20 TABLET ORAL at 05:37

## 2020-09-13 RX ADMIN — OXYCODONE HYDROCHLORIDE 5 MG: 5 TABLET ORAL at 16:08

## 2020-09-13 RX ADMIN — DOCUSATE SODIUM 50 MG AND SENNOSIDES 8.6 MG 2 TABLET: 8.6; 5 TABLET, FILM COATED ORAL at 05:37

## 2020-09-13 RX ADMIN — SODIUM CHLORIDE 3 G: 900 INJECTION INTRAVENOUS at 18:25

## 2020-09-13 RX ADMIN — HYDROMORPHONE HYDROCHLORIDE 0.2 MG: 1 INJECTION, SOLUTION INTRAMUSCULAR; INTRAVENOUS; SUBCUTANEOUS at 00:16

## 2020-09-13 RX ADMIN — SODIUM CHLORIDE 3 G: 900 INJECTION INTRAVENOUS at 05:37

## 2020-09-13 RX ADMIN — ALPRAZOLAM 0.5 MG: 0.5 TABLET ORAL at 16:08

## 2020-09-13 RX ADMIN — OXYCODONE HYDROCHLORIDE 10 MG: 10 TABLET ORAL at 12:09

## 2020-09-13 RX ADMIN — OXYCODONE 5 MG: 5 TABLET ORAL at 08:59

## 2020-09-13 RX ADMIN — MIDAZOLAM HYDROCHLORIDE 1 MG: 1 INJECTION, SOLUTION INTRAMUSCULAR; INTRAVENOUS at 00:02

## 2020-09-13 RX ADMIN — HALOPERIDOL LACTATE 1 MG: 5 INJECTION, SOLUTION INTRAMUSCULAR at 00:19

## 2020-09-13 RX ADMIN — DOCUSATE SODIUM 50 MG AND SENNOSIDES 8.6 MG 2 TABLET: 8.6; 5 TABLET, FILM COATED ORAL at 18:25

## 2020-09-13 ASSESSMENT — PAIN DESCRIPTION - PAIN TYPE
TYPE: ACUTE PAIN
TYPE: SURGICAL PAIN
TYPE: ACUTE PAIN
TYPE: SURGICAL PAIN

## 2020-09-13 ASSESSMENT — ENCOUNTER SYMPTOMS
COUGH: 0
NAUSEA: 0
SHORTNESS OF BREATH: 0
NERVOUS/ANXIOUS: 0
ABDOMINAL PAIN: 0
BLOOD IN STOOL: 0
TREMORS: 0
LOSS OF CONSCIOUSNESS: 0
HEADACHES: 0
FALLS: 0
FOCAL WEAKNESS: 0
CONSTIPATION: 0
MYALGIAS: 1
DIARRHEA: 0
DIZZINESS: 0
SEIZURES: 0
INSOMNIA: 0
EYE REDNESS: 0
DIAPHORESIS: 1
CHILLS: 1
VOMITING: 0
FEVER: 0
WEAKNESS: 0
WHEEZING: 0
EYE PAIN: 0
HEMOPTYSIS: 0
PALPITATIONS: 0

## 2020-09-13 NOTE — PROGRESS NOTES
29yoM with hx of IVDU and right leg fluid collection consistent with abscess.  Transferred from [facility] for surgery due to OR there undergoing maintenance and not currently operational.  Dr. Underwood evaluated patient at [facility].  Admitted to hospitalist service.     S:  Pain in right leg    O:    Vitals:    09/12/20 2210   BP:    Pulse: 88   Resp: 20   Temp: 37.8 °C (100.1 °F)   SpO2: 99%     Exam:  General-NAD, alert and following commands  RLE-moderate swelling and induration proximal medial leg, no obvious open wounds present, flexing/extending toes, dorsi/plantarflexing foot slightly    A: 29yoM with hx of IVDU and right leg fluid collection seen on CT consistent with abscess.  Transferred from [facility] for surgery due to OR there undergoing maintenance and not currently operational.    Plan:  --I discussed with patient recommendation for incision and drainage of his right leg fluid collection in the OR.  He expressed understanding and wishes to proceed  --NPO planning surgery tonight.

## 2020-09-13 NOTE — PROGRESS NOTES
"   Orthopaedic Progress Note    Interval changes:  Patient doing well post op  Pain control issues howard increased to 10mg po Q3  UDAB ordred   JANEY in place with 60 output over last 24 hours    ROS - Patient denies any new issues except per above.       /55   Pulse 64   Temp 36.2 °C (97.1 °F) (Temporal)   Resp 16   Ht 1.778 m (5' 10\")   Wt 66.5 kg (146 lb 9.7 oz)   SpO2 97%       Patient seen and examined  No acute distress  Breathing non labored  RRR  RLE dressings CDI, DNVI, JANEY in place with serosanguinous exudate, moves all toes, cap refill <2 sec.     Recent Labs     09/10/20  1530 09/12/20  0503 09/13/20  0501   WBC 9.3 16.3* 19.5*   RBC 4.28* 4.53* 4.59*   HEMOGLOBIN 11.9* 12.4* 13.0*   HEMATOCRIT 36.1* 37.7* 39.0*   MCV 84.3 83.2 85.0   MCH 27.8 27.4 28.3   MCHC 33.0* 32.9* 33.3*   RDW 36.5 37.4 37.9   PLATELETCT 172 188 206   MPV 9.4 9.7 9.9       Active Hospital Problems    Diagnosis   • Cellulitis of right leg, IVDU to that area [L03.115]     Priority: High   • History of substance abuse (HCC) [F19.11]     Priority: Medium   • Hyperglycemia [R73.9]   • Shoulder abscess [L02.419]   • Anxiety [F41.9]       Assessment/Plan:  Patient doing well post op  POD#1 S/P Incision and drainage of complex right leg abscess deep to fascia.  Wt bearing status - WBAT  Wound care/Drains - dressing left in place  Future Procedures - none planned   Lovenox: Start 9/14, Duration-until ambulatory > 150'  Sutures/Staples out- 10-14 days post operatively  PT/OT-initiated  Antibiotics: vanco 1250mg IV Q8, unasyn 3g IV Q6  DVT Prophylaxis- TEDS/SCDs/Foot pumps  Riddle-none  Case Coordination for Discharge Planning - Disposition pending abx needs     "

## 2020-09-13 NOTE — PROGRESS NOTES
"Pt appears to be starting to have withdraws from Heroine. Pt states upon admission at [facility] ER suboxon 12mg was stopped. Pt 7/10 for RLE pain, leg elevated, Ice packs applied. Pt increase sweating, 116/51-81-95% on 2.5 liters via NC.  Dr Amrita brewer text and updated MD on pt symptoms.  New order for Xanax 0.5mg.  PRN's given, Pulse ox in place and monitoring.  Pt states he \"useally takes 3mg of Xanax and then shoots up with heroine abd able to communicate clearly.\"    Gave emotional support and allowed pt to express needs.   "

## 2020-09-13 NOTE — ASSESSMENT & PLAN NOTE
Obtain A1c  May need insulin for blood sugar control (ideally 140-180) even if prediabetic. Await A1c.

## 2020-09-13 NOTE — ANESTHESIA POSTPROCEDURE EVALUATION
Patient: Luther Forrester    Procedure Summary     Date: 09/12/20 Room / Location: [place] [address] / SURGERY [facility]    Anesthesia Start: 2247 Anesthesia Stop: 2340    Procedure: IRRIGATION AND DEBRIDEMENT, WOUND (Right Leg Lower) Diagnosis: (Right leg abscess)    Surgeon: Brian Andrea M.D. Responsible Provider: Suleiman Pelletier M.D.    Anesthesia Type: general ASA Status: 2 - Emergent          Final Anesthesia Type: general  Last vitals  BP   Blood Pressure: 115/54, NIBP: 132/66    Temp   36.7 °C (98.1 °F)    Pulse   Pulse: 67   Resp   17    SpO2   100 %      Anesthesia Post Evaluation    Patient location during evaluation: PACU  Patient participation: complete - patient participated  Level of consciousness: awake and alert  Pain score: 5    Airway patency: patent  Anesthetic complications: no  Cardiovascular status: hemodynamically stable  Respiratory status: acceptable  Hydration status: euvolemic    PONV: none           Nurse Pain Score: 8 (NPRS)

## 2020-09-13 NOTE — OP REPORT
DATE OF SERVICE:  09/12/2020    PREOPERATIVE DIAGNOSIS:  Right leg abscess.    POSTOPERATIVE DIAGNOSIS:  Right leg abscess.    PROCEDURE PERFORMED:  Incision and drainage of complex right leg abscess deep   to fascia.    SURGEON:  Brian Andrea MD    ANESTHESIOLOGIST:  Suleiman Pelletier MD    ANESTHESIA TYPE:  General.    DRAINS:  A 15-Icelandic JANEY drain between the interval of the gastrocnemius and   soleus muscles was placed.      CULTURES:  Fluid and tissue as well as the wound swab was sent to lab for   aerobic and anaerobic cultures.    INDICATIONS FOR PROCEDURE:  Patient is a 29-year-old male.  He has a history   of IV drug use and presented to [facility]   earlier today with right leg pain and fluid collection concerning for abscess.    He was admitted to the hospitalist service, but was subsequently transferred   to [facility] as operating room was unavailable down there.  My colleague, Dr. Underwood, evaluated him initially and asked if I would be available for   definitive surgical management, which I was happy to do.  I met the patient in   the preop holding area.  He was complaining of quite a bit of pain in the   right leg consistent with abscess which correlated with fluid collection seen   on his CT scan and I recommended surgical incision and drainage.  He signed   informed consent preoperatively and wished to proceed with surgery as outlined   above.    DESCRIPTION OF PROCEDURE:  Patient was met in the preop holding area and his   surgical site was signed.  His consent was confirmed to be accurate.  He was   taken back to the operating room and general anesthesia was induced.  About a   3-4 cm incision was made centered over his medial proximal leg over the   gastrocnemius muscle.  Dissection was carried through the subcutaneous tissue   and there was subcutaneous abscess identified.  Fluid was collected and sent   to the lab for aerobic and anaerobic cultures.  I exposed  some of the muscle.    There was a membrane over the muscle which initially made me feel that there   might be some necrotic muscle, so I extended the incisions proximally and   distally to a total of about 10-12 cm in length to further evaluate the   viability of the superficial posterior muscle compartment.  There was abscess   extending down through the plane between the gastrocnemius and the soleus   muscles.  Ultimately when I debrided this membrane with a rongeur, it was more   consistent with abscess with organized phlegmon/membrane. I did send some of   it to the lab for aerobic and anaerobic cultures.  Once I had sufficiently debrided the   membranous tissue and the phlegmon and thoroughly irrigated all of the gross pus   from the leg, I  once again thoroughly irrigated the wound with Pulsavac using normal   saline.  There was no obvious residual infected tissue or fluid.  I felt that it was   amenable to closure over a drain.  I placed a 15-Sri Lankan JANEY drain within the   interval between the gastrocnemius and the soleus as well as along the   subcutaneous plane at the medial leg and then loosely reapproximated the subQ   layers with 2-0 PDS suture and the skin edges with 2-0 nylon.  I sutured the   drain into place and applied a sterile compressive dressing.  He was then   awoken from anesthesia, transferred on the [facility] and taken to the   postanesthesia care unit in stable condition.    PLAN:  1.  Patient will be readmitted to the medical service postop.  2.  Recommend continuing the JANEY drain for now as well as the compressive   dressing.  3.  I recommend continuing empiric antibiotic therapy and following up   intraoperative cultures and adjusting antibiotics accordingly.  4.  He can weightbear as tolerated to the right lower extremity.       ____________________________________     MD RADHA Bradley / TRACEY    DD:  09/12/2020 23:39:07  DT:  09/13/2020 00:31:39    D#:  [id number]  Job#:  [id number]

## 2020-09-13 NOTE — PROGRESS NOTES
"Pharmacy Kinetics 29 y.o. male on vancomycin day # 2 9/12/2020    Currently on Vancomycin 1250 mg iv q8hr (0500, 1300, 2100)  Provider specified end date: n/a    Indication for Treatment: cellulitis of RLE    Pertinent history per medical record: Admitted on 9/12/2020 as a transfer from [facility] for R calf pain, redness, and swelling. PMH of IVDA. Pt was recently admitted for R shoulder abscess, discharged 9/3 on PO clindamycin. Pt presented afternoon of 9/10 for R calf pain and left AMA prior to antibiotic treatment. Pt returned to  ER early morning of 9/11 for same complaint, started on IV vancomycin and Unasyn. Vanco trough from today at 0645 of 8.3 mcg/mL (prior to fourth dose). Pt then transferred to [facility].    Other antibiotics: Unasyn 3 g q6h    Allergies: Patient has no known allergies.     List concerns for renal function: none    Pertinent cultures to date:   9/10: blood x2: NGTD  9/3: wound (right arm): 1 of 2 tubes: S. viridians      Recent Labs     09/10/20  1530 09/12/20  0503   WBC 9.3 16.3*   NEUTSPOLYS 71.60 83.90*     Recent Labs     09/10/20  1530 09/12/20  0503   BUN 18 13   CREATININE 0.95 0.71   ALBUMIN 4.1  --      Recent Labs     09/12/20  0503   VANCOTROUGH 8.3*   No intake or output data in the 24 hours ending 09/12/20 1949   Ht 1.778 m (5' 10\")   Wt 66.5 kg (146 lb 9.7 oz)  No data recorded.      A/P   1. Vancomycin dose change: 1250 mg q8h  2. Next vancomycin level: next 24-48 hours (not ordered)  3. Goal trough: 10-15 mcg/ml  4. Comments: vancomycin and Unasyn being continued for RLE cellulitis in setting of IVDA. Pt was on vanco 1000 mg q8h, trough prior to fourth dose came back subtherapeutic at 8.3 mcg/mL. Dose was originally increased to 1500 mg (~22.5 mg/kg) q8h at , will instead go with 1250 mg q8h to stay under 20 mg/kg per dose. Pharmacy will continue to follow and adjust dose accordingly.    Rony Paez, PharmD  "

## 2020-09-13 NOTE — OR NURSING
Pt is AOx4. Pain is tolerable after pain meds given per MAR. Pt up and eating crackers with milk.  VSS. Pt on 1 L NC of O2. Pt has sutures, xeroform, 4x4 gauze, abd, kerlix, soft roll, and ace wrap on RLE, CDI. JANEY drain on RLE with serosanguinous drainage. Called and left voice message for pt's mom, Aurora on pt's status and that he is going back to his room on T326-2. Called and gave report to DINORAH Patel. All questions answered, no other needs at this time.

## 2020-09-13 NOTE — CARE PLAN
Problem: Communication  Goal: The ability to communicate needs accurately and effectively will improve  Outcome: PROGRESSING AS EXPECTED  Note: Pt able to make needs known, uses call light appropriately.      Problem: Safety  Goal: Will remain free from injury  Outcome: PROGRESSING AS EXPECTED  Note: Pt remains free from injury, Floor clear from clutter and cords.  Pt A+OX4, Non-Skid socks, Bed/chair alarm off. Proper signs outside pt door in place. Door remains open.  Pt uses call light appropriately. Call light with in reach of pt.  Hourly rounding in place.   Goal: Will remain free from falls  Outcome: PROGRESSING AS EXPECTED     Problem: Infection  Goal: Will remain free from infection  Outcome: PROGRESSING AS EXPECTED  Note: Pt remains a fibril.     Problem: Pain Management  Goal: Pain level will decrease to patient's comfort goal  Outcome: PROGRESSING AS EXPECTED  Flowsheets  Taken 9/13/2020 0949  Non Verbal Scale:   Calm   Unlabored Breathing  Taken 9/13/2020 0912  Pain Rating Scale (NPRS): 7  Note: PRN pain medication given to help surgical pain. Ice pack applied, elevation encouraged.  CSMT WNL. Dressing CDI, JANEY drain drainage patent with Serosanguinous fluids.

## 2020-09-13 NOTE — PROGRESS NOTES
Two RN skin check complete with Amanda RN  Devices in place: None  Skin Assessed under devices: N/A    Pt has small healing abscess on right shoulder. No other areas of skin breakdown noted. Blankets and extra pillows given for comfort and repositioning. Pt repositions independently.

## 2020-09-13 NOTE — PROGRESS NOTES
[facility] Medicine Daily Progress Note    Date of Service  9/13/2020    Chief Complaint  29 y.o. male on 9/12/2020 as a transfer from [facility] for R leg cellulitis (see Dr. Hernandez's HnP and d/c summary) for Orthopedic intervenction.    Hospital Course      History of IV heroin use.  Recent R shoulder abscess s/p drainage in ER, discharged on PO abx.  Presented with R calf pain redness and swelling.  Was seen at [facility] and found to have right lower extremity cellulitis. Ultrasound negative for DVT.  Initially, vital signs and white count were normal however he appeared to be in excruciating pain. Started on Unasyn and vancomycin.  A CT scan of the lower extremity was performed and revealed extensive edema in the right calf, no free air was seen however it was suggestive of myositis versus fasciitis.  There leukocytosis trended upwards and pain worse. Due to slower than expected clinical improvement,Orthopedic surgery consulted  necrotizing fasciitis versus compartment syndrome.  They examined the patient and reviewed his imaging and ultimately determined he would likely benefit from a washout of the lower leg.  Patient was made n.p.o.  Second MD OR presence was requested by orthopedic surgery therefore they requested transfer to [facility].  Dr. Santamaria consulting, Upon transfer he remained afrebrile and hemodynamically stable.  He underwent:   Incision and drainage of complex right leg abscess deep   to fascia.  A 15-Khmer JANEY drain between the interval of the gastrocnemius and   soleus muscles was placed.    By Dr. Santamaria on 9/12.  Blood cultures NGTD  Wound cultures PENDING         Interval Problem Update  Anxious. Only after questioning, he did admit to IVDU and actually injecting ot his R leg.    Consultants/Specialty  Orthopedics  ID consultation once we have culture data    Code Status  Full Code    Disposition  Orthopedics to clear  Antibiotic plan to be elucidated  PT/OT  also consulted    Review of Systems  Review of Systems   Constitutional: Positive for chills, diaphoresis and malaise/fatigue. Negative for fever.   HENT: Negative for congestion, hearing loss and nosebleeds.    Eyes: Negative for pain and redness.   Respiratory: Negative for cough, hemoptysis, shortness of breath and wheezing.    Cardiovascular: Negative for chest pain and palpitations.   Gastrointestinal: Negative for abdominal pain, blood in stool, constipation, diarrhea, nausea and vomiting.   Genitourinary: Negative for dysuria, frequency and hematuria.   Musculoskeletal: Positive for myalgias. Negative for falls and joint pain.   Skin: Positive for rash.   Neurological: Negative for dizziness, tremors, focal weakness, seizures, loss of consciousness, weakness and headaches.   Psychiatric/Behavioral: The patient is not nervous/anxious and does not have insomnia.    All other systems reviewed and are negative.       Physical Exam  Temp:  [35.8 °C (96.5 °F)-37.8 °C (100.1 °F)] 36.2 °C (97.2 °F)  Pulse:  [57-93] 57  Resp:  [13-20] 16  BP: (114-139)/(54-73) 125/66  SpO2:  [96 %-100 %] 99 %    Physical Exam  Vitals signs and nursing note reviewed.   Constitutional:       Appearance: He is ill-appearing.   HENT:      Head: Normocephalic and atraumatic.      Right Ear: External ear normal.      Left Ear: External ear normal.      Nose: Nose normal.      Mouth/Throat:      Mouth: Mucous membranes are moist.   Eyes:      General: No scleral icterus.     Conjunctiva/sclera: Conjunctivae normal.   Neck:      Musculoskeletal: Normal range of motion and neck supple.   Cardiovascular:      Rate and Rhythm: Normal rate and regular rhythm.      Heart sounds: No murmur. No friction rub. No gallop.    Pulmonary:      Effort: Pulmonary effort is normal.      Breath sounds: Normal breath sounds.   Abdominal:      General: Abdomen is flat. Bowel sounds are normal. There is no distension.      Palpations: Abdomen is soft.       Tenderness: There is no abdominal tenderness. There is no guarding.   Musculoskeletal: Normal range of motion.         General: Tenderness (R leg) present.      Comments: R leg bandages CDI   Skin:     General: Skin is warm.      Findings: Erythema (R leg) and rash (R leg) present.   Neurological:      Mental Status: He is alert and oriented to person, place, and time. Mental status is at baseline.   Psychiatric:         Mood and Affect: Mood normal.         Behavior: Behavior normal.         Thought Content: Thought content normal.         Judgment: Judgment normal.      Comments: Anxious         Fluids    Intake/Output Summary (Last 24 hours) at 9/13/2020 0932  Last data filed at 9/13/2020 0914  Gross per 24 hour   Intake 1240 ml   Output 2080 ml   Net -840 ml       Laboratory  Recent Labs     09/10/20  1530 09/12/20  0503 09/13/20  0501   WBC 9.3 16.3* 19.5*   RBC 4.28* 4.53* 4.59*   HEMOGLOBIN 11.9* 12.4* 13.0*   HEMATOCRIT 36.1* 37.7* 39.0*   MCV 84.3 83.2 85.0   MCH 27.8 27.4 28.3   MCHC 33.0* 32.9* 33.3*   RDW 36.5 37.4 37.9   PLATELETCT 172 188 206   MPV 9.4 9.7 9.9     Recent Labs     09/10/20  1530 09/12/20  0503 09/13/20  0501   SODIUM 139 134* 137   POTASSIUM 4.6 3.8 4.5   CHLORIDE 101 97 99   CO2 28 25 24   GLUCOSE 89 126* 230*   BUN 18 13 15   CREATININE 0.95 0.71 0.75   CALCIUM 8.9 8.8 9.2     Recent Labs     09/10/20  1530   APTT 27.8   INR 0.91               Imaging  No orders to display        Assessment/Plan  * Cellulitis of right leg, IVDU to that area- (present on admission)  Assessment & Plan  S/p:   Incision and drainage of complex right leg abscess deep   to fascia.  A 15-Wolof JANEY drain between the interval of the gastrocnemius and   soleus muscles was placed.    By Dr. Santamaria on 9/12.  Wound cultures pending  ID consult once we have results  PT/OT ordered    History of substance abuse (HCC)- (present on admission)  Assessment & Plan  Heroin  Supportive management for withdrawal symptoms  and anxiety   for resources for substance dependence management    Hyperglycemia  Assessment & Plan  Obtain A1c  May need insulin for blood sugar control (ideally 140-180) even if prediabetic. Await A1c.    Shoulder abscess- (present on admission)  Assessment & Plan  Noted    Anxiety- (present on admission)  Assessment & Plan  PRN Xanax       VTE prophylaxis: Started pharmacologic DVT prophylaxis if ok with Ortho  Gastrointestinal prophylaxis: None  Antibiotics:   Ordered Anti-infectives (9999h ago, onward)     Ordered     Start    09/12/20 1836  vancomycin (VANCOCIN) 1,000 mg in  mL IVPB  EVERY 8 HOURS,   Status:  Discontinued      09/12/20 2100 09/12/20 1903  vancomycin (VANCOCIN) 1,500 mg in  mL IVPB  EVERY 8 HOURS,   Status:  Discontinued      09/12/20 2100 09/12/20 1931  vancomycin (VANCOCIN) 1,250 mg in  mL IVPB  EVERY 8 HOURS      09/12/20 2100 09/12/20 1836  ampicillin/sulbactam (UNASYN) 3 g in  mL IVPB  EVERY 6 HOURS      09/12/20 1900 09/12/20 1836  MD Alert...Vancomycin per Pharmacy  PHARMACY TO DOSE      09/12/20 1836              Diet:   Orders Placed This Encounter   Procedures   • Diet Order Regular     Standing Status:   Standing     Number of Occurrences:   1     Order Specific Question:   Diet:     Answer:   Regular [1]      Prognosis: Guarded  Risk: The Patient is at HIGH risk for inpatient complications and decompensation secondary to his multiple cormorbidities  listed above, especially   Problem   Cellulitis of right leg, IVDU to that area   History of substance abuse (HCC)   Hyperglycemia   Shoulder Abscess   Anxiety      I have personally reviewed notes, labs, vitals, imaging.  I discussed the plan of care with bedside RN as well as on multidisciplinary rounds  I have performed a physical exam and reviewed and updated ROS and Plan today 9/13/2020. In review of yesterday's note 9/12/2020   there are no changes except as documented above.

## 2020-09-13 NOTE — ASSESSMENT & PLAN NOTE
Status post irrigation and debridement per orthopedics team this hospitalization    Continue IV Unasyn and vancomycin  Monitor for vancomycin toxicity and therapeutics  I consulted with infectious disease team, case discussed with Dr. Rocha for guidance with respect to antibiotic therapy, she will evaluate the patient    Continue ongoing postoperative care per surgical team, Dr. Andrea  Drain to remain in place per Dr. Andrea, discussed with Dr. Andrea from orthopedics  Further surgical interventions per orthopedics team    Continue pain control    If transition to oral antibiotic therapy and cleared from orthopedics perspective, anticipating discharge home soon

## 2020-09-13 NOTE — ANESTHESIA PROCEDURE NOTES
Airway    Date/Time: 9/12/2020 10:52 PM  Performed by: Suleiman Pelletier M.D.  Authorized by: Suleiman Pelletier M.D.     Location:  OR  Urgency:  Elective  Difficult Airway: No    Indications for Airway Management:  Anesthesia      Spontaneous Ventilation: absent    Sedation Level:  Deep  Preoxygenated: Yes    Final Airway Type:  Supraglottic airway  Final Supraglottic Airway:  Standard LMA    SGA Size:  4  Number of Attempts at Approach:  1

## 2020-09-13 NOTE — ANESTHESIA QCDR
2019 [facility] Clinical Data Registry (for Quality Improvement)     Postoperative nausea/vomiting risk protocol (Adult = 18 yrs and Pediatric 3-17 yrs)- (430 and 463)  General inhalation anesthetic (NOT TIVA) with PONV risk factors: Yes  Provision of anti-emetic therapy with at least 2 different classes of agents: Yes   Patient DID NOT receive anti-emetic therapy and reason is documented in Medical Record:  N/A    Multimodal Pain Management- (477)  Non-emergent surgery AND patient age >= 18: No  Use of Multimodal Pain Management, two or more drugs and/or interventions, NOT including systemic opioids:   Exception: Documented allergy to multiple classes of analgesics:     Smoking Abstinence (404)  Patient is current smoker (cigarette, pipe, e-cig, marijuanna): No  Elective Surgery:   Abstinence instructions provided prior to day of surgery:   Patient abstained from smoking on day of surgery:     Pre-Op Beta-Blocker in Isolated CABG (44)  Isolated CABG AND patient age >= 18: No  Beta-blocker admin within 24 hours of surgical incision:   Exception:of medical reason(s) for not administering beta blocker within 24 hours prior to surgical incision (e.g., not  indicated,other medical reason):     PACU assessment of acute postoperative pain prior to Anesthesia Care End- Applies to Patients Age = 18- (ABG7)  Initial PACU pain score is which of the following: < 7/10  Patient unable to report pain score: N/A    Post-anesthetic transfer of care checklist/protocol to PACU/ICU- (426 and 427)  Upon conclusion of case, patient transferred to which of the following locations: PACU/Non-ICU  Use of transfer checklist/protocol: Yes  Exclusion: Service Performed in Patient Hospital Room (and thus did not require transfer): N/A  Unplanned admission to ICU related to anesthesia service up through end of PACU care- (MD51)  Unplanned admission to ICU (not initially anticipated at anesthesia start time): No

## 2020-09-13 NOTE — ANESTHESIA PREPROCEDURE EVALUATION
Relevant Problems   NEURO   (+) History of substance abuse (HCC)       Physical Exam    Airway   Mallampati: II  TM distance: >3 FB  Neck ROM: full       Cardiovascular - normal exam  Rhythm: regular  Rate: normal  (-) murmur     Dental - normal exam           Pulmonary - normal exam  Breath sounds clear to auscultation     Abdominal    Neurological - normal exam                 Anesthesia Plan    ASA 2- EMERGENT   ASA physical status emergent criteria: acutely contaminated wound or identified infection source    Plan - general       Airway plan will be LMA        Induction: intravenous    Postoperative Plan: Postoperative administration of opioids is intended.    Pertinent diagnostic labs and testing reviewed    Informed Consent:    Anesthetic plan and risks discussed with patient.    Use of blood products discussed with: patient whom consented to blood products.

## 2020-09-13 NOTE — ANESTHESIA TIME REPORT
Anesthesia Start and Stop Event Times     Date Time Event    9/12/2020 2238 Ready for Procedure     2247 Anesthesia Start     2340 Anesthesia Stop        Responsible Staff  09/12/20    Name Role Begin End    Suleiman Pelletier M.D. Anesth 2247 2340        Preop Diagnosis (Free Text):  Pre-op Diagnosis     Right leg abscess        Preop Diagnosis (Codes):    Post op Diagnosis  Infected calf abrasion, right, initial encounter      Premium Reason  E. Weekend    Comments:

## 2020-09-13 NOTE — PROGRESS NOTES
Pt arrived to floor via EMS @ [id number]. Paged Dr. Andrea through [facility] Orth[facility] at [phone].

## 2020-09-13 NOTE — PROGRESS NOTES
"Pharmacy Kinetics 29 y.o. male on vancomycin day # 3 9/13/2020    Currently on Vancomycin 1250 mg iv q8hr (0100, 0900, 1700)  Provider specified end date: n/a     Indication for Treatment: cellulitis of RLE    Pertinent history per medical record: Admitted on 9/12/2020 for Rt leg cellulitis. 28 y/o M w/ hx of IVDU and recent R shoulder abscess s/p drainage in ER, discharged on PO abx, admitted with R calf pain redness and swelling found to have right lower extremity cellulitis. He was started on Unasyn and vancomycin.  A CT scan of the lower extremity was performed and revealed extensive edema in the right calf, no free air was seen however it was suggestive of myositis versus fasciitis.  Due to slower than expected clinical improvement, I consulted orthopedic surgery to evaluate for necrotizing fasciitis versus compartment syndrome.  They examined the patient and reviewed his imaging and ultimately determined he would likely benefit from a washout of the lower leg.       Other antibiotics: Unasyn 3g iv q6hr     Allergies: Patient has no known allergies.     List concerns for renal function: None     Pertinent cultures to date:   09/03/20: Rt arm,WND: Viridans Streptococcus   09/10/20:PBCx2:NGTD  09/12/20:Rt Lg Abscess,WND: NGTD    MRSA nares swab if pneumonia is a concern (ordered/positive/negative/n-a): NA    Recent Labs     09/10/20  1530 09/12/20  0503 09/13/20  0501   WBC 9.3 16.3* 19.5*   NEUTSPOLYS 71.60 83.90* 93.00*     Recent Labs     09/10/20  1530 09/12/20  0503 09/13/20  0501   BUN 18 13 15   CREATININE 0.95 0.71 0.75   ALBUMIN 4.1  --  3.6     Recent Labs     09/12/20  0503 09/13/20  0501   VANCOTROUGH 8.3* 14.0       Intake/Output Summary (Last 24 hours) at 9/13/2020 1008  Last data filed at 9/13/2020 0914  Gross per 24 hour   Intake 1240 ml   Output 2080 ml   Net -840 ml      /66   Pulse (!) 57   Temp 36.2 °C (97.2 °F) (Temporal)   Resp 16   Ht 1.778 m (5' 10\")   Wt 66.5 kg (146 lb 9.7 oz) "   SpO2 99%  Temp (24hrs), Av.6 °C (97.9 °F), Min:35.8 °C (96.5 °F), Max:37.8 °C (100.1 °F)      A/P   1. Vancomycin dose change: No change   2. Next vancomycin level: ~2days   3. Goal trough: 10-15 mcg/mL   4. Comments: Leukocytosis, tmax 100.1, cx Rt arm,WND: Viridans Streptococcus. S/p I&D. Renal indices decreased over interval. Vancomycin level this AM was ~ 8 hr after last dose, subsequent dose was delayed and rescheduled. Consider vancomycin de escalation if MRSA risk can be ruled out.     Deejay BrownD BCPS

## 2020-09-13 NOTE — ASSESSMENT & PLAN NOTE
Heroin  Supportive management for withdrawal symptoms and anxiety  SW for resources for substance dependence management

## 2020-09-13 NOTE — OR SURGEON
Immediate Post OP Note    PreOp Diagnosis: Right leg abscess    PostOp Diagnosis: same    Procedure(s):  IRRIGATION AND DEBRIDEMENT, WOUND - Wound Class: Dirty or Infected    Surgeon(s):  Brian Andrea M.D.    Anesthesiologist/Type of Anesthesia:  Anesthesiologist: Suleiman Pelletier M.D./General    Surgical Staff:  Circulator: Chloe Darby R.N.  Scrub Person: Fidencio Mcdonald    Specimens removed if any:  ID Type Source Tests Collected by Time Destination   1 : Right leg abscess Body Fluid Abscess AEROBIC/ANAEROBIC CULTURE (SURGERY) Brian Andrea M.D. 9/12/2020 11:07 PM    2 : Right leg abscess membrane Tissue Abscess AEROBIC/ANAEROBIC CULTURE (SURGERY) Brian Andrea M.D. 9/12/2020 11:12 PM        Estimated Blood Loss: 50cc    Findings: see dictation    Complications: none known    PLAN:  --readmit medicine postop  --continue JANEY drain  --continue compressive dressing  --continue abx, fu intraop cultures  --WBAT RLE        9/12/2020 11:33 PM Brian Andrea M.D.

## 2020-09-14 PROBLEM — L02.413 ABSCESS OF RIGHT SHOULDER: Status: ACTIVE | Noted: 2020-09-12

## 2020-09-14 LAB
ALBUMIN SERPL BCP-MCNC: 3.5 G/DL (ref 3.2–4.9)
BUN SERPL-MCNC: 17 MG/DL (ref 8–22)
CALCIUM SERPL-MCNC: 8.8 MG/DL (ref 8.5–10.5)
CHLORIDE SERPL-SCNC: 102 MMOL/L (ref 96–112)
CO2 SERPL-SCNC: 26 MMOL/L (ref 20–33)
CREAT SERPL-MCNC: 0.7 MG/DL (ref 0.5–1.4)
ERYTHROCYTE [DISTWIDTH] IN BLOOD BY AUTOMATED COUNT: 38.1 FL (ref 35.9–50)
GLUCOSE SERPL-MCNC: 107 MG/DL (ref 65–99)
HCT VFR BLD AUTO: 34.3 % (ref 42–52)
HGB BLD-MCNC: 11.5 G/DL (ref 14–18)
MCH RBC QN AUTO: 28.4 PG (ref 27–33)
MCHC RBC AUTO-ENTMCNC: 33.5 G/DL (ref 33.7–35.3)
MCV RBC AUTO: 84.7 FL (ref 81.4–97.8)
PHOSPHATE SERPL-MCNC: 2.3 MG/DL (ref 2.5–4.5)
PLATELET # BLD AUTO: 225 K/UL (ref 164–446)
PMV BLD AUTO: 9.8 FL (ref 9–12.9)
POTASSIUM SERPL-SCNC: 4.2 MMOL/L (ref 3.6–5.5)
RBC # BLD AUTO: 4.05 M/UL (ref 4.7–6.1)
SODIUM SERPL-SCNC: 139 MMOL/L (ref 135–145)
WBC # BLD AUTO: 14.7 K/UL (ref 4.8–10.8)

## 2020-09-14 PROCEDURE — 85027 COMPLETE CBC AUTOMATED: CPT

## 2020-09-14 PROCEDURE — A9270 NON-COVERED ITEM OR SERVICE: HCPCS | Performed by: INTERNAL MEDICINE

## 2020-09-14 PROCEDURE — A9270 NON-COVERED ITEM OR SERVICE: HCPCS | Performed by: PHYSICIAN ASSISTANT

## 2020-09-14 PROCEDURE — 700105 HCHG RX REV CODE 258: Performed by: INTERNAL MEDICINE

## 2020-09-14 PROCEDURE — 80069 RENAL FUNCTION PANEL: CPT

## 2020-09-14 PROCEDURE — 700102 HCHG RX REV CODE 250 W/ 637 OVERRIDE(OP): Performed by: PHYSICIAN ASSISTANT

## 2020-09-14 PROCEDURE — 700102 HCHG RX REV CODE 250 W/ 637 OVERRIDE(OP): Performed by: INTERNAL MEDICINE

## 2020-09-14 PROCEDURE — 770001 HCHG ROOM/CARE - MED/SURG/GYN PRIV*

## 2020-09-14 PROCEDURE — 36415 COLL VENOUS BLD VENIPUNCTURE: CPT

## 2020-09-14 PROCEDURE — 700111 HCHG RX REV CODE 636 W/ 250 OVERRIDE (IP): Performed by: INTERNAL MEDICINE

## 2020-09-14 PROCEDURE — 700105 HCHG RX REV CODE 258

## 2020-09-14 PROCEDURE — 97161 PT EVAL LOW COMPLEX 20 MIN: CPT

## 2020-09-14 PROCEDURE — 99232 SBSQ HOSP IP/OBS MODERATE 35: CPT | Performed by: INTERNAL MEDICINE

## 2020-09-14 RX ORDER — SODIUM CHLORIDE 9 MG/ML
INJECTION, SOLUTION INTRAVENOUS
Status: COMPLETED
Start: 2020-09-14 | End: 2020-09-14

## 2020-09-14 RX ADMIN — SODIUM CHLORIDE 3 G: 900 INJECTION INTRAVENOUS at 05:40

## 2020-09-14 RX ADMIN — MORPHINE SULFATE 4 MG: 4 INJECTION INTRAVENOUS at 04:00

## 2020-09-14 RX ADMIN — SODIUM CHLORIDE 3 G: 900 INJECTION INTRAVENOUS at 12:33

## 2020-09-14 RX ADMIN — ALPRAZOLAM 0.5 MG: 0.5 TABLET ORAL at 02:17

## 2020-09-14 RX ADMIN — VANCOMYCIN HYDROCHLORIDE 1250 MG: 500 INJECTION, POWDER, LYOPHILIZED, FOR SOLUTION INTRAVENOUS at 18:11

## 2020-09-14 RX ADMIN — VANCOMYCIN HYDROCHLORIDE 1250 MG: 500 INJECTION, POWDER, LYOPHILIZED, FOR SOLUTION INTRAVENOUS at 02:00

## 2020-09-14 RX ADMIN — ENOXAPARIN SODIUM 40 MG: 40 INJECTION SUBCUTANEOUS at 05:43

## 2020-09-14 RX ADMIN — OXYCODONE HYDROCHLORIDE 10 MG: 5 TABLET ORAL at 09:29

## 2020-09-14 RX ADMIN — SODIUM CHLORIDE 3 G: 900 INJECTION INTRAVENOUS at 20:40

## 2020-09-14 RX ADMIN — OXYCODONE HYDROCHLORIDE 10 MG: 5 TABLET ORAL at 19:48

## 2020-09-14 RX ADMIN — SODIUM CHLORIDE 500 ML: 9 INJECTION, SOLUTION INTRAVENOUS at 03:46

## 2020-09-14 RX ADMIN — ALPRAZOLAM 0.5 MG: 0.5 TABLET ORAL at 21:10

## 2020-09-14 RX ADMIN — OXYCODONE HYDROCHLORIDE 10 MG: 5 TABLET ORAL at 15:10

## 2020-09-14 RX ADMIN — OXYCODONE HYDROCHLORIDE 10 MG: 5 TABLET ORAL at 00:54

## 2020-09-14 RX ADMIN — VANCOMYCIN HYDROCHLORIDE 1250 MG: 500 INJECTION, POWDER, LYOPHILIZED, FOR SOLUTION INTRAVENOUS at 09:24

## 2020-09-14 RX ADMIN — SODIUM CHLORIDE 3 G: 900 INJECTION INTRAVENOUS at 00:54

## 2020-09-14 RX ADMIN — CITALOPRAM HYDROBROMIDE 20 MG: 20 TABLET ORAL at 05:43

## 2020-09-14 ASSESSMENT — ENCOUNTER SYMPTOMS
FOCAL WEAKNESS: 0
COUGH: 0
CHILLS: 1
MYALGIAS: 1
SHORTNESS OF BREATH: 0
NERVOUS/ANXIOUS: 0
WEAKNESS: 0
FALLS: 0
EYE PAIN: 0
VOMITING: 0
PALPITATIONS: 0
CONSTIPATION: 0
DIAPHORESIS: 1
HEMOPTYSIS: 0
DIZZINESS: 0
SEIZURES: 0
WHEEZING: 0
BLOOD IN STOOL: 0
FEVER: 0
EYE REDNESS: 0
TREMORS: 0
DIARRHEA: 0
INSOMNIA: 0
LOSS OF CONSCIOUSNESS: 0
NAUSEA: 0
ABDOMINAL PAIN: 0
HEADACHES: 0

## 2020-09-14 ASSESSMENT — COGNITIVE AND FUNCTIONAL STATUS - GENERAL
MOBILITY SCORE: 23
SUGGESTED CMS G CODE MODIFIER MOBILITY: CI
CLIMB 3 TO 5 STEPS WITH RAILING: A LITTLE

## 2020-09-14 ASSESSMENT — GAIT ASSESSMENTS
DEVIATION: ANTALGIC
ASSISTIVE DEVICE: FRONT WHEEL WALKER
DISTANCE (FEET): 20
GAIT LEVEL OF ASSIST: SUPERVISED

## 2020-09-14 ASSESSMENT — PAIN DESCRIPTION - PAIN TYPE
TYPE: SURGICAL PAIN

## 2020-09-14 NOTE — PROGRESS NOTES
[facility] Medicine Daily Progress Note    Date of Service  9/14/2020    Chief Complaint  29 y.o. male on 9/12/2020 as a transfer from [facility] for R leg cellulitis (see Dr. Hernandez's HnP and d/c summary) for Orthopedic intervenction.    Hospital Course      History of IV heroin use.  Recent R shoulder abscess s/p drainage in ER, discharged on PO abx.  Presented with R calf pain redness and swelling.  Was seen at [facility] and found to have right lower extremity cellulitis. Ultrasound negative for DVT.  Initially, vital signs and white count were normal however he appeared to be in excruciating pain. Started on Unasyn and vancomycin.  A CT scan of the lower extremity was performed and revealed extensive edema in the right calf, no free air was seen however it was suggestive of myositis versus fasciitis.  There leukocytosis trended upwards and pain worse. Due to slower than expected clinical improvement,Orthopedic surgery consulted  necrotizing fasciitis versus compartment syndrome.  They examined the patient and reviewed his imaging and ultimately determined he would likely benefit from a washout of the lower leg.  Patient was made n.p.o.  Second MD OR presence was requested by orthopedic surgery therefore they requested transfer to [facility].  Dr. Santamaria consulting, Upon transfer he remained afrebrile and hemodynamically stable.  He underwent:   Incision and drainage of complex right leg abscess deep   to fascia.  A 15-Kinyarwanda JANEY drain between the interval of the gastrocnemius and   soleus muscles was placed.    By Dr. Santamaria on 9/12.  Blood cultures NGTD  Wound cultures PENDING         Interval Problem Update  Less anxious today,. Understands plan.  Tissue and operative wound cultures still PENDING    Consultants/Specialty  Orthopedics  ID consultation once we have culture data    Code Status  Full Code    Disposition  Orthopedics to clear. Drain still present  Antibiotic plan to be  elucidated depending on operative cultures  PT/OT also consulted    Review of Systems  Review of Systems   Constitutional: Positive for chills, diaphoresis and malaise/fatigue. Negative for fever.   HENT: Negative for congestion, hearing loss and nosebleeds.    Eyes: Negative for pain and redness.   Respiratory: Negative for cough, hemoptysis, shortness of breath and wheezing.    Cardiovascular: Negative for chest pain and palpitations.   Gastrointestinal: Negative for abdominal pain, blood in stool, constipation, diarrhea, nausea and vomiting.   Genitourinary: Negative for dysuria, frequency and hematuria.   Musculoskeletal: Positive for myalgias. Negative for falls and joint pain.   Skin: Positive for rash.   Neurological: Negative for dizziness, tremors, focal weakness, seizures, loss of consciousness, weakness and headaches.   Psychiatric/Behavioral: The patient is not nervous/anxious and does not have insomnia.    All other systems reviewed and are negative.       Physical Exam  Temp:  [36.2 °C (97.1 °F)-37.1 °C (98.7 °F)] 36.2 °C (97.2 °F)  Pulse:  [61-69] 69  Resp:  [16-18] 18  BP: (115-124)/(44-60) 124/60  SpO2:  [93 %-98 %] 98 %    Physical Exam  Vitals signs and nursing note reviewed.   Constitutional:       Appearance: He is ill-appearing.   HENT:      Head: Normocephalic and atraumatic.      Right Ear: External ear normal.      Left Ear: External ear normal.      Nose: Nose normal.      Mouth/Throat:      Mouth: Mucous membranes are moist.   Eyes:      General: No scleral icterus.     Conjunctiva/sclera: Conjunctivae normal.   Neck:      Musculoskeletal: Normal range of motion and neck supple.   Cardiovascular:      Rate and Rhythm: Normal rate and regular rhythm.      Heart sounds: No murmur. No friction rub. No gallop.    Pulmonary:      Effort: Pulmonary effort is normal.      Breath sounds: Normal breath sounds.   Abdominal:      General: Abdomen is flat. Bowel sounds are normal. There is no  distension.      Palpations: Abdomen is soft.      Tenderness: There is no abdominal tenderness. There is no guarding.   Musculoskeletal: Normal range of motion.         General: Tenderness (R leg) present.      Comments: R leg bandages CDI  Drain in place   Skin:     General: Skin is warm.      Findings: Erythema (R leg) and rash (R leg) present.   Neurological:      Mental Status: He is alert and oriented to person, place, and time. Mental status is at baseline.   Psychiatric:         Mood and Affect: Mood normal.         Behavior: Behavior normal.         Thought Content: Thought content normal.         Judgment: Judgment normal.      Comments: Less anxious         Fluids    Intake/Output Summary (Last 24 hours) at 9/14/2020 0906  Last data filed at 9/14/2020 0400  Gross per 24 hour   Intake 440 ml   Output 1195 ml   Net -755 ml       Laboratory  Recent Labs     09/12/20  0503 09/13/20  0501   WBC 16.3* 19.5*   RBC 4.53* 4.59*   HEMOGLOBIN 12.4* 13.0*   HEMATOCRIT 37.7* 39.0*   MCV 83.2 85.0   MCH 27.4 28.3   MCHC 32.9* 33.3*   RDW 37.4 37.9   PLATELETCT 188 206   MPV 9.7 9.9     Recent Labs     09/12/20  0503 09/13/20  0501   SODIUM 134* 137   POTASSIUM 3.8 4.5   CHLORIDE 97 99   CO2 25 24   GLUCOSE 126* 230*   BUN 13 15   CREATININE 0.71 0.75   CALCIUM 8.8 9.2                   Imaging  No orders to display        Assessment/Plan  * Cellulitis of right leg, IVDU to that area, recent S. viridian abscess of R shoulder- (present on admission)  Assessment & Plan  S/p:   Incision and drainage of complex right leg abscess deep   to fascia.  A 15-Guyanese JANEY drain between the interval of the gastrocnemius and   soleus muscles was placed.    By Dr. Santamaria on 9/12.  Wound cultures pending  ID consult once we have results  PT/OT ordered  9/14, operative wound and tissue cultures still PENDING. Curbside or consult ID once we have results    History of substance abuse (HCC)- (present on admission)  Assessment &  Plan  Heroin  Supportive management for withdrawal symptoms and anxiety   for resources for substance dependence management    Hyperglycemia  Assessment & Plan  Obtain A1c  May need insulin for blood sugar control (ideally 140-180) even if prediabetic. Await A1c.    Anxiety- (present on admission)  Assessment & Plan  PRN Xanax    Abscess of right shoulder- (present on admission)  Assessment & Plan  Noted  S. viridians lana culture       VTE prophylaxis: Started pharmacologic DVT prophylaxis if ok with Ortho  Gastrointestinal prophylaxis: None  Antibiotics:   Ordered Anti-infectives (9999h ago, onward)     Ordered     Start    09/12/20 1836  vancomycin (VANCOCIN) 1,000 mg in  mL IVPB  EVERY 8 HOURS,   Status:  Discontinued      09/12/20 2100 09/12/20 1903  vancomycin (VANCOCIN) 1,500 mg in  mL IVPB  EVERY 8 HOURS,   Status:  Discontinued      09/12/20 2100 09/12/20 1931  vancomycin (VANCOCIN) 1,250 mg in  mL IVPB  EVERY 8 HOURS      09/12/20 2100 09/12/20 1836  ampicillin/sulbactam (UNASYN) 3 g in  mL IVPB  EVERY 6 HOURS      09/12/20 1900 09/12/20 1836  MD Alert...Vancomycin per Pharmacy  PHARMACY TO DOSE      09/12/20 1836              Diet:   Orders Placed This Encounter   Procedures   • Diet Order Regular     Standing Status:   Standing     Number of Occurrences:   1     Order Specific Question:   Diet:     Answer:   Regular [1]      Prognosis: Guarded  Risk: The Patient is at HIGH risk for inpatient complications and decompensation secondary to his multiple cormorbidities  listed above, especially   Problem   Cellulitis of right leg, IVDU to that area, recent S. viridian abscess of R shoulder   History of substance abuse (HCC)   Abscess of Right Shoulder   Hyperglycemia   Anxiety      I have personally reviewed notes, labs, vitals, imaging.  I discussed the plan of care with bedside RN as well as on multidisciplinary rounds  I have performed a physical exam and reviewed and  updated ROS and Plan today 9/14/2020. In review of yesterday's note 9/13/2020   there are no changes except as documented above.

## 2020-09-14 NOTE — PROGRESS NOTES
29yoM with right leg complex abscess s/p I&D 9/12.    S: Leg feels better overall after surgery.    O:    Vitals:    09/14/20 0800   BP: 124/60   Pulse: 69   Resp: 18   Temp: 36.2 °C (97.2 °F)   SpO2: 98%     Exam:  General-NAD, alert and following commands  RLE-leg dressing c/d/i, +EHL/FHL/TA/GS motor, SILT diffusely in foot, palp dp pulse, JANEY drain in place with scant serosanguinous fluid in bulb    JANEY output: 15cc last 24hrs    A: 29yoM with right leg complex abscess s/p I&D 9/12.    Recs:  --WBAT RLE  --continue JANEY drain and compressive dressing for now  --continue abx, fu final intraop cultures

## 2020-09-14 NOTE — CARE PLAN
Problem: Communication  Goal: The ability to communicate needs accurately and effectively will improve  9/14/2020 1023 by Mahi Aguirre R.N.  Outcome: PROGRESSING AS EXPECTED  9/14/2020 1023 by Mahi Aguirre R.N.  Outcome: PROGRESSING AS EXPECTED     Problem: Safety  Goal: Will remain free from injury  9/14/2020 1023 by Mahi Aguirre R.N.  Outcome: PROGRESSING AS EXPECTED  9/14/2020 1023 by Mahi Aguirre R.N.  Outcome: PROGRESSING AS EXPECTED  Goal: Will remain free from falls  9/14/2020 1023 by Mahi Aguirre R.N.  Outcome: PROGRESSING AS EXPECTED  9/14/2020 1023 by YENIFER HarrisN.  Outcome: PROGRESSING AS EXPECTED     Problem: Mobility  Goal: Risk for activity intolerance will decrease  9/14/2020 1023 by Mahi Aguirre R.N.  Outcome: PROGRESSING AS EXPECTED  9/14/2020 1023 by YENIFER HarrisN.  Outcome: PROGRESSING AS EXPECTED     Problem: Pain Management  Goal: Pain level will decrease to patient's comfort goal  9/14/2020 1023 by Mahi Aguirre R.N.  Outcome: PROGRESSING AS EXPECTED  9/14/2020 1023 by YENIFER HarrisN.  Outcome: PROGRESSING AS EXPECTED     Problem: Respiratory:  Goal: Respiratory status will improve  Outcome: PROGRESSING AS EXPECTED     Problem: Fluid Volume:  Goal: Will maintain balanced intake and output  9/14/2020 1023 by Mahi Aguirre R.N.  Outcome: PROGRESSING AS EXPECTED  9/14/2020 1023 by YENIFER HarrisN.  Outcome: PROGRESSING AS EXPECTED

## 2020-09-14 NOTE — PROGRESS NOTES
Pharmacy Kinetics 29 y.o. male on vancomycin day # 3 9/14/2020    Currently on Vancomycin 1250 mg iv q8hr (0100, 0900, 1700)  Provider specified end date: TBD  Other antibiotics: Unasyn 3 g IV q6h    Indication for Treatment: cellulitis of RLE    Pertinent history per medical record: Admitted on 9/12/2020 for Rt leg cellulitis. 30 y/o M w/ hx of IVDU and recent R shoulder abscess s/p drainage in ER, discharged on PO abx, admitted with R calf pain redness and swelling found to have right lower extremity cellulitis. He was started on Unasyn and vancomycin.  A CT scan of the lower extremity was performed and revealed extensive edema in the right calf, no free air was seen however it was suggestive of myositis versus fasciitis.  Due to slower than expected clinical improvement, I consulted orthopedic surgery to evaluate for necrotizing fasciitis versus compartment syndrome.  They examined the patient and reviewed his imaging and ultimately determined he would likely benefit from a washout of the lower leg.      Allergies: Patient has no known allergies.     List concerns for renal function: none currently     Pertinent cultures to date:   9/12/20 - R leg abscess: NGTD  9/12/20 - R leg abscess membrane: NGTD  9/10/20 - Peripheral BC x 2: NGTD  MRSA nares swab if pneumonia is a concern (ordered/positive/negative/n-a): N/A    Recent Labs     09/12/20  0503 09/13/20  0501 09/14/20  0915   WBC 16.3* 19.5* 14.7*   NEUTSPOLYS 83.90* 93.00*  --      Recent Labs     09/12/20  0503 09/13/20  0501 09/14/20  0915   BUN 13 15 17   CREATININE 0.71 0.75 0.70   ALBUMIN  --  3.6 3.5     Recent Labs     09/12/20  0503 09/13/20  0501   VANCOTROUGH 8.3* 14.0       Intake/Output Summary (Last 24 hours) at 9/14/2020 1603  Last data filed at 9/14/2020 1200  Gross per 24 hour   Intake 540 ml   Output 410 ml   Net 130 ml      Blood Pressure 124/60   Pulse 69   Temperature 36.2 °C (97.2 °F) (Temporal)   Respiration 18   Height 1.778 m (5'  "10\")   Weight 66.5 kg (146 lb 9.7 oz)   Oxygen Saturation 98%  Temp (24hrs), Av.6 °C (97.8 °F), Min:36.2 °C (97.2 °F), Max:37.1 °C (98.7 °F)      A/P   1. Vancomycin dose change: continue current dosing   2. Next vancomycin level: ~2 days   3. Goal trough: 10-15 mcg/mL   4. Comments: Renal indices stable per BUN/SCr.  Wound cultures remain NGTD.  Per notes today, plan to curbside ID once cultures have finalized.  Continue with current dosing and plan for repeat level in a few days as noted above.    Laura Crowder, PharmD, BCPS, BCCCP        "

## 2020-09-15 LAB
ANION GAP SERPL CALC-SCNC: 14 MMOL/L (ref 7–16)
BUN SERPL-MCNC: 16 MG/DL (ref 8–22)
CALCIUM SERPL-MCNC: 9.6 MG/DL (ref 8.5–10.5)
CHLORIDE SERPL-SCNC: 98 MMOL/L (ref 96–112)
CO2 SERPL-SCNC: 27 MMOL/L (ref 20–33)
CREAT SERPL-MCNC: 0.78 MG/DL (ref 0.5–1.4)
ERYTHROCYTE [DISTWIDTH] IN BLOOD BY AUTOMATED COUNT: 37.7 FL (ref 35.9–50)
GLUCOSE SERPL-MCNC: 92 MG/DL (ref 65–99)
HCT VFR BLD AUTO: 39 % (ref 42–52)
HGB BLD-MCNC: 12.9 G/DL (ref 14–18)
MCH RBC QN AUTO: 28.1 PG (ref 27–33)
MCHC RBC AUTO-ENTMCNC: 33.1 G/DL (ref 33.7–35.3)
MCV RBC AUTO: 85 FL (ref 81.4–97.8)
PLATELET # BLD AUTO: 251 K/UL (ref 164–446)
PMV BLD AUTO: 9.7 FL (ref 9–12.9)
POTASSIUM SERPL-SCNC: 4.2 MMOL/L (ref 3.6–5.5)
RBC # BLD AUTO: 4.59 M/UL (ref 4.7–6.1)
SODIUM SERPL-SCNC: 139 MMOL/L (ref 135–145)
WBC # BLD AUTO: 7.8 K/UL (ref 4.8–10.8)

## 2020-09-15 PROCEDURE — 99255 IP/OBS CONSLTJ NEW/EST HI 80: CPT | Performed by: INTERNAL MEDICINE

## 2020-09-15 PROCEDURE — 700102 HCHG RX REV CODE 250 W/ 637 OVERRIDE(OP): Performed by: INTERNAL MEDICINE

## 2020-09-15 PROCEDURE — 700105 HCHG RX REV CODE 258: Performed by: INTERNAL MEDICINE

## 2020-09-15 PROCEDURE — 97116 GAIT TRAINING THERAPY: CPT | Mod: CQ

## 2020-09-15 PROCEDURE — 700111 HCHG RX REV CODE 636 W/ 250 OVERRIDE (IP): Performed by: INTERNAL MEDICINE

## 2020-09-15 PROCEDURE — A9270 NON-COVERED ITEM OR SERVICE: HCPCS | Performed by: INTERNAL MEDICINE

## 2020-09-15 PROCEDURE — 85027 COMPLETE CBC AUTOMATED: CPT

## 2020-09-15 PROCEDURE — 36415 COLL VENOUS BLD VENIPUNCTURE: CPT

## 2020-09-15 PROCEDURE — 700102 HCHG RX REV CODE 250 W/ 637 OVERRIDE(OP): Performed by: PHYSICIAN ASSISTANT

## 2020-09-15 PROCEDURE — 97530 THERAPEUTIC ACTIVITIES: CPT | Mod: CQ

## 2020-09-15 PROCEDURE — A9270 NON-COVERED ITEM OR SERVICE: HCPCS | Performed by: PHYSICIAN ASSISTANT

## 2020-09-15 PROCEDURE — 99233 SBSQ HOSP IP/OBS HIGH 50: CPT | Performed by: INTERNAL MEDICINE

## 2020-09-15 PROCEDURE — 770001 HCHG ROOM/CARE - MED/SURG/GYN PRIV*

## 2020-09-15 PROCEDURE — 80048 BASIC METABOLIC PNL TOTAL CA: CPT

## 2020-09-15 RX ORDER — AMOXICILLIN 250 MG
1 CAPSULE ORAL 2 TIMES DAILY
Status: DISCONTINUED | OUTPATIENT
Start: 2020-09-15 | End: 2020-09-16 | Stop reason: HOSPADM

## 2020-09-15 RX ORDER — SULFAMETHOXAZOLE AND TRIMETHOPRIM 800; 160 MG/1; MG/1
1 TABLET ORAL EVERY 12 HOURS
Status: DISCONTINUED | OUTPATIENT
Start: 2020-09-15 | End: 2020-09-16 | Stop reason: HOSPADM

## 2020-09-15 RX ORDER — POLYETHYLENE GLYCOL 3350 17 G/17G
1 POWDER, FOR SOLUTION ORAL 2 TIMES DAILY
Status: DISCONTINUED | OUTPATIENT
Start: 2020-09-15 | End: 2020-09-16 | Stop reason: HOSPADM

## 2020-09-15 RX ORDER — AMOXICILLIN AND CLAVULANATE POTASSIUM 875; 125 MG/1; MG/1
1 TABLET, FILM COATED ORAL EVERY 12 HOURS
Status: DISCONTINUED | OUTPATIENT
Start: 2020-09-15 | End: 2020-09-16 | Stop reason: HOSPADM

## 2020-09-15 RX ORDER — NICOTINE 21 MG/24HR
21 PATCH, TRANSDERMAL 24 HOURS TRANSDERMAL
Status: DISCONTINUED | OUTPATIENT
Start: 2020-09-15 | End: 2020-09-16 | Stop reason: HOSPADM

## 2020-09-15 RX ADMIN — DOCUSATE SODIUM 50 MG AND SENNOSIDES 8.6 MG 2 TABLET: 8.6; 5 TABLET, FILM COATED ORAL at 04:10

## 2020-09-15 RX ADMIN — NICOTINE TRANSDERMAL SYSTEM 21 MG: 21 PATCH, EXTENDED RELEASE TRANSDERMAL at 15:23

## 2020-09-15 RX ADMIN — OXYCODONE HYDROCHLORIDE 10 MG: 5 TABLET ORAL at 15:23

## 2020-09-15 RX ADMIN — ALPRAZOLAM 0.5 MG: 0.5 TABLET ORAL at 21:58

## 2020-09-15 RX ADMIN — SODIUM CHLORIDE 3 G: 900 INJECTION INTRAVENOUS at 08:01

## 2020-09-15 RX ADMIN — SULFAMETHOXAZOLE AND TRIMETHOPRIM 1 TABLET: 800; 160 TABLET ORAL at 17:56

## 2020-09-15 RX ADMIN — ALPRAZOLAM 0.5 MG: 0.5 TABLET ORAL at 15:29

## 2020-09-15 RX ADMIN — VANCOMYCIN HYDROCHLORIDE 1250 MG: 500 INJECTION, POWDER, LYOPHILIZED, FOR SOLUTION INTRAVENOUS at 10:26

## 2020-09-15 RX ADMIN — OXYCODONE HYDROCHLORIDE 10 MG: 5 TABLET ORAL at 04:09

## 2020-09-15 RX ADMIN — OXYCODONE HYDROCHLORIDE 10 MG: 5 TABLET ORAL at 08:00

## 2020-09-15 RX ADMIN — CITALOPRAM HYDROBROMIDE 20 MG: 20 TABLET ORAL at 04:10

## 2020-09-15 RX ADMIN — OXYCODONE HYDROCHLORIDE 10 MG: 5 TABLET ORAL at 12:36

## 2020-09-15 RX ADMIN — VANCOMYCIN HYDROCHLORIDE 1250 MG: 500 INJECTION, POWDER, LYOPHILIZED, FOR SOLUTION INTRAVENOUS at 02:22

## 2020-09-15 RX ADMIN — OXYCODONE HYDROCHLORIDE 10 MG: 5 TABLET ORAL at 20:21

## 2020-09-15 RX ADMIN — SODIUM CHLORIDE 3 G: 900 INJECTION INTRAVENOUS at 01:31

## 2020-09-15 RX ADMIN — AMOXICILLIN AND CLAVULANATE POTASSIUM 1 TABLET: 875; 125 TABLET, FILM COATED ORAL at 15:24

## 2020-09-15 ASSESSMENT — COGNITIVE AND FUNCTIONAL STATUS - GENERAL
CLIMB 3 TO 5 STEPS WITH RAILING: A LITTLE
SUGGESTED CMS G CODE MODIFIER MOBILITY: CI
MOBILITY SCORE: 23

## 2020-09-15 ASSESSMENT — GAIT ASSESSMENTS
ASSISTIVE DEVICE: CRUTCHES
DEVIATION: STEP TO
DISTANCE (FEET): 50
GAIT LEVEL OF ASSIST: SUPERVISED

## 2020-09-15 ASSESSMENT — ENCOUNTER SYMPTOMS
WEAKNESS: 0
DIAPHORESIS: 1
INSOMNIA: 0
LOSS OF CONSCIOUSNESS: 0
CONSTIPATION: 0
WHEEZING: 0
FOCAL WEAKNESS: 0
VOMITING: 0
DIARRHEA: 0
DIZZINESS: 0
PALPITATIONS: 0
NERVOUS/ANXIOUS: 0
FEVER: 0
EYE REDNESS: 0
CHILLS: 1
FALLS: 0
ABDOMINAL PAIN: 0
SEIZURES: 0
NAUSEA: 0
EYE PAIN: 0
COUGH: 0
MYALGIAS: 1
HEMOPTYSIS: 0
TREMORS: 0
BLOOD IN STOOL: 0
HEADACHES: 0
SHORTNESS OF BREATH: 0

## 2020-09-15 ASSESSMENT — PAIN DESCRIPTION - PAIN TYPE
TYPE: SURGICAL PAIN

## 2020-09-15 NOTE — PROGRESS NOTES
Nursing informed me that patient is refusing intravenous medications, IV access and further blood draws.  Antibiotics changed to oral, nicotine patch ordered.  Inform nursing that have discussed the case with Dr. Andrea who recommends hospitalization until tomorrow with JANEY drain remaining in place.    Brenda Arellano M.D.  09/15/20  1:47 PM

## 2020-09-15 NOTE — THERAPY
"Physical Therapy   Initial Evaluation     Patient Name: Luther Forrester  Age:  29 y.o., Sex:  male  Medical Record #: [id number]  Today's Date: 9/14/2020     Precautions: Fall Risk, Weight Bearing As Tolerated Right Lower Extremity    Assessment  Patient is 29 y.o. male with a diagnosis of  Incision and drainage of complex right leg abscess, pt is an IV drug user, reports desire to \"go through detox\" .  Additional factors influencing patient status / progress: pt with pain issues today, but cooperative with OOB and ambulation using FWW. Pt is unable to tolerate weight bearing R LE or dorsiflexion R ankle due to pain.  PT to follow to trial crutches vs FWW. Pt lives alone, but Mom is an RN who lives locally and can help.       Plan    Recommend Physical Therapy 4 times per week until therapy goals are met for the following treatments:  Bed Mobility, Gait Training, Neuro Re-Education / Balance and Therapeutic Activities    DC Equipment Recommendations: Unable to determine at this time(FWW vs crutches)  Discharge Recommendations: Recommend home health for continued physical therapy services(home health for wound check)        Objective       09/14/20 1610   Prior Living Situation   Prior Services None   Housing / Facility 1 Story House;1 Story Apartment / Condo   Steps Into Home 0   Steps In Home 0   Equipment Owned None   Lives with - Patient's Self Care Capacity Alone and Able to Care For Self   Comments Mom lives locally, is an RN, can help.    Prior Level of Functional Mobility   Bed Mobility Independent   Transfer Status Independent   Ambulation Independent   Stairs Independent   Gait Analysis   Gait Level Of Assist Supervised   Assistive Device Front Wheel Walker   Distance (Feet) 20   Weight Bearing Status WBAT R LE   Comments does not tolerate standing long due to throbbing with dependent R LE   Bed Mobility    Supine to Sit Supervised   Sit to Supine Supervised   Scooting Supervised   Rolling Supervised "   Functional Mobility   Sit to Stand Supervised   Bed, Chair, Wheelchair Transfer Supervised   Transfer Method Stand Pivot   Comments education done re therex to address R ankle, gentle, partial WB R LE towards neutral. Pt also doing APs in bed as able.    Short Term Goals    Short Term Goal # 1 Pt will ambulate x 100 feet using crutches vs FWW (pt choice) with supervision in 6 visits to improve functional indep.    Short Term Goal # 2 Pt will reach neutral R ankle in standing in 6 visits to improve gait.    Anticipated Discharge Equipment and Recommendations   DC Equipment Recommendations Unable to determine at this time  (FWW vs crutches)   Discharge Recommendations Recommend home health for continued physical therapy services  (home health for wound check)

## 2020-09-15 NOTE — THERAPY
"Physical Therapy   Daily Treatment     Patient Name: Luther Forrester  Age:  29 y.o., Sex:  male  Medical Record #: [id number]  Today's Date: 9/15/2020     Precautions: Fall Risk, Weight Bearing As Tolerated Right Lower Extremity    Assessment    Pt progressing well w/ therapy. Pt requesting to trial crutches today instead of the FWW. He was able to ambulate at a [facility] utilizing the crutches. Pt assumes NWB of the R LE and has increased pain as the LE is in a gravity dependent position so he holds it up in 90 degrees hip and knee flexion. Pt needing a lot of educated on R ankle ROM and increased tolerance to weight bearing. Pt open to education and working on R ankle DF post tx.     Plan    Continue current treatment plan.    DC Equipment Recommendations: Crutches  Discharge Recommendations: Recommend home health for continued physical therapy services      Subjective    \"I've used crutches before when I didn't need them to just play around on them.\"     Objective       09/15/20 0816   Precautions   Precautions Fall Risk;Weight Bearing As Tolerated Right Lower Extremity   Comments JANEY drain R LE   Gait Analysis   Gait Level Of Assist Supervised   Assistive Device Crutches   Distance (Feet) 50   # of Times Distance was Traveled 1   Deviation Step To   Comments One minor posterior LOB but able to self correct. Pt needing cues to relax R LE as he ambulated w/ it in a 90 degree hip and knee flexion.   Bed Mobility    Supine to Sit Modified Independent   Sit to Supine Modified Independent   Scooting Modified Independent   Rolling Modified Independent   Functional Mobility   Sit to Stand Supervised   Bed, Chair, Wheelchair Transfer Supervised   Transfer Method Other (Comments)  (Ambulating)   Mobility With crutches   Short Term Goals    Short Term Goal # 1 Pt will ambulate x 100 feet using crutches vs FWW (pt choice) with supervision in 6 visits to improve functional indep.    Goal Outcome # 1 Progressing as expected "   Short Term Goal # 2 Pt will reach neutral R ankle in standing in 6 visits to improve gait.    Goal Outcome # 2 Goal not met

## 2020-09-15 NOTE — PROGRESS NOTES
Patient did well with pain control today, still not putting any weight on right leg.  Patient safe as of shift change.

## 2020-09-15 NOTE — CARE PLAN
Problem: Communication  Goal: The ability to communicate needs accurately and effectively will improve  Outcome: PROGRESSING AS EXPECTED     Problem: Safety  Goal: Will remain free from injury  Outcome: PROGRESSING AS EXPECTED  Goal: Will remain free from falls  Outcome: PROGRESSING AS EXPECTED     Problem: Infection  Goal: Will remain free from infection  Outcome: PROGRESSING AS EXPECTED     Problem: Venous Thromboembolism (VTW)/Deep Vein Thrombosis (DVT) Prevention:  Goal: Patient will participate in Venous Thrombosis (VTE)/Deep Vein Thrombosis (DVT)Prevention Measures  Outcome: PROGRESSING AS EXPECTED     Problem: Bowel/Gastric:  Goal: Normal bowel function is maintained or improved  Outcome: PROGRESSING AS EXPECTED  Goal: Will not experience complications related to bowel motility  Outcome: PROGRESSING AS EXPECTED     Problem: Knowledge Deficit  Goal: Knowledge of disease process/condition, treatment plan, diagnostic tests, and medications will improve  Outcome: PROGRESSING AS EXPECTED  Goal: Knowledge of the prescribed therapeutic regimen will improve  Outcome: PROGRESSING AS EXPECTED     Problem: Discharge Barriers/Planning  Goal: Patient's continuum of care needs will be met  Outcome: PROGRESSING AS EXPECTED     Problem: Mobility  Goal: Risk for activity intolerance will decrease  Outcome: PROGRESSING AS EXPECTED     Problem: Respiratory:  Goal: Respiratory status will improve  Outcome: PROGRESSING AS EXPECTED     Problem: Fluid Volume:  Goal: Will maintain balanced intake and output  Outcome: PROGRESSING AS EXPECTED     Problem: Psychosocial Needs:  Goal: Level of anxiety will decrease  Outcome: PROGRESSING AS EXPECTED

## 2020-09-15 NOTE — PROGRESS NOTES
[facility] Medicine Daily Progress Note    Date of Service  9/15/2020    Chief Complaint  29 y.o. male on 9/12/2020 as a transfer from [facility] for R leg cellulitis (see Dr. Hernandez's HnP and d/c summary) for Orthopedic intervenction.    Hospital Course      Patient admitted with right lower extremity abscess in the setting of IV drug use status post I&D by orthopedics, ID consulted for antibiotic guidance.       Interval Problem Update  Patient seen and evaluated on rounds  Discussed with nursing staff on rounds  Pain remains controlled  Plan of care discussed with patient, questions and concerns answered  Infectious disease team consulted by me    Consultants/Specialty  Orthopedics  Infectious disease    Code Status  Full Code    Disposition  Okay for oral antibiotics per ID, determine postoperative wound care plan, drain plan per orthopedics.  If cleared from orthopedics perspective anticipate discharge over the next 24 hours.    Review of Systems  Review of Systems   Constitutional: Positive for chills, diaphoresis and malaise/fatigue. Negative for fever.   HENT: Negative for congestion, hearing loss and nosebleeds.    Eyes: Negative for pain and redness.   Respiratory: Negative for cough, hemoptysis, shortness of breath and wheezing.    Cardiovascular: Negative for chest pain and palpitations.   Gastrointestinal: Negative for abdominal pain, blood in stool, constipation, diarrhea, nausea and vomiting.   Genitourinary: Negative for dysuria, frequency and hematuria.   Musculoskeletal: Positive for myalgias. Negative for falls and joint pain.   Skin: Positive for rash.   Neurological: Negative for dizziness, tremors, focal weakness, seizures, loss of consciousness, weakness and headaches.   Psychiatric/Behavioral: The patient is not nervous/anxious and does not have insomnia.    All other systems reviewed and are negative.       Physical Exam  Temp:  [36.6 °C (97.8 °F)-36.9 °C (98.5 °F)] 36.9 °C (98.5  °F)  Pulse:  [66-75] 67  Resp:  [16-18] 16  BP: (121-148)/(51-69) 133/66  SpO2:  [96 %-97 %] 97 %    Physical Exam  Vitals signs and nursing note reviewed.   Constitutional:       Appearance: He is ill-appearing.   HENT:      Head: Normocephalic and atraumatic.      Right Ear: External ear normal.      Left Ear: External ear normal.      Nose: Nose normal.      Mouth/Throat:      Mouth: Mucous membranes are moist.   Eyes:      General: No scleral icterus.     Conjunctiva/sclera: Conjunctivae normal.   Neck:      Musculoskeletal: Normal range of motion and neck supple.   Cardiovascular:      Rate and Rhythm: Normal rate and regular rhythm.      Heart sounds: No murmur. No friction rub. No gallop.    Pulmonary:      Effort: Pulmonary effort is normal.      Breath sounds: Normal breath sounds.   Abdominal:      General: Abdomen is flat. Bowel sounds are normal. There is no distension.      Palpations: Abdomen is soft.      Tenderness: There is no abdominal tenderness. There is no guarding.   Musculoskeletal: Normal range of motion.         General: Tenderness (R leg) present.      Comments: R leg bandages CDI  Drain in place   Skin:     General: Skin is warm.      Findings: Erythema (R leg) and rash (R leg) present.   Neurological:      Mental Status: He is alert and oriented to person, place, and time. Mental status is at baseline.   Psychiatric:         Mood and Affect: Mood normal.         Behavior: Behavior normal.         Thought Content: Thought content normal.         Judgment: Judgment normal.      Comments: Less anxious         Fluids    Intake/Output Summary (Last 24 hours) at 9/15/2020 1321  Last data filed at 9/15/2020 1200  Gross per 24 hour   Intake 720 ml   Output 1000 ml   Net -280 ml       Laboratory  Recent Labs     09/13/20  0501 09/14/20  0915 09/15/20  0904   WBC 19.5* 14.7* 7.8   RBC 4.59* 4.05* 4.59*   HEMOGLOBIN 13.0* 11.5* 12.9*   HEMATOCRIT 39.0* 34.3* 39.0*   MCV 85.0 84.7 85.0   MCH 28.3  28.4 28.1   MCHC 33.3* 33.5* 33.1*   RDW 37.9 38.1 37.7   PLATELETCT 206 225 251   MPV 9.9 9.8 9.7     Recent Labs     09/13/20  0501 09/14/20  0915 09/15/20  0904   SODIUM 137 139 139   POTASSIUM 4.5 4.2 4.2   CHLORIDE 99 102 98   CO2 24 26 27   GLUCOSE 230* 107* 92   BUN 15 17 16   CREATININE 0.75 0.70 0.78   CALCIUM 9.2 8.8 9.6                   Imaging  No orders to display        Assessment/Plan  * Cellulitis of right leg, IVDU to that area, recent S. viridian abscess of R shoulder- (present on admission)  Assessment & Plan  Status post irrigation and debridement per orthopedics team this hospitalization    Continue IV Unasyn and vancomycin  Monitor for vancomycin toxicity and therapeutics  I consulted with infectious disease team, case discussed with Dr. Rocha for guidance with respect to antibiotic therapy, she will evaluate the patient    Continue ongoing postoperative care per surgical team, Dr. Andrea  Drain to remain in place per Dr. Andrea, discussed with Dr. Andrea from orthopedics  Further surgical interventions per orthopedics team    Continue pain control    If transition to oral antibiotic therapy and cleared from orthopedics perspective, anticipating discharge home soon    History of substance abuse (HCC)- (present on admission)  Assessment & Plan  Heroin  Supportive management for withdrawal symptoms and anxiety  SW for resources for substance dependence management    Hyperglycemia- (present on admission)  Assessment & Plan  Obtain A1c  May need insulin for blood sugar control (ideally 140-180) even if prediabetic. Await A1c.    Anxiety- (present on admission)  Assessment & Plan  PRN Xanax    Abscess of right shoulder- (present on admission)  Assessment & Plan  Noted  S. viridians lana culture    SC Lovenox for DVT PPx

## 2020-09-15 NOTE — PROGRESS NOTES
"   Orthopaedic Progress Note    Interval changes:  Patient doing well   JANEY in place with no output documented over last 12 hours-DC drain  Cleared for DC by ortho pending medicine clearance    ROS - Patient denies any new issues except per above.       /66   Pulse 67   Temp 36.9 °C (98.5 °F) (Temporal)   Resp 16   Ht 1.778 m (5' 10\")   Wt 66.5 kg (146 lb 9.7 oz)   SpO2 97%       Patient seen and examined  No acute distress  Breathing non labored  RRR  RLE dressings CDI, DNVI, JANEY in place with serosanguinous exudate, moves all toes, cap refill <2 sec.     Recent Labs     09/13/20  0501 09/14/20  0915 09/15/20  0904   WBC 19.5* 14.7* 7.8   RBC 4.59* 4.05* 4.59*   HEMOGLOBIN 13.0* 11.5* 12.9*   HEMATOCRIT 39.0* 34.3* 39.0*   MCV 85.0 84.7 85.0   MCH 28.3 28.4 28.1   MCHC 33.3* 33.5* 33.1*   RDW 37.9 38.1 37.7   PLATELETCT 206 225 251   MPV 9.9 9.8 9.7       Active Hospital Problems    Diagnosis   • Cellulitis of right leg, IVDU to that area, recent S. viridian abscess of R shoulder [L03.115]     Priority: High   • History of substance abuse (HCC) [F19.11]     Priority: Medium   • Abscess of right shoulder [L02.413]     Priority: Low   • Hyperglycemia [R73.9]   • Anxiety [F41.9]       Assessment/Plan:  Patient doing well    POD#3 S/P Incision and drainage of complex right leg abscess deep to fascia.  Wt bearing status - WBAT  Wound care/Drains - dressing left in place  Future Procedures - none planned   Lovenox: Start 9/14, Duration-until ambulatory > 150'  Sutures/Staples out- 10-14 days post operatively  PT/OT-initiated  Antibiotics:augmentin, bactrim  DVT Prophylaxis- TEDS/SCDs/Foot pumps  Riddle-none  Case Coordination for Discharge Planning - Disposition pending abx needs     "

## 2020-09-15 NOTE — CARE PLAN
Problem: Communication  Goal: The ability to communicate needs accurately and effectively will improve  Outcome: PROGRESSING AS EXPECTED  Demonstrates ability to use call light appropriately  Problem: Safety  Goal: Will remain free from injury  Outcome: PROGRESSING AS EXPECTED  Verbalizes understanding of fall risk interventions     Problem: Pain Management  Goal: Pain level will decrease to patient's comfort goal  Outcome: PROGRESSING AS EXPECTED   Uses numeric pain scale to rate pain appropriately

## 2020-09-15 NOTE — CONSULTS
INFECTIOUS DISEASES INPATIENT CONSULT NOTE     Date of Service: 9/15/2020    Consult Requested By: Brenda Arellano M.D.    Reason for Consultation: Right calf abscess    History of Present Illness:   Luther Forrester is a 29 y.o. man with a history of ongoing IV drug use with heroin and recent right upper extremity abscess status post drainage in the ED admitted 9/12/2020 for right calf pain, erythema and swelling.  He was recently seen in the ED at [facility] on 9/3 for right shoulder and arm pain.  Patient admitted that he had been injecting at the site.  He was found to have an abscess and had bedside I&D by the ER physician.  Wound cultures grew viridans Streptococcus.  Patient was discharged on clindamycin for 10-day course.  Patient states that he was taking clindamycin up until this admission.  He states his left arm abscess has completely resolved.  He subsequently developed increasing right calf pain and swelling on Wednesday prior to admission.  He denies injecting at that site recently.  No trauma or injury.  His pain was exacerbated by walking and standing.  He denies any recent fevers or chills.  No nausea, vomiting, abdominal pain or diarrhea.  Given his persistent symptoms, he initially presented to [facility] ER.  On presentation, he was febrile with a T-max of 100.1 and a leukocytosis of 16.3.  Patient was found to have right lower extremity cellulitis.  Ultrasound was negative for DVT.  CT scan did not show any fracture but it revealed extensive edema/induration of the subcutaneous fat involving the lower leg.  There is also fluid extending through the gastrocnemius muscle and dissecting between the deep muscle layers between the gastrocnemius and soleus muscle.  There is no evidence of any intra-muscular abscess.  He was transferred to [facility] given lack of operating rooms.  He was evaluated by orthopedic surgery and is now status post incision and drainage of a complex right lower  extremity abscess on 9/12/2020 by Dr. Andrea.  Abscess was noted and extending down through the plane between the gastrocnemius and soleus muscles with an organized phlegmon per the operative note.  Operative cultures are negative to date.  Patient is currently on vancomycin and Unasyn.  Infectious disease service consulted for further antibiotic recommendations and management.      All other review of systems reviewed and negative except those documented above in the HPI.     PMH:   Ongoing IV drug use with heroin and methamphetamines  Recent right upper extremity abscess  Noncompliance  Hepatitis C antibody +2 years ago    PSH:  Past Surgical History:   Procedure Laterality Date   • IRRIGATION & DEBRIDEMENT ORTHO Right 9/12/2020    Procedure: IRRIGATION AND DEBRIDEMENT, WOUND;  Surgeon: Brian Andrea M.D.;  Location: SURGERY [facility];  Service: Orthopedics   • DENTAL EXTRACTION(S)      wisdom teeth   • TONSILLECTOMY         FAMILY HX:  History reviewed. No pertinent family history.    SOCIAL HX:  Social History     Socioeconomic History   • Marital status: Single     Spouse name: Not on file   • Number of children: Not on file   • Years of education: Not on file   • Highest education level: Not on file   Occupational History   • Not on file   Social Needs   • Financial resource strain: Not on file   • Food insecurity     Worry: Not on file     Inability: Not on file   • Transportation needs     Medical: Not on file     Non-medical: Not on file   Tobacco Use   • Smoking status: Current Every Day Smoker     Packs/day: 0.50   • Smokeless tobacco: Former User     Types: Chew     Quit date: 5/9/2018   • Tobacco comment: occassionally on chew 1-2 times a day   Substance and Sexual Activity   • Alcohol use: No   • Drug use: Yes     Types: IV, Methamphetamines, Intravenous     Comment: heroin/meth   • Sexual activity: Not on file   Lifestyle   • Physical activity     Days per week: Not on file     Minutes per  session: Not on file   • Stress: Not on file   Relationships   • Social connections     Talks on phone: Not on file     Gets together: Not on file     Attends [employer] service: Not on file     Active member of club or organization: Not on file     Attends meetings of clubs or organizations: Not on file     Relationship status: Not on file   • Intimate partner violence     Fear of current or ex partner: Not on file     Emotionally abused: Not on file     Physically abused: Not on file     Forced sexual activity: Not on file   Other Topics Concern   • Not on file   Social History Narrative   • Not on file     Social History     Tobacco Use   Smoking Status Current Every Day Smoker   • Packs/day: 0.50   Smokeless Tobacco Former User   • Types: Chew   • Quit date: 5/9/2018   Tobacco Comment    occassionally on chew 1-2 times a day     Social History     Substance and Sexual Activity   Alcohol Use No       Allergies/Intolerances:  No Known Allergies    History reviewed with the patient     Other Current Medications:    Current Facility-Administered Medications:   •  ALPRAZolam (XANAX) tablet 0.5 mg, 0.5 mg, Oral, BID PRN, Joe Crowe M.D., 0.5 mg at 09/14/20 2110  •  enoxaparin (LOVENOX) inj 40 mg, 40 mg, Subcutaneous, DAILY, Joe Crowe M.D., 40 mg at 09/14/20 0543  •  oxyCODONE immediate-release (ROXICODONE) tablet 5-10 mg, 5-10 mg, Oral, Q3HRS PRN, Khris Solomon P.A.-C., 10 mg at 09/15/20 0800  •  ondansetron (ZOFRAN ODT) dispertab 4 mg, 4 mg, Oral, Q4HRS PRN, JOHAN Coelho.O.  •  ondansetron (ZOFRAN) syringe/vial injection 4 mg, 4 mg, Intravenous, Q4HRS PRN, Teagan Hernadnez D.O.  •  prochlorperazine (COMPAZINE) injection 5-10 mg, 5-10 mg, Intravenous, Q4HRS PRN, JOHAN Coelho.O.  •  promethazine (PHENERGAN) suppository 12.5-25 mg, 12.5-25 mg, Rectal, Q4HRS PRN, Teagan Hernandez D.O.  •  promethazine (PHENERGAN) tablet 12.5-25 mg, 12.5-25 mg, Oral, Q4HRS PRN, Teagan Hernadnez D.O.  •   "senna-docusate (PERICOLACE or SENOKOT S) 8.6-50 MG per tablet 2 Tab, 2 Tab, Oral, BID, 2 Tab at 09/15/20 0410 **AND** polyethylene glycol/lytes (MIRALAX) PACKET 1 Packet, 1 Packet, Oral, QDAY PRN **AND** magnesium hydroxide (MILK OF MAGNESIA) suspension 30 mL, 30 mL, Oral, QDAY PRN **AND** bisacodyl (DULCOLAX) suppository 10 mg, 10 mg, Rectal, QDAY PRN, Teagan Hernandez D.O.  •  acetaminophen (TYLENOL) tablet 650 mg, 650 mg, Oral, Q6HRS PRN, Teagan Hernandez D.O., 650 mg at 208  •  ampicillin/sulbactam (UNASYN) 3 g in  mL IVPB, 3 g, Intravenous, Q6HRS, Teagan Hernandez D.O., Last Rate: 200 mL/hr at 09/15/20 08, 3 g at 09/15/20 08  •  citalopram (CELEXA) tablet 20 mg, 20 mg, Oral, DAILY, Teagan Hernandez D.O., 20 mg at 09/15/20 0410  •  MD Alert...Vancomycin per Pharmacy, , Other, PHARMACY TO DOSE, Teagan Hernandez D.O.  •  morphine (pf) 4 MG/ML injection 2-5 mg, 2-5 mg, Intravenous, Q4HRS PRN, Teagan Hernandez D.O., 4 mg at 20 040  •  vancomycin (VANCOCIN) 1,250 mg in  mL IVPB, 1,250 mg, Intravenous, Q8HR, Teagan Hernandez D.O., Stopped at 09/15/20 0422  [unfilled]    Most Recent Vital Signs:  /66   Pulse 67   Temp 36.9 °C (98.5 °F) (Temporal)   Resp 16   Ht 1.778 m (5' 10\")   Wt 66.5 kg (146 lb 9.7 oz)   SpO2 97%   BMI 21.04 kg/m²   Temp  Av.6 °C (97.9 °F)  Min: 35.8 °C (96.5 °F)  Max: 37.8 °C (100.1 °F)    Physical Exam:  General: well nourished, no diaphoresis, well-appearing, no acute distress  HEENT: sclera anicteric, PERRL, extraocular muscles intact, mucous membranes moist, oropharynx clear and moist, no oral lesions or exudate  Neck: supple, no lymphadenopathy  Chest: CTAB, no rales, rhonchi or wheezes, normal work of breathing.  Cardiac: regular rate and rhythm, normal S1 S2, no murmurs, rubs or gallops  Abdomen: + bowel sounds, soft, non-tender, non-distended, no hepatosplenomegaly  Extremities: Right upper extremity prior I&D site clean.  " Right calf surgical site with sutures in place, well approximated without any active drainage or surrounding erythema.  JANEY drain with scant serosanguineous fluid in bulb  Skin: warm and dry, no rashes or worrisome lesions.  Scattered tattoos  Neuro: Alert and oriented times 3, non-focal exam, speech fluent, full range of motion to bilateral upper and lower extremities  Psych: normal mood and behavior, pleasant; memory intact, normal judgement    Pertinent Lab Results:  Recent Labs     09/13/20  0501 09/14/20  0915 09/15/20  0904   WBC 19.5* 14.7* 7.8      Recent Labs     09/13/20  0501 09/14/20  0915 09/15/20  0904   HEMOGLOBIN 13.0* 11.5* 12.9*   HEMATOCRIT 39.0* 34.3* 39.0*   MCV 85.0 84.7 85.0   MCH 28.3 28.4 28.1   PLATELETCT 206 225 251         Recent Labs     09/13/20  0501 09/14/20  0915   SODIUM 137 139   POTASSIUM 4.5 4.2   CHLORIDE 99 102   CO2 24 26   CREATININE 0.75 0.70        Recent Labs     09/13/20  0501 09/14/20  0915   ALBUMIN 3.6 3.5        Pertinent Micro:  Results     Procedure Component Value Units Date/Time    CULTURE TISSUE W/ GRM STAIN [[id number]] Collected: 09/12/20 2312    Order Status: Completed Specimen: Tissue Updated: 09/15/20 0850     Significant Indicator NEG     Source TISS     Site RIGHT LEG ABSCESS MEMBRANE     Culture Result No growth at 48 hours.     Gram Stain Result No organisms seen.    Narrative:      Surgery Specimen    Anaerobic Culture [[id number]] Collected: 09/12/20 2312    Order Status: Completed Specimen: Tissue Updated: 09/15/20 0850     Significant Indicator NEG     Source TISS     Site RIGHT LEG ABSCESS MEMBRANE     Culture Result Culture in progress.    Narrative:      Surgery Specimen    CULTURE WOUND W/ GRAM STAIN [[id number]] Collected: 09/12/20 2307    Order Status: Completed Specimen: Wound Updated: 09/15/20 0848     Significant Indicator NEG     Source WND     Site RIGHT LEG ABSCESS     Culture Result No growth at 48 hours.     Gram Stain Result Moderate  WBCs.  No organisms seen.      Narrative:      Surgery - swabs received    Anaerobic Culture [[id number]] Collected: 09/12/20 2307    Order Status: Completed Specimen: Wound Updated: 09/15/20 0848     Significant Indicator NEG     Source WND     Site RIGHT LEG ABSCESS     Culture Result Culture in progress.    Narrative:      Surgery - swabs received    GRAM STAIN [[id number]] Collected: 09/12/20 2312    Order Status: Completed Specimen: Tissue Updated: 09/13/20 1734     Significant Indicator .     Source TISS     Site RIGHT LEG ABSCESS MEMBRANE     Gram Stain Result No organisms seen.    Narrative:      Surgery Specimen    GRAM STAIN [[id number]] Collected: 09/12/20 2307    Order Status: Completed Specimen: Wound Updated: 09/13/20 0833     Significant Indicator .     Source WND     Site RIGHT LEG ABSCESS     Gram Stain Result Moderate WBCs.  No organisms seen.      Narrative:      Surgery - swabs received        No results found for: BLOODCULTU, BLDCULT, BCHOLD     Studies:  Ct-extremity, Lower With Right    Result Date: 9/11/2020 9/11/2020 10:40 AM HISTORY/REASON FOR EXAM:  Left sided leg pain.. TECHNIQUE/EXAM DESCRIPTION AND NUMBER OF VIEWS:  CT scan of the RIGHT lower extremity with contrast, with reconstructions. Thin helical 3 mm sections were obtained from the distal femur through the proximal tibia/fibula. Sagittal and coronal multiplanar reconstructions were generated from the axial images. A total of 100 mL of Omnipaque 350 nonionic contrast was administered  IV without complication. Up to date radiation dose reduction adjustments have been utilized to meet ALARA standards for radiation dose reduction. COMPARISON: None. FINDINGS: There is no evidence of fracture or bony destructive change involving the distal femur or the tibia or fibula. No significant knee joint effusion. There is extensive induration of the subcutaneous fat of the lower leg circumferentially. There is fluid dissecting deep to the lower  leg muscles most prominently posteriorly. There is superficial fluid extending along the course of the gastrocnemius muscles as well most prominently posterior medially where fluid measures up to 9 mm in depth immediately overlying the muscle layer. There is however no evidence of intramuscular fluid collection to suggest presence of intramuscular abscess.     1.  No evidence of tibial or fibular fracture or bony destructive change. 2.  Extensive edema/induration of the subcutaneous fat circumferentially involving the lower leg. There is fluid extending superficial to the lower leg muscles most prominently overlying the medial head of the gastrocnemius muscle which measures 9 mm in depth. Fluid also dissects between the deep muscle layers most prominently between the gastrocnemius and soleus muscles. Consideration should be given for fasciitis as well as myositis. Cellulitis is seen as well. 3.  No evidence of intramuscular abscess.    Dx-chest-portable (1 View)    Result Date: 9/10/2020  9/10/2020 2:49 PM HISTORY/REASON FOR EXAM: Sepsis TECHNIQUE/EXAM DESCRIPTION AND NUMBER OF VIEWS: Single portable view of the chest. COMPARISON: None FINDINGS: There is no evidence of focal consolidation or evidence of pulmonary edema. There is no pleural effusion. The heart is normal in size.     No evidence of acute cardiopulmonary process.    Us-extremity Venous Lower Unilat Right    Result Date: 9/10/2020   Vascular Laboratory  CONCLUSIONS  Normal right lower extremity superficial and deep venous examination.  GENESIS MESSINA  Exam Date:     09/10/2020 14:31  Room #:     Inpatient  Priority:     Stat  Ht (in):             Wt (lb):  Ordering Physician:        KASIE HAMMONDS  Referring Physician:       [id number] Herrera  Sonographer:               Kelsey Lo RVT  Study Type:                Complete Unilateral  Technical Quality:         Adequate  Age:    29    Gender:     M  MRN:    [id number]  :    1990      BSA:   Indications:     Localized swelling, mass and lump, right lower limb, Pain in                    right lower leg  CPT Codes:       33550  ICD Codes:       R22.41  M79.661  History:         Swelling, redness, edema and pain of right mid calf. No prior                   duplex.  Limitations:  PROCEDURES:  Right lower extremity venous duplex imaging.  The following venous structures were evaluated: common femoral, profunda  femoral, proximal greater saphenous, femoral, popliteal, peroneal and  posterior tibial veins.  Serial compression, augmentation maneuvers, color and spectral Doppler flow  evaluations were performed.  FINDINGS:  Right lower extremity -  No evidence of superficial or deep venous thrombosis.  Edema noted in the area of pain.  Complete color filling and compressibility with normal venous flow dynamics  including spontaneous flow, response to augmentation maneuvers, and  respiratory phasicity.  Flow was evaluated in the contralateral common femoral vein and normal  venous flow dynamics including spontaneous flow, respiratory phasic  variation and augmentation were demonstrated.  Josue Meraz MD  (Electronically Signed)  Final Date:      10 September 2020                   15:59    Us-extremity Venous Upper Unilat Right    Result Date: 9/3/2020   Upper Extremity  Venous Duplex Report  Vascular Laboratory  CONCLUSIONS  1) Normal right upper extremity superficial and deep venous examination.  2) Edema  GENESIS MESSINA  Exam Date:     2020 00:06  Room #:     Inpatient  Priority:     Stat  Ht (in):             Wt (lb):  Ordering Physician:        LINDY YANEZ  Referring Physician:       [id number] Oneil  Sonographer:               Sherry Hackett RVT, RDMS  Study Type:                Complete Unilateral  Technical Quality:         Adequate  Age:    29    Gender:     M  MRN:    [id number]  :    1990      BSA:  Indications:     Localized swelling, mass and  lump, right upper limb  CPT Codes:       77553  ICD Codes:       R22.31  History:         Swelling of limb.  Limitations:  PROCEDURES:  Right upper extremity venous duplex imaging.  The following venous structures were evaluated: internal jugular,  subclavian, axillary, brachial, cephalic and basilic veins.  Serial compression, augmentation maneuvers,  color and spectral Doppler  flow evaluations were performed.  FINDINGS:  Right upper extremity.  No superficial or deep venous thrombosis.  Complete color filling and compressibility with normal venous flow dynamics  including spontaneous flow, response to augmentation maneuvers, and  respiratory phasicity.  Edema is demonstrated throughout the biceps.  Flow was evaluated in the contralateral subclavian vein and normal venous  flow dynamics including spontaneous flow, respiratory phasic variation and  augmentation were demonstrated.  Marichuy Stovall MD  (Electronically Signed)  Final Date:      03 September 2020                   07:47      IMPRESSION:   1.  Right lower extremity abscess   2.  Recent right upper extremity abscess  3.  Recent viridans streptococcal infection  4.  Ongoing IV drug abuse      PLAN:   Luther Forrester is a 29 y.o. young man with a history of positive hepatitis C antibody, ongoing IV drug use with methamphetamines and heroin with recent right arm abscess status post I&D with cultures growing viridans Streptococcus discharged home on clindamycin admitted for increasing right lower extremity pain, swelling and erythema.  Patient found to have evidence of soft tissue infection on imaging and is now status post I&D down to fascia on 9/12/2020 by Dr. Andrea.  Operative cultures are negative to date however patient was recently on p.o. antibiotics.  He is otherwise clinically stable with resolution of low-grade fevers and leukocytosis.  Surgical site is clean.    - Continue IV vancomycin and Unasyn for now while inpatient  -Monitor renal  function and Vanco trough.  Last Vanco trough 14 on 9/13  -Anticipate a 10-day course of antibiotics from 9/12.  Stop date 9/22/2020  -Given his IV drug use, patient is not a candidate for home or outpatient IV antibiotics  -Anticipate discharging patient on p.o. Bactrim and Augmentin at discharge complete his antibiotic course  - Continue wound care  -Check hepatitis C antibody and viral load-ordered    Follow-up in ID clinic as needed.  Okay to discharge patient from ID standpoint.    Plan of care discussed with YVON Arellano M.D..  Signing off.    Leigh Rocha M.D.       Please note that this dictation was created using voice recognition software. I have worked with technical experts from [employer] [facility] to optimize the interface.  I have made every reasonable attempt to correct obvious errors, but there may be errors of grammar and possibly content that I did not discover before finalizing the note.

## 2020-09-16 ENCOUNTER — PATIENT OUTREACH (OUTPATIENT)
Dept: HEALTH INFORMATION MANAGEMENT | Facility: OTHER | Age: 30
End: 2020-09-16

## 2020-09-16 ENCOUNTER — PHARMACY VISIT (OUTPATIENT)
Dept: PHARMACY | Facility: MEDICAL CENTER | Age: 30
End: 2020-09-16
Payer: COMMERCIAL

## 2020-09-16 VITALS
SYSTOLIC BLOOD PRESSURE: 120 MMHG | TEMPERATURE: 97.2 F | WEIGHT: 146.61 LBS | OXYGEN SATURATION: 98 % | BODY MASS INDEX: 20.99 KG/M2 | RESPIRATION RATE: 16 BRPM | HEIGHT: 70 IN | DIASTOLIC BLOOD PRESSURE: 67 MMHG | HEART RATE: 61 BPM

## 2020-09-16 PROBLEM — L02.413 ABSCESS OF RIGHT SHOULDER: Status: RESOLVED | Noted: 2020-09-12 | Resolved: 2020-09-16

## 2020-09-16 PROBLEM — R73.9 HYPERGLYCEMIA: Status: RESOLVED | Noted: 2020-09-13 | Resolved: 2020-09-16

## 2020-09-16 LAB
BACTERIA BLD CULT: NORMAL
BACTERIA BLD CULT: NORMAL
BACTERIA TISS AEROBE CULT: ABNORMAL
BACTERIA TISS AEROBE CULT: ABNORMAL
BACTERIA WND AEROBE CULT: ABNORMAL
BACTERIA WND AEROBE CULT: ABNORMAL
GRAM STN SPEC: ABNORMAL
GRAM STN SPEC: ABNORMAL
SIGNIFICANT IND 70042: ABNORMAL
SIGNIFICANT IND 70042: ABNORMAL
SIGNIFICANT IND 70042: NORMAL
SIGNIFICANT IND 70042: NORMAL
SITE SITE: ABNORMAL
SITE SITE: ABNORMAL
SITE SITE: NORMAL
SITE SITE: NORMAL
SOURCE SOURCE: ABNORMAL
SOURCE SOURCE: ABNORMAL
SOURCE SOURCE: NORMAL
SOURCE SOURCE: NORMAL

## 2020-09-16 PROCEDURE — 700102 HCHG RX REV CODE 250 W/ 637 OVERRIDE(OP): Performed by: INTERNAL MEDICINE

## 2020-09-16 PROCEDURE — A9270 NON-COVERED ITEM OR SERVICE: HCPCS | Performed by: PHYSICIAN ASSISTANT

## 2020-09-16 PROCEDURE — 99239 HOSP IP/OBS DSCHRG MGMT >30: CPT | Performed by: INTERNAL MEDICINE

## 2020-09-16 PROCEDURE — 97535 SELF CARE MNGMENT TRAINING: CPT

## 2020-09-16 PROCEDURE — 700102 HCHG RX REV CODE 250 W/ 637 OVERRIDE(OP): Performed by: PHYSICIAN ASSISTANT

## 2020-09-16 PROCEDURE — RXMED WILLOW AMBULATORY MEDICATION CHARGE: Performed by: INTERNAL MEDICINE

## 2020-09-16 PROCEDURE — A9270 NON-COVERED ITEM OR SERVICE: HCPCS | Performed by: INTERNAL MEDICINE

## 2020-09-16 RX ORDER — AMOXICILLIN AND CLAVULANATE POTASSIUM 875; 125 MG/1; MG/1
1 TABLET, FILM COATED ORAL EVERY 12 HOURS
Qty: 14 TAB | Refills: 0 | Status: SHIPPED | OUTPATIENT
Start: 2020-09-16 | End: 2020-09-23

## 2020-09-16 RX ORDER — SULFAMETHOXAZOLE AND TRIMETHOPRIM 800; 160 MG/1; MG/1
1 TABLET ORAL EVERY 12 HOURS
Qty: 14 TAB | Refills: 0 | Status: SHIPPED | OUTPATIENT
Start: 2020-09-16 | End: 2020-09-23

## 2020-09-16 RX ADMIN — OXYCODONE HYDROCHLORIDE 10 MG: 5 TABLET ORAL at 07:40

## 2020-09-16 RX ADMIN — OXYCODONE HYDROCHLORIDE 10 MG: 5 TABLET ORAL at 10:57

## 2020-09-16 RX ADMIN — OXYCODONE HYDROCHLORIDE 10 MG: 5 TABLET ORAL at 01:48

## 2020-09-16 RX ADMIN — NICOTINE TRANSDERMAL SYSTEM 21 MG: 21 PATCH, EXTENDED RELEASE TRANSDERMAL at 07:40

## 2020-09-16 RX ADMIN — SULFAMETHOXAZOLE AND TRIMETHOPRIM 1 TABLET: 800; 160 TABLET ORAL at 04:50

## 2020-09-16 RX ADMIN — AMOXICILLIN AND CLAVULANATE POTASSIUM 1 TABLET: 875; 125 TABLET, FILM COATED ORAL at 04:50

## 2020-09-16 RX ADMIN — OXYCODONE HYDROCHLORIDE 10 MG: 5 TABLET ORAL at 04:51

## 2020-09-16 RX ADMIN — CITALOPRAM HYDROBROMIDE 20 MG: 20 TABLET ORAL at 04:51

## 2020-09-16 ASSESSMENT — PAIN DESCRIPTION - PAIN TYPE: TYPE: SURGICAL PAIN

## 2020-09-16 NOTE — PROGRESS NOTES
Pt being dc'd to HOME with Crutches.The following prescriptions given none; meds brought to pt at bedside. IV dc'd. Dc instructions discussed. All questions answered.  Patient agreeable to dc plan. Mahi Bedside RN to confirm that patient has all belongings at dc and that all home care needs have been arranged.

## 2020-09-16 NOTE — DISCHARGE INSTRUCTIONS
Discharge Instructions    Discharged to home by car with relative. Discharged via wheelchair, hospital escort: Yes.  Special equipment needed: Crutches    Be sure to schedule a follow-up appointment with your primary care doctor or any specialists as instructed.     Discharge Plan:   Diet Plan: Discussed  Activity Level: Discussed  Confirmed Follow up Appointment: Patient to Call and Schedule Appointment  Confirmed Symptoms Management: Discussed  Medication Reconciliation Updated: Yes    I understand that a diet low in cholesterol, fat, and sodium is recommended for good health. Unless I have been given specific instructions below for another diet, I accept this instruction as my diet prescription.   Other diet: As tolerated    Special Instructions:   Follow-up with your primary care physician, orthopedics team in the outpatient setting.   Take antibiotics Augmentin and Bactrim for 7 days as prescribed, please  at your pharmacy.  Prescriptions have been electronically prescribed.    Discussed medication changes with your nursing staff prior to discharge.   Return to the emergency department for fever, chills, worsening pain/tenderness at wound site, discharge including discharge of pus from wound site, worsening redness/warmth at the wound site.  Worsening swelling at the wound site, involved extremity.    · Is patient discharged on Warfarin / Coumadin?   No     Depression / Suicide Risk    As you are discharged from this [facility] facility, it is important to learn how to keep safe from harming yourself.    Recognize the warning signs:  · Abrupt changes in personality, positive or negative- including increase in energy   · Giving away possessions  · Change in eating patterns- significant weight changes-  positive or negative  · Change in sleeping patterns- unable to sleep or sleeping all the time   · Unwillingness or inability to communicate  · Depression  · Unusual sadness, discouragement and  loneliness  · Talk of wanting to die  · Neglect of personal appearance   · Rebelliousness- reckless behavior  · Withdrawal from people/activities they love  · Confusion- inability to concentrate     If you or a loved one observes any of these behaviors or has concerns about self-harm, here's what you can do:  · Talk about it- your feelings and reasons for harming yourself  · Remove any means that you might use to hurt yourself (examples: pills, rope, extension cords, firearm)  · Get professional help from the community (Mental Health, Substance Abuse, psychological counseling)  · Do not be alone:Call your Safe Contact- someone whom you trust who will be there for you.  · Call your local CRISIS HOTLINE 255-9250 or 592-300-4214  · Call your local Children's Mobile Crisis Response Team [place] (504) 160-7249 or www.[employer]  · Call the toll free National Suicide Prevention Hotlines   · National Suicide Prevention Lifeline 902-934-SCVE (5264)  · On[employer] Line Network 800-SUICIDE (086-7552)      Cellulitis, Adult    Cellulitis is a skin infection. The infected area is often warm, red, swollen, and sore. It occurs most often in the arms and lower legs. It is very important to get treated for this condition.  What are the causes?  This condition is caused by bacteria. The bacteria enter through a break in the skin, such as a cut, burn, insect bite, open sore, or crack.  What increases the risk?  This condition is more likely to occur in people who:  · Have a weak body defense system (immune system).  · Have open cuts, burns, bites, or scrapes on the skin.  · Are older than 60 years of age.  · Have a blood sugar problem (diabetes).  · Have a long-lasting (chronic) liver disease (cirrhosis) or kidney disease.  · Are very overweight (obese).  · Have a skin problem, such as:  ? Itchy rash (eczema).  ? Slow movement of blood in the veins (venous stasis).  ? Fluid buildup below the skin (edema).  · Have been  treated with high-energy rays (radiation).  · Use IV drugs.  What are the signs or symptoms?  Symptoms of this condition include:  · Skin that is:  ? Red.  ? Streaking.  ? Spotting.  ? Swollen.  ? Sore or painful when you touch it.  ? Warm.  · A fever.  · Chills.  · Blisters.  How is this diagnosed?  This condition is diagnosed based on:  · Medical history.  · Physical exam.  · Blood tests.  · Imaging tests.  How is this treated?  Treatment for this condition may include:  · Medicines to treat infections or allergies.  · Home care, such as:  ? Rest.  ? Placing cold or warm cloths (compresses) on the skin.  · Hospital care, if the condition is very bad.  Follow these instructions at home:  Medicines  · Take over-the-counter and prescription medicines only as told by your doctor.  · If you were prescribed an antibiotic medicine, take it as told by your doctor. Do not stop taking it even if you start to feel better.  General instructions    · Drink enough fluid to keep your pee (urine) pale yellow.  · Do not touch or rub the infected area.  · Raise (elevate) the infected area above the level of your heart while you are sitting or lying down.  · Place cold or warm cloths on the area as told by your doctor.  · Keep all follow-up visits as told by your doctor. This is important.  Contact a doctor if:  · You have a fever.  · You do not start to get better after 1-2 days of treatment.  · Your bone or joint under the infected area starts to hurt after the skin has healed.  · Your infection comes back. This can happen in the same area or another area.  · You have a swollen bump in the area.  · You have new symptoms.  · You feel ill and have muscle aches and pains.  Get help right away if:  · Your symptoms get worse.  · You feel very sleepy.  · You throw up (vomit) or have watery poop (diarrhea) for a long time.  · You see red streaks coming from the area.  · Your red area gets larger.  · Your red area turns dark in  color.  These symptoms may represent a serious problem that is an emergency. Do not wait to see if the symptoms will go away. Get medical help right away. Call your local emergency services (911 in the [place] Do not drive yourself to the hospital.  Summary  · Cellulitis is a skin infection. The area is often warm, red, swollen, and sore.  · This condition is treated with medicines, rest, and cold and warm cloths.  · Take all medicines only as told by your doctor.  · Tell your doctor if symptoms do not start to get better after 1-2 days of treatment.  This information is not intended to replace advice given to you by your health care provider. Make sure you discuss any questions you have with your health care provider.  Document Released: 06/05/2009 Document Revised: 05/09/2019 Document Reviewed: 05/09/2019  [employer] Patient Education © 2020 [employer]

## 2020-09-16 NOTE — DISCHARGE SUMMARY
Discharge Summary    CHIEF COMPLAINT ON ADMISSION  No chief complaint on file.      Reason for Admission  Right leg cellulitis     Admission Date  9/12/2020    CODE STATUS  Full Code    HPI & HOSPITAL COURSE  This is a 29 y.o. male here with Right leg cellulitis.     Patient with underlying ongoing IV drug abuse presented to [facility] on September 11, 2020 with right lower extremity pain and erythema and diagnosis of right lower extremity cellulitis with previous known history of abscesses, cultures positive for strep viridans.  CT imaging of the right lower extremity performed at the [facility] revealed that patient had extensive edema/induration of the subcutaneous fat circumferentially involving the lower leg.  Concern for underlying fasciitis versus myositis.  Given this patient was transferred to [facility] for orthopedics evaluation and consultation, subsequently taken to the operating room by Dr. Andrea on September 12, 2020 where patient underwent irrigation and debridement of complex right leg abscess deep to the fascia.  Subsequently patient admitted to the hospital here and maintained on intravenous antibiotics.  Cultures grew Streptococcus viridans.  Infectious disease team was consulted, patient continued on IV antibiotics during the hospitalization per infectious disease team recommendations.  Plan for transitioning to oral antibiotic therapy at the time of discharge.  Postoperative care including wound care and drain care provided by orthopedics team.  Prior to discharge drain removed.  Hepatitis C, HIV evaluation requested by infectious disease team but patient on September 15, 2028 refused any further blood draws, placement of peripheral IV lines.  If not facilitated, patient reported he would leave AGAINST MEDICAL ADVICE.  Patient was counseled and educated, benefits of ongoing IV antibiotics, lab draws discussed, or risks of this decision  including worsening infection discussed, patient verbalized understanding but refused.  Given this patient was transitioned to oral antibiotic therapy per ID team recommendations.    At this time patient is eager to discharge home, feels his baseline.  He has been transitioned to oral Augmentin and Bactrim per ID team recommendations, plan to continue this for another 1 week.  Electronic prescriptions provided.  Patient informed to  prescriptions following discharge.  Patient is advised to maintain close outpatient follow-up with PCP and orthopedics team and to return to the ER for any fever, chills, worsening drainage/purulence from the wound site.  Worsening swelling/erythema/tenderness or warmth at the wound site.  Patient verbalized understanding.  No further acute hospitalization needs, hospital schedulers have been personally informed by me to arrange outpatient follow-up with PCP for this patient.  During the hospitalization patient was extensively counseled and educated regarding cessation of intravenous drug abuse, polysubstance abuse.  Benefits of cessation, risk of ongoing abuse including death has been discussed with the patient and he verbalized understanding.    Patient at this time is being discharged home in stable condition with plan for close outpatient follow-up.      Therefore, he is discharged in good and stable condition to home with close outpatient follow-up.    The patient met 2-midnight criteria for an inpatient stay at the time of discharge.    Discharge Date  09/16/20    FOLLOW UP ITEMS POST DISCHARGE  Follow-up with your primary care physician, orthopedics team in the outpatient setting.  Take antibiotics Augmentin and Bactrim for 7 days as prescribed, please  at your pharmacy.  Prescriptions have been electronically prescribed.  Discussed medication changes with your nursing staff prior to discharge.  Return to the emergency department for fever, chills, worsening  pain/tenderness at wound site, discharge including discharge of pus from wound site, worsening redness/warmth at the wound site.  Worsening swelling at the wound site, involved extremity.    DISCHARGE DIAGNOSES  Principal Problem:    Cellulitis of right leg, IVDU to that area, recent S. viridian abscess of R shoulder POA: Yes  Active Problems:    History of substance abuse (HCC) POA: Yes    Anxiety POA: Yes  Resolved Problems:    Hyperglycemia POA: Yes    Abscess of right shoulder POA: Yes      FOLLOW UP  No future appointments.  No follow-up provider specified.    MEDICATIONS ON DISCHARGE     Medication List      START taking these medications      Instructions   amoxicillin-clavulanate 875-125 MG Tabs  Commonly known as: AUGMENTIN   Take 1 Tab by mouth every 12 hours for 7 days.  Dose: 1 Tab     sulfamethoxazole-trimethoprim 800-160 MG tablet  Commonly known as: BACTRIM DS   Take 1 Tab by mouth every 12 hours for 7 days.  Dose: 1 Tab        CONTINUE taking these medications      Instructions   buprenorphine-naloxone 8-2 mg Subl SL  Commonly known as: SUBOXONE   Doctor's comments: NADEAN:   Place 1 Tab under tongue every day. Pt takes 12 mg 1 time a day  Dose: 1 Tab     citalopram 20 MG Tabs  Commonly known as: CELEXA   Take 1 Tab by mouth every day.  Dose: 20 mg     ibuprofen 800 MG Tabs  Commonly known as: MOTRIN   Doctor's comments: Take with food  Take 1 Tab by mouth every 8 hours as needed for Moderate Pain or Inflammation.  Dose: 800 mg        STOP taking these medications    clindamycin 300 MG Caps  Commonly known as: CLEOCIN            Allergies  No Known Allergies    DIET  Orders Placed This Encounter   Procedures   • Diet Order Regular     Standing Status:   Standing     Number of Occurrences:   1     Order Specific Question:   Diet:     Answer:   Regular [1]       ACTIVITY  As tolerated.  Weight bearing as tolerated    CONSULTATIONS  Orthopoedics  IDS    PROCEDURES  As noted     LABORATORY  Lab Results    Component Value Date    SODIUM 139 09/15/2020    POTASSIUM 4.2 09/15/2020    CHLORIDE 98 09/15/2020    CO2 27 09/15/2020    GLUCOSE 92 09/15/2020    BUN 16 09/15/2020    CREATININE 0.78 09/15/2020        Lab Results   Component Value Date    WBC 7.8 09/15/2020    HEMOGLOBIN 12.9 (L) 09/15/2020    HEMATOCRIT 39.0 (L) 09/15/2020    PLATELETCT 251 09/15/2020        Total time of the discharge process exceeds 33 minutes.

## 2020-09-16 NOTE — DISCHARGE PLANNING
Meds-to-Beds: Discharge prescription orders listed below delivered to patient's bedside. RN notified. Patient counseled.       Luther Forrester   Home Medication Instructions VIELKA:[id number]    Printed on:09/16/20 1301   Medication Information                      amoxicillin-clavulanate (AUGMENTIN) 875-125 MG Tab  Take 1 Tab by mouth every 12 hours for 7 days.             sulfamethoxazole-trimethoprim (BACTRIM DS) 800-160 MG tablet  Take 1 Tab by mouth every 12 hours for 7 days.               Ariadne Hernandez, PharmD

## 2020-09-16 NOTE — CARE PLAN
Problem: Communication  Goal: The ability to communicate needs accurately and effectively will improve  Outcome: PROGRESSING AS EXPECTED   Updated on POC  Problem: Safety  Goal: Will remain free from injury  Outcome: PROGRESSING AS EXPECTED   Pt uses call light appropriately   Problem: Pain Management  Goal: Pain level will decrease to patient's comfort goal  Outcome: PROGRESSING AS EXPECTED   Pain managed with PRN oxycodone

## 2020-09-16 NOTE — CARE PLAN
Problem: Communication  Goal: The ability to communicate needs accurately and effectively will improve  Outcome: PROGRESSING AS EXPECTED     Problem: Safety  Goal: Will remain free from injury  Outcome: PROGRESSING AS EXPECTED  Goal: Will remain free from falls  Outcome: PROGRESSING AS EXPECTED     Problem: Infection  Goal: Will remain free from infection  Outcome: PROGRESSING AS EXPECTED     Problem: Bowel/Gastric:  Goal: Normal bowel function is maintained or improved  Outcome: PROGRESSING AS EXPECTED     Problem: Pain Management  Goal: Pain level will decrease to patient's comfort goal  Outcome: PROGRESSING AS EXPECTED     Problem: Respiratory:  Goal: Respiratory status will improve  Outcome: PROGRESSING AS EXPECTED

## 2020-09-16 NOTE — PROGRESS NOTES
"   Orthopaedic Progress Note    Interval changes:  Patient doing well   Drain out yesterday  Cleared for DC by ortho pending medicine clearance    ROS - Patient denies any new issues except per above.       /67   Pulse 61   Temp 36.2 °C (97.2 °F) (Temporal)   Resp 16   Ht 1.778 m (5' 10\")   Wt 66.5 kg (146 lb 9.7 oz)   SpO2 98%     Patient seen and examined  No acute distress  Breathing non labored  RRR  RLE dressings CDI, DNVI, JANEY in place with serosanguinous exudate, moves all toes, cap refill <2 sec.     Recent Labs     09/14/20  0915 09/15/20  0904   WBC 14.7* 7.8   RBC 4.05* 4.59*   HEMOGLOBIN 11.5* 12.9*   HEMATOCRIT 34.3* 39.0*   MCV 84.7 85.0   MCH 28.4 28.1   MCHC 33.5* 33.1*   RDW 38.1 37.7   PLATELETCT 225 251   MPV 9.8 9.7       Active Hospital Problems    Diagnosis   • Cellulitis of right leg, IVDU to that area, recent S. viridian abscess of R shoulder [L03.115]     Priority: High   • History of substance abuse (HCC) [F19.11]     Priority: Medium   • Abscess of right shoulder [L02.413]     Priority: Low   • Hyperglycemia [R73.9]   • Anxiety [F41.9]     Assessment/Plan:  Patient doing well    POD#4 S/P Incision and drainage of complex right leg abscess deep to fascia.  Wt bearing status - WBAT  Wound care/Drains - dressing left in place  Future Procedures - none planned   Lovenox: Start 9/14, Duration-until ambulatory > 150'  Sutures/Staples out- 10-14 days post operatively  PT/OT-initiated  Antibiotics:augmentin, bactrim  DVT Prophylaxis- TEDS/SCDs/Foot pumps  Riddle-none  Case Coordination for Discharge Planning - Disposition pending abx needs     "

## 2020-09-16 NOTE — PROGRESS NOTES
Patient refused Hep C lab draw, patient requested his IVs be taken out because he would rather use oral abx.  Patient also requested a nicotine patch, placed today on right upper arm.  Pulled drain today using asceptic technique, patient tolerated ok. Patient safe as of shift change.

## 2020-09-16 NOTE — PROGRESS NOTES
Bedside Report received   Assumed care of patient at 1924.    Pt is A&O x4  RA denies nausea, SOB, dizziness or chest pain.  Pain reported at 7/10. PRN oxycodone given  Tolerating Regular Diet   Surgical dressing to right leg c/d/i  + Urine output  Last BM 9/15, per pt   + Flatus  Up x1 with walker or crutches   SCD's off  Bed in lowest position and locked.  Pt resting comfortably now.  Review plan of care with patient  Call light within reach  Hourly rounds in place  All needs met at this time

## 2020-09-17 ENCOUNTER — PATIENT OUTREACH (OUTPATIENT)
Dept: HEALTH INFORMATION MANAGEMENT | Facility: OTHER | Age: 30
End: 2020-09-17

## 2020-09-17 NOTE — THERAPY
Physical Therapy   Daily Treatment     Patient Name: Luther Forrester  Age:  29 y.o., Sex:  male  Medical Record #: [id number]  Today's Date: 9/16/2020     Precautions:  Fall Risk, Weight Bearing As Tolerated Right Lower Extremity    Assessment    Pt demonstrating functional mobility required to return home; seen to give HEP for knee/ankle for optimal soft tissue recovery. No further acute PT needs identified.     Plan    Discharge secondary to goals met.    DC Equipment Recommendations: Crutches (already delivered)   Discharge Recommendations: Recommend home health for continued physical therapy services      Subjective/Objective       09/16/20 1025   Cognition    Cognition / Consciousness WDL   Level of Consciousness Alert   Comments pleasant and coopeartive; edu receptive with mother in room;    Sensation Lower Body   Lower Extremity Sensation   WDL   Sitting Lower Body Exercises   Sitting Lower Body Exercises Yes   Other Exercises HEP: toe towel curls 2 x 20 BID; DF functional positioning; ice bathing at mid calf    Balance   Sitting Balance (Static) Good   Sitting Balance (Dynamic) Good   Standing Balance (Static) Fair +   Standing Balance (Dynamic) Fair   Weight Shift Sitting Good   Weight Shift Standing Poor   Skilled Intervention Postural Facilitation;Sequencing;Tactile Cuing;Verbal Cuing   Comments B UE support in sitting/standing; observed in room with RN    Interdisciplinary Plan of Care Collaboration   IDT Collaboration with  Nursing   Patient Position at End of Therapy   (up for dc )   Collaboration Comments re: outcome of session

## 2020-09-18 LAB
BACTERIA SPEC ANAEROBE CULT: NORMAL
BACTERIA SPEC ANAEROBE CULT: NORMAL
SIGNIFICANT IND 70042: NORMAL
SIGNIFICANT IND 70042: NORMAL
SITE SITE: NORMAL
SITE SITE: NORMAL
SOURCE SOURCE: NORMAL
SOURCE SOURCE: NORMAL

## 2020-09-22 NOTE — PROGRESS NOTES
Pt did mention that he know that he needs to establish with a PCP. Pt states that he has already called Dr. Andrea and left a message to schedule a hospital follow up appt.

## 2020-09-25 ENCOUNTER — PATIENT OUTREACH (OUTPATIENT)
Dept: HEALTH INFORMATION MANAGEMENT | Facility: OTHER | Age: 30
End: 2020-09-25

## 2020-11-15 ENCOUNTER — APPOINTMENT (OUTPATIENT)
Dept: RADIOLOGY | Facility: MEDICAL CENTER | Age: 30
End: 2020-11-15
Payer: MEDICAID

## 2020-11-15 ENCOUNTER — HOSPITAL ENCOUNTER (EMERGENCY)
Facility: MEDICAL CENTER | Age: 30
End: 2020-11-15
Payer: MEDICAID

## 2020-11-15 VITALS
SYSTOLIC BLOOD PRESSURE: 139 MMHG | RESPIRATION RATE: 20 BRPM | DIASTOLIC BLOOD PRESSURE: 85 MMHG | HEIGHT: 70 IN | TEMPERATURE: 97.5 F | BODY MASS INDEX: 21.15 KG/M2 | WEIGHT: 147.71 LBS | OXYGEN SATURATION: 100 % | HEART RATE: 120 BPM

## 2020-11-15 LAB — EKG IMPRESSION: NORMAL

## 2020-11-15 PROCEDURE — 93005 ELECTROCARDIOGRAM TRACING: CPT

## 2020-11-15 PROCEDURE — 302449 STATCHG TRIAGE ONLY (STATISTIC)

## 2020-11-15 ASSESSMENT — FIBROSIS 4 INDEX: FIB4 SCORE: 0.42

## 2020-11-16 NOTE — ED TRIAGE NOTES
"Pt admits to a history of heroin and cocaine use, with the last use yesterday.  He C/O acute onset of central chest pain referred to his left arm recurring for the past 30 minutes.   Chief Complaint   Patient presents with   • Chest Pain   • Shortness of Breath   • Dizziness   • Arm Pain     /85   Pulse (!) 120   Temp 36.4 °C (97.5 °F) (Temporal)   Resp 20   Ht 1.778 m (5' 10\")   Wt 67 kg (147 lb 11.3 oz)   SpO2 100%   BMI 21.19 kg/m²      "

## 2021-06-06 ENCOUNTER — APPOINTMENT (OUTPATIENT)
Dept: RADIOLOGY | Facility: MEDICAL CENTER | Age: 31
End: 2021-06-06
Attending: EMERGENCY MEDICINE
Payer: MEDICAID

## 2021-06-06 ENCOUNTER — APPOINTMENT (OUTPATIENT)
Dept: URGENT CARE | Facility: CLINIC | Age: 31
End: 2021-06-06
Payer: MEDICAID

## 2021-06-06 ENCOUNTER — HOSPITAL ENCOUNTER (EMERGENCY)
Facility: MEDICAL CENTER | Age: 31
End: 2021-06-06
Attending: EMERGENCY MEDICINE
Payer: MEDICAID

## 2021-06-06 VITALS
DIASTOLIC BLOOD PRESSURE: 71 MMHG | TEMPERATURE: 97.3 F | OXYGEN SATURATION: 98 % | RESPIRATION RATE: 20 BRPM | HEART RATE: 74 BPM | HEIGHT: 70 IN | SYSTOLIC BLOOD PRESSURE: 140 MMHG | WEIGHT: 156.53 LBS | BODY MASS INDEX: 22.41 KG/M2

## 2021-06-06 DIAGNOSIS — R60.9 PERIPHERAL EDEMA: ICD-10-CM

## 2021-06-06 DIAGNOSIS — L03.115 CELLULITIS OF RIGHT LOWER EXTREMITY: ICD-10-CM

## 2021-06-06 LAB
ALBUMIN SERPL BCP-MCNC: 4.9 G/DL (ref 3.2–4.9)
ALBUMIN/GLOB SERPL: 1.5 G/DL
ALP SERPL-CCNC: 80 U/L (ref 30–99)
ALT SERPL-CCNC: 138 U/L (ref 2–50)
ANION GAP SERPL CALC-SCNC: 9 MMOL/L (ref 7–16)
AST SERPL-CCNC: 64 U/L (ref 12–45)
BASOPHILS # BLD AUTO: 0.4 % (ref 0–1.8)
BASOPHILS # BLD: 0.02 K/UL (ref 0–0.12)
BILIRUB SERPL-MCNC: 0.3 MG/DL (ref 0.1–1.5)
BUN SERPL-MCNC: 11 MG/DL (ref 8–22)
CALCIUM SERPL-MCNC: 9.6 MG/DL (ref 8.4–10.2)
CHLORIDE SERPL-SCNC: 102 MMOL/L (ref 96–112)
CK SERPL-CCNC: 196 U/L (ref 0–154)
CO2 SERPL-SCNC: 27 MMOL/L (ref 20–33)
CREAT SERPL-MCNC: 0.98 MG/DL (ref 0.5–1.4)
CRP SERPL HS-MCNC: <0.3 MG/DL (ref 0–0.75)
D DIMER PPP IA.FEU-MCNC: <0.27 UG/ML (FEU) (ref 0–0.5)
EKG IMPRESSION: NORMAL
EOSINOPHIL # BLD AUTO: 0.04 K/UL (ref 0–0.51)
EOSINOPHIL NFR BLD: 0.8 % (ref 0–6.9)
ERYTHROCYTE [DISTWIDTH] IN BLOOD BY AUTOMATED COUNT: 37.5 FL (ref 35.9–50)
ERYTHROCYTE [SEDIMENTATION RATE] IN BLOOD BY WESTERGREN METHOD: 11 MM/HOUR (ref 0–15)
GLOBULIN SER CALC-MCNC: 3.3 G/DL (ref 1.9–3.5)
GLUCOSE SERPL-MCNC: 93 MG/DL (ref 65–99)
HCT VFR BLD AUTO: 44.2 % (ref 42–52)
HGB BLD-MCNC: 14.5 G/DL (ref 14–18)
IMM GRANULOCYTES # BLD AUTO: 0.01 K/UL (ref 0–0.11)
IMM GRANULOCYTES NFR BLD AUTO: 0.2 % (ref 0–0.9)
LACTATE BLD-SCNC: 1.1 MMOL/L (ref 0.5–2)
LIPASE SERPL-CCNC: 23 U/L (ref 7–58)
LYMPHOCYTES # BLD AUTO: 1.07 K/UL (ref 1–4.8)
LYMPHOCYTES NFR BLD: 21.8 % (ref 22–41)
MCH RBC QN AUTO: 28.3 PG (ref 27–33)
MCHC RBC AUTO-ENTMCNC: 32.8 G/DL (ref 33.7–35.3)
MCV RBC AUTO: 86.2 FL (ref 81.4–97.8)
MONOCYTES # BLD AUTO: 0.42 K/UL (ref 0–0.85)
MONOCYTES NFR BLD AUTO: 8.6 % (ref 0–13.4)
NEUTROPHILS # BLD AUTO: 3.34 K/UL (ref 1.82–7.42)
NEUTROPHILS NFR BLD: 68.2 % (ref 44–72)
NRBC # BLD AUTO: 0 K/UL
NRBC BLD-RTO: 0 /100 WBC
NT-PROBNP SERPL IA-MCNC: 89 PG/ML (ref 0–125)
PLATELET # BLD AUTO: 177 K/UL (ref 164–446)
PMV BLD AUTO: 9.4 FL (ref 9–12.9)
POTASSIUM SERPL-SCNC: 3.7 MMOL/L (ref 3.6–5.5)
PROT SERPL-MCNC: 8.2 G/DL (ref 6–8.2)
RBC # BLD AUTO: 5.13 M/UL (ref 4.7–6.1)
SODIUM SERPL-SCNC: 138 MMOL/L (ref 135–145)
TROPONIN T SERPL-MCNC: 11 NG/L (ref 6–19)
TSH SERPL DL<=0.005 MIU/L-ACNC: 0.57 UIU/ML (ref 0.38–5.33)
WBC # BLD AUTO: 4.9 K/UL (ref 4.8–10.8)

## 2021-06-06 PROCEDURE — 96365 THER/PROPH/DIAG IV INF INIT: CPT

## 2021-06-06 PROCEDURE — 84443 ASSAY THYROID STIM HORMONE: CPT

## 2021-06-06 PROCEDURE — 99285 EMERGENCY DEPT VISIT HI MDM: CPT

## 2021-06-06 PROCEDURE — 85652 RBC SED RATE AUTOMATED: CPT

## 2021-06-06 PROCEDURE — 83880 ASSAY OF NATRIURETIC PEPTIDE: CPT

## 2021-06-06 PROCEDURE — 84484 ASSAY OF TROPONIN QUANT: CPT

## 2021-06-06 PROCEDURE — 82550 ASSAY OF CK (CPK): CPT

## 2021-06-06 PROCEDURE — 85025 COMPLETE CBC W/AUTO DIFF WBC: CPT

## 2021-06-06 PROCEDURE — 93970 EXTREMITY STUDY: CPT

## 2021-06-06 PROCEDURE — 80053 COMPREHEN METABOLIC PANEL: CPT

## 2021-06-06 PROCEDURE — 83605 ASSAY OF LACTIC ACID: CPT

## 2021-06-06 PROCEDURE — 700102 HCHG RX REV CODE 250 W/ 637 OVERRIDE(OP): Performed by: EMERGENCY MEDICINE

## 2021-06-06 PROCEDURE — 93005 ELECTROCARDIOGRAM TRACING: CPT | Performed by: EMERGENCY MEDICINE

## 2021-06-06 PROCEDURE — 85379 FIBRIN DEGRADATION QUANT: CPT

## 2021-06-06 PROCEDURE — 700111 HCHG RX REV CODE 636 W/ 250 OVERRIDE (IP): Performed by: EMERGENCY MEDICINE

## 2021-06-06 PROCEDURE — 83690 ASSAY OF LIPASE: CPT

## 2021-06-06 PROCEDURE — 36415 COLL VENOUS BLD VENIPUNCTURE: CPT

## 2021-06-06 PROCEDURE — A9270 NON-COVERED ITEM OR SERVICE: HCPCS | Performed by: EMERGENCY MEDICINE

## 2021-06-06 PROCEDURE — 700105 HCHG RX REV CODE 258: Performed by: EMERGENCY MEDICINE

## 2021-06-06 PROCEDURE — 86140 C-REACTIVE PROTEIN: CPT

## 2021-06-06 PROCEDURE — 87040 BLOOD CULTURE FOR BACTERIA: CPT | Mod: 91

## 2021-06-06 RX ORDER — AMOXICILLIN AND CLAVULANATE POTASSIUM 875; 125 MG/1; MG/1
1 TABLET, FILM COATED ORAL ONCE
Status: COMPLETED | OUTPATIENT
Start: 2021-06-06 | End: 2021-06-06

## 2021-06-06 RX ORDER — AMOXICILLIN AND CLAVULANATE POTASSIUM 875; 125 MG/1; MG/1
1 TABLET, FILM COATED ORAL 2 TIMES DAILY
Qty: 14 TABLET | Refills: 0 | Status: SHIPPED | OUTPATIENT
Start: 2021-06-06 | End: 2021-06-13

## 2021-06-06 RX ADMIN — AMOXICILLIN AND CLAVULANATE POTASSIUM 1 TABLET: 875; 125 TABLET, FILM COATED ORAL at 20:40

## 2021-06-06 RX ADMIN — SODIUM CHLORIDE 3 G: 900 INJECTION INTRAVENOUS at 19:22

## 2021-06-06 ASSESSMENT — FIBROSIS 4 INDEX: FIB4 SCORE: 0.42

## 2021-06-06 NOTE — ED TRIAGE NOTES
"Presents with a hx of IV drug abuse. He was treated   In our facility on September 11, 2020 with right lower extremity pain and escalating erythema.  He was diagnosed with a right lower extremity cellulitis with previous well known history of abscesses, and cultures positive for strep viridans.  He returns today complaining of same extremity painful swelling recurring for the past 3 days.   Chief Complaint   Patient presents with   • Leg Swelling     /78   Pulse 95   Temp 36.3 °C (97.3 °F) (Temporal)   Resp 20   Ht 1.778 m (5' 10\")   Wt 71 kg (156 lb 8.4 oz)   SpO2 99%   BMI 22.46 kg/m²      "

## 2021-06-07 NOTE — DISCHARGE INSTRUCTIONS
Return to the emergency department in 48 hours for recheck.  Return sooner for increasing pain, swelling, redness, fever or other concerns.  Avoid IV drugs.  Take antibiotics as prescribed.  Follow-up with your primary care doctor or the [facility].  Your liver function tests were mildly elevated follow-up with your doctor.

## 2021-06-07 NOTE — ED NOTES
Report received from Yumi COPELAND.  Assumed care of pt. AIDET done; white board updated. Pt up ambulated to BR-steady on feet.

## 2021-06-07 NOTE — ED NOTES
MD at bedside for recheck, POC and results discussed. Pt discharged with instructions and script. Pt told to return if worse or fever and to follow up with [facility].   Pt verbalized understanding of discharge instructions.  Pt ambulated out of ED with mother

## 2021-06-07 NOTE — ED PROVIDER NOTES
ED Provider Note    CHIEF COMPLAINT  Chief Complaint   Patient presents with   • Leg Swelling       [facility]  Luther Pop Forrester is a 30 y.o. male who presents to the emergency department complaining of bilateral lower extremity swelling.  The patient has a history of cellulitis in the right lower extremity with an abscess incision and drainage.  This is from an IM injection of heroin.  This infection was in September of last year.  Over the last several days she developed bilateral lower extremity swelling.  This started on the right side and is becoming more severe on the right side.  It is present bilaterally.  The right leg is also erythematous and hot.  He denies any falls or trauma.  Denies any history of PE or DVT.  Denies any fevers or chills.  Last intravenous drug use was 4 days ago.  He denies any orthopnea cough or other symptoms.  Denies any other aggravating alleviating factors.    REVIEW OF SYSTEMS  See HPI for further details. All other systems are negative.    PAST MEDICAL HISTORY  History reviewed. No pertinent past medical history.    FAMILY HISTORY  History reviewed. No pertinent family history.    SOCIAL HISTORY  Social History     Socioeconomic History   • Marital status: Single     Spouse name: Not on file   • Number of children: Not on file   • Years of education: Not on file   • Highest education level: Not on file   Occupational History   • Not on file   Tobacco Use   • Smoking status: Current Every Day Smoker     Packs/day: 0.50   • Smokeless tobacco: Former User     Types: Chew     Quit date: 5/9/2018   Substance and Sexual Activity   • Alcohol use: No   • Drug use: Yes     Types: IV, Methamphetamines, Intravenous     Comment: heroin/meth   • Sexual activity: Not on file   Other Topics Concern   • Not on file   Social History Narrative   • Not on file     Social Determinants of Health     Financial Resource Strain:    • Difficulty of Paying Living Expenses:    Food Insecurity:    •  "Worried About Running Out of Food in the Last Year:    • Ran Out of Food in the Last Year:    Transportation Needs:    • Lack of Transportation (Medical):    • Lack of Transportation (Non-Medical):    Physical Activity:    • Days of Exercise per Week:    • Minutes of Exercise per Session:    Stress:    • Feeling of Stress :    Social Connections:    • Frequency of Communication with Friends and Family:    • Frequency of Social Gatherings with Friends and Family:    • Attends Christian Services:    • Active Member of Clubs or Organizations:    • Attends Club or Organization Meetings:    • Marital Status:    Intimate Partner Violence:    • Fear of Current or Ex-Partner:    • Emotionally Abused:    • Physically Abused:    • Sexually Abused:        SURGICAL HISTORY  Past Surgical History:   Procedure Laterality Date   • IRRIGATION & DEBRIDEMENT ORTHO Right 9/12/2020    Procedure: IRRIGATION AND DEBRIDEMENT, WOUND;  Surgeon: Brian Andrea M.D.;  Location: SURGERY [facility];  Service: Orthopedics   • DENTAL EXTRACTION(S)      wisdom teeth   • TONSILLECTOMY         CURRENT MEDICATIONS  Home Medications    **Home medications have not yet been reviewed for this encounter**         ALLERGIES  No Known Allergies    PHYSICAL EXAM  VITAL SIGNS: /78   Pulse 95   Temp 36.3 °C (97.3 °F) (Temporal)   Resp 20   Ht 1.778 m (5' 10\")   Wt 71 kg (156 lb 8.4 oz)   SpO2 99%   BMI 22.46 kg/m²    Constitutional: Awake alert nontoxic no acute distress  HENT: Normocephalic, Atraumatic  Eyes: PERRL, EOMI,  Neck: Normal range of motion, No tenderness, Supple, No stridor.     Cardiovascular: Normal heart rate, Normal rhythm, No murmurs, No rubs, No gallops.   Thorax & Lungs: Normal breath sounds, No respiratory distress, No wheezing, No chest tenderness.   Abdomen: Bowel sounds normal, Soft, No tenderness  Skin: Warm, Dry, No erythema, No rash.   Musculoskeletal: Good range of motion in all major joints.  Bilateral lower " extremity edema more significant on the right than left.  Right lower extremity erythematous and hot to the touch.  Palpable pulses are present bilaterally.  Neurologic: Alert, No focal deficits noted.   Psychiatric: Affect normal    Results for orders placed or performed during the hospital encounter of 06/06/21   CBC WITH DIFFERENTIAL   Result Value Ref Range    WBC 4.9 4.8 - 10.8 K/uL    RBC 5.13 4.70 - 6.10 M/uL    Hemoglobin 14.5 14.0 - 18.0 g/dL    Hematocrit 44.2 42.0 - 52.0 %    MCV 86.2 81.4 - 97.8 fL    MCH 28.3 27.0 - 33.0 pg    MCHC 32.8 (L) 33.7 - 35.3 g/dL    RDW 37.5 35.9 - 50.0 fL    Platelet Count 177 164 - 446 K/uL    MPV 9.4 9.0 - 12.9 fL    Neutrophils-Polys 68.20 44.00 - 72.00 %    Lymphocytes 21.80 (L) 22.00 - 41.00 %    Monocytes 8.60 0.00 - 13.40 %    Eosinophils 0.80 0.00 - 6.90 %    Basophils 0.40 0.00 - 1.80 %    Immature Granulocytes 0.20 0.00 - 0.90 %    Nucleated RBC 0.00 /100 WBC    Neutrophils (Absolute) 3.34 1.82 - 7.42 K/uL    Lymphs (Absolute) 1.07 1.00 - 4.80 K/uL    Monos (Absolute) 0.42 0.00 - 0.85 K/uL    Eos (Absolute) 0.04 0.00 - 0.51 K/uL    Baso (Absolute) 0.02 0.00 - 0.12 K/uL    Immature Granulocytes (abs) 0.01 0.00 - 0.11 K/uL    NRBC (Absolute) 0.00 K/uL   COMP METABOLIC PANEL   Result Value Ref Range    Sodium 138 135 - 145 mmol/L    Potassium 3.7 3.6 - 5.5 mmol/L    Chloride 102 96 - 112 mmol/L    Co2 27 20 - 33 mmol/L    Anion Gap 9.0 7.0 - 16.0    Glucose 93 65 - 99 mg/dL    Bun 11 8 - 22 mg/dL    Creatinine 0.98 0.50 - 1.40 mg/dL    Calcium 9.6 8.4 - 10.2 mg/dL    AST(SGOT) 64 (H) 12 - 45 U/L    ALT(SGPT) 138 (H) 2 - 50 U/L    Alkaline Phosphatase 80 30 - 99 U/L    Total Bilirubin 0.3 0.1 - 1.5 mg/dL    Albumin 4.9 3.2 - 4.9 g/dL    Total Protein 8.2 6.0 - 8.2 g/dL    Globulin 3.3 1.9 - 3.5 g/dL    A-G Ratio 1.5 g/dL   LIPASE   Result Value Ref Range    Lipase 23 7 - 58 U/L   proBrain Natriuretic Peptide, NT   Result Value Ref Range    NT-proBNP 89 0 - 125 pg/mL    TROPONIN   Result Value Ref Range    Troponin T 11 6 - 19 ng/L   TSH   Result Value Ref Range    TSH 0.568 0.380 - 5.330 uIU/mL   D-DIMER   Result Value Ref Range    D-Dimer Screen <0.27 0.00 - 0.50 ug/mL (FEU)   CRP QUANTITIVE (NON-CARDIAC)   Result Value Ref Range    Stat C-Reactive Protein <0.30 0.00 - 0.75 mg/dL   Sed Rate   Result Value Ref Range    Sed Rate Westergren 11 0 - 15 mm/hour   LACTIC ACID   Result Value Ref Range    Lactic Acid 1.1 0.5 - 2.0 mmol/L   ESTIMATED GFR   Result Value Ref Range    GFR If African American >60 >60 mL/min/1.73 m 2    GFR If Non African American >60 >60 mL/min/1.73 m 2   CREATINE KINASE   Result Value Ref Range    CPK Total 196 (H) 0 - 154 U/L   EKG   Result Value Ref Range    Report       [facility] Emergency Dept.    Test Date:  2021  Pt Name:    GENESIS MESSINA            Department: [facility]  MRN:        [id number]                      [facility]   Gender:     Male                         Technician:   :        1990                   Requested By:BRANDON CRANE  Order #:    [id number]                    Reading MD: BRANDON CRANE. BEE    Measurements  Intervals                                Axis  Rate:       94                           P:          61  NJ:         116                          QRS:        69  QRSD:       92                           T:          52  QT:         335  QTc:        419    Interpretive Statements  Sinus rhythm  Borderline short NJ interval  Compared to ECG 11/15/2020 17:27:34  Sinus tachycardia no longer present  Electronically Signed On 2021 17:48:24 PDT by BRANDON CRANE. BEE          RADIOLOGY/PROCEDURES  US-EXTREMITY VENOUS LOWER BILAT   Final Result      No evidence of right or left lower extremity deep venous thrombosis.            COURSE & MEDICAL DECISION MAKING  Pertinent Labs & Imaging studies reviewed. (See chart for details)    Patient presents with bilateral lower extremity  edema right greater than left, and mild right lower extremity erythema.    The patient reports a history of IV drug use although states he does not use for 4 days.  He is not shot into either lower extremity for more than several months.    Visual diagnosis for his edema is a typical diagnosis for edema including but not limited to DVT, renal failure, liver failure, fluid overload, heart failure, thyroid disease to name a few.    He is worked with labs for above differential diagnosis.  He has mild elevated LFTs.  This requires further work-up as an outpatient can see his doctor for this.  No signs of renal failure or liver failure.  No signs of heart failure or thyroid dysfunction.  D-dimer is negative ultrasound is negative.    At this point we will proceed with work-up for edema as an outpatient.  Did a CPK.  He denies passing out or laying on his legs for a long time.  Specifically his right lower extremity is no focal tenderness or swelling or induration.  It is mildly red and mildly hot.  There is no signs of injection or abscess.  There is no crepitus or signs of deep infection.    With normal labs, no pain in the muscles and a fairly reassuring examination I do not think he requires any advanced imaging and I do not think he is likely to have a deep abscess or necrotizing  fasciitis or deep abscess.    The patient is given IV Unasyn for possible cellulitis.  Discussed with the pharmacy does not require MRSA without open wound.  Is carefully examined there is no open wound.  He has no stigmata of endocarditis there is no murmur or lesions on his hands or feet.    He is given a dose of IV Unasyn.  He was sent home with 7 days of Augmentin.  No indication for MRSA coverage without an open wound per my discussion with the pharmacist.    The patient be asked return in 48 hours for recheck.  Sooner for increasing pain, redness, swelling, fever or other concerns.  Made aware of his elevated LFTs and need for  follow-up with primary care.  He should see his doctor or follow-up to [facility].  Questions are answered is agreed with plan and discharged in good condition.    The patient was noted to have elevated blood pressure while in the ER and was counseled to see their doctor within one wee to have this rechecked.      Rx Augmentin    [address] [id number] [phone]            FINAL IMPRESSION  1. Peripheral edema     2. Cellulitis of right lower extremity         2.   3.         Electronically signed by: Jim Santiago M.D., 6/6/2021 5:36 PM

## 2021-06-07 NOTE — DISCHARGE PLANNING
Anticipated Discharge Disposition: Unknown    Action: He does not appear to have connected to PCP since last visit. Please refer to [facility] before Discharge to establish PCP. Complex past medical hx/    Barriers to Discharge: Unkown at present    Plan: Please refer to [facility].

## 2021-06-11 LAB
BACTERIA BLD CULT: NORMAL
SIGNIFICANT IND 70042: NORMAL
SITE SITE: NORMAL
SOURCE SOURCE: NORMAL

## 2021-06-12 LAB
BACTERIA BLD CULT: NORMAL
SIGNIFICANT IND 70042: NORMAL
SITE SITE: NORMAL
SOURCE SOURCE: NORMAL

## 2021-08-04 ENCOUNTER — HOSPITAL ENCOUNTER (EMERGENCY)
Facility: MEDICAL CENTER | Age: 31
End: 2021-08-04
Attending: EMERGENCY MEDICINE
Payer: MEDICAID

## 2021-08-04 VITALS
RESPIRATION RATE: 14 BRPM | WEIGHT: 154.98 LBS | DIASTOLIC BLOOD PRESSURE: 63 MMHG | TEMPERATURE: 99.6 F | HEART RATE: 100 BPM | OXYGEN SATURATION: 99 % | BODY MASS INDEX: 22.19 KG/M2 | SYSTOLIC BLOOD PRESSURE: 127 MMHG | HEIGHT: 70 IN

## 2021-08-04 DIAGNOSIS — F19.90 IVDU (INTRAVENOUS DRUG USER): ICD-10-CM

## 2021-08-04 DIAGNOSIS — F19.11 HISTORY OF SUBSTANCE ABUSE (HCC): ICD-10-CM

## 2021-08-04 DIAGNOSIS — L02.91 ABSCESS: ICD-10-CM

## 2021-08-04 LAB
BLOOD CULTURE HOLD CXBCH: NORMAL
GRAM STN SPEC: NORMAL
SIGNIFICANT IND 70042: NORMAL
SITE SITE: NORMAL
SOURCE SOURCE: NORMAL

## 2021-08-04 PROCEDURE — 99283 EMERGENCY DEPT VISIT LOW MDM: CPT

## 2021-08-04 PROCEDURE — 87070 CULTURE OTHR SPECIMN AEROBIC: CPT

## 2021-08-04 PROCEDURE — 303977 HCHG I & D

## 2021-08-04 PROCEDURE — A9270 NON-COVERED ITEM OR SERVICE: HCPCS | Performed by: EMERGENCY MEDICINE

## 2021-08-04 PROCEDURE — 87205 SMEAR GRAM STAIN: CPT

## 2021-08-04 PROCEDURE — 700102 HCHG RX REV CODE 250 W/ 637 OVERRIDE(OP): Performed by: EMERGENCY MEDICINE

## 2021-08-04 PROCEDURE — 700101 HCHG RX REV CODE 250: Performed by: EMERGENCY MEDICINE

## 2021-08-04 RX ORDER — AMOXICILLIN AND CLAVULANATE POTASSIUM 875; 125 MG/1; MG/1
1 TABLET, FILM COATED ORAL 2 TIMES DAILY
Qty: 20 TABLET | Refills: 0 | Status: SHIPPED | OUTPATIENT
Start: 2021-08-04 | End: 2021-08-14

## 2021-08-04 RX ORDER — SULFAMETHOXAZOLE AND TRIMETHOPRIM 800; 160 MG/1; MG/1
1 TABLET ORAL ONCE
Status: COMPLETED | OUTPATIENT
Start: 2021-08-04 | End: 2021-08-04

## 2021-08-04 RX ORDER — SULFAMETHOXAZOLE AND TRIMETHOPRIM 800; 160 MG/1; MG/1
1 TABLET ORAL 2 TIMES DAILY
Qty: 20 TABLET | Refills: 0 | Status: SHIPPED | OUTPATIENT
Start: 2021-08-04 | End: 2021-08-14

## 2021-08-04 RX ORDER — LIDOCAINE HYDROCHLORIDE AND EPINEPHRINE BITARTRATE 20; .01 MG/ML; MG/ML
10 INJECTION, SOLUTION SUBCUTANEOUS ONCE
Status: COMPLETED | OUTPATIENT
Start: 2021-08-04 | End: 2021-08-04

## 2021-08-04 RX ORDER — AMOXICILLIN AND CLAVULANATE POTASSIUM 875; 125 MG/1; MG/1
1 TABLET, FILM COATED ORAL ONCE
Status: COMPLETED | OUTPATIENT
Start: 2021-08-04 | End: 2021-08-04

## 2021-08-04 RX ADMIN — AMOXICILLIN AND CLAVULANATE POTASSIUM 1 TABLET: 875; 125 TABLET, FILM COATED ORAL at 08:23

## 2021-08-04 RX ADMIN — LIDOCAINE HYDROCHLORIDE AND EPINEPHRINE 10 ML: 20; 10 INJECTION, SOLUTION INFILTRATION; PERINEURAL at 08:15

## 2021-08-04 RX ADMIN — SULFAMETHOXAZOLE AND TRIMETHOPRIM 1 TABLET: 800; 160 TABLET ORAL at 08:23

## 2021-08-04 ASSESSMENT — FIBROSIS 4 INDEX: FIB4 SCORE: 0.89

## 2021-08-04 NOTE — ED PROVIDER NOTES
ED Provider Note    Scribed for Jose Armando Bettencourt M.D. by Sherly Bach. 8/4/2021  7:47 AM    Primary care provider: Pcp Pt States None  Means of arrival: Walk in  History obtained from: patient   History limited by: none    CHIEF COMPLAINT  Chief Complaint   Patient presents with    Arm Swelling     right shoulder abcess for a week, pain 6/10, redness noted        HPI  Luther Forrester is a 30 y.o. male who presents to the Emergency Department for evaluation of an abscess. Patient states he first noticed the abscess 5 days ago, but it has been worsening over the last two days. He has associated minimal pain, swelling, and redness. Denies allergies to medications. Patient has a history of IV drug use.     REVIEW OF SYSTEMS  Pertinent positives include abscess, pain, swelling, and redness.   Pertinent negatives include no fevers.    See HPI for further details.     PAST MEDICAL HISTORY   None noted    SURGICAL HISTORY   has a past surgical history that includes dental extraction(s); tonsillectomy; and irrigation & debridement ortho (Right, 9/12/2020).    SOCIAL HISTORY  Social History     Tobacco Use    Smoking status: Current Every Day Smoker     Packs/day: 0.50    Smokeless tobacco: Former User     Types: Chew     Quit date: 5/9/2018   Vaping Use    Vaping Use: Never used   Substance Use Topics    Alcohol use: No    Drug use: Not Currently     Types: IV, Methamphetamines, Intravenous     Comment: heroin/meth      Social History     Substance and Sexual Activity   Drug Use Not Currently    Types: IV, Methamphetamines, Intravenous    Comment: heroin/meth       FAMILY HISTORY  History reviewed. No pertinent family history.    CURRENT MEDICATIONS  Home Medications       Reviewed by Eddie Cox R.N. (Registered Nurse) on 08/04/21 at 0544  Med List Status: Not Addressed     Medication Last Dose Status   buprenorphine-naloxone (SUBOXONE) 8-2 mg SL Tab SL  Active   citalopram (CELEXA) 20 MG Tab  Active  "  ibuprofen (MOTRIN) 800 MG Tab  Active                    ALLERGIES  No Known Allergies    PHYSICAL EXAM  VITAL SIGNS: /72   Pulse 100   Temp 36.9 °C (98.5 °F) (Temporal)   Resp 14   Ht 1.778 m (5' 10\")   Wt 70.3 kg (154 lb 15.7 oz)   SpO2 100%   BMI 22.24 kg/m²     Nursing note and vitals reviewed.  Constitutional: Well-developed and well-nourished. No distress.   HENT: Head is normocephalic and atraumatic. Oropharynx is clear and moist without exudate or erythema.   Eyes: Pupils are equal, round, and reactive to light. Conjunctiva are normal.   Cardiovascular: Normal rate and regular rhythm. No murmur heard. Normal radial pulses.  Pulmonary/Chest: Breath sounds normal. No wheezes or rales.   Abdominal: Soft and non-tender. No distention    Musculoskeletal: Large abscess over right deltoid with central fluctuants. Extremities exhibit normal range of motion without edema or tenderness.   Neurological: Awake, alert and oriented to person, place, and time. No focal deficits noted.  Skin: Skin is warm and dry. No rash.   Psychiatric: Normal mood and affect. Appropriate for clinical situation.    DIAGNOSTIC STUDIES / PROCEDURES    Incision and Drainage Procedure Note    Indication: Abscess    Procedure: The patient was positioned appropriately and the skin over the incision site was prepped with betadine and draped in a sterile fashion. Local anesthesia was obtained by infiltration using 2% Lidocaine with epinephrine.  An incision was then made over the greatest area of fluctuance and approximately 5 cc of purulent material was expressed. Loculations were broken up using forceps and more of the material was able to be expressed. The drainage cavity was then packed with sterile gauze.    The patient tolerated the procedure well.    Complications: None    LABS  Labs Reviewed   CULTURE WOUND W/ GRAM STAIN   All labs reviewed by me.    COURSE & MEDICAL DECISION MAKING  Nursing notes, VS, PMSFHx reviewed in " chart.     7:47 AM - Patient seen and examined at bedside. I informed the patient we will need to drain the abscess and treat him with antibiotics for infection. Patient will be treated with Bactrim -160 mg and Augmentin 875-125 mg.     8:39 AM - Incision and dainage procedure preformed at this time (see procedure note above for details). Ordered wound culture. Discussed care of the wound and return precautions. Prescribed Bactrim and Augmentin. Patient will be discharged at this time. He verbalizes agreement with discharge and plan of care.    The patient will return for new or worsening symptoms and is stable at the time of discharge.    DISPOSITION:  Patient will be discharged home in stable condition.    FOLLOW UP:  [facility], Emergency Dept  [address] [id number] [phone]    If symptoms worsen      OUTPATIENT MEDICATIONS:  New Prescriptions    AMOXICILLIN-CLAVULANATE (AUGMENTIN) 875-125 MG TAB    Take 1 tablet by mouth 2 times a day for 10 days.    SULFAMETHOXAZOLE-TRIMETHOPRIM (BACTRIM DS) 800-160 MG TABLET    Take 1 tablet by mouth 2 times a day for 10 days.        FINAL IMPRESSION  1. Abscess    2. IVDU (intravenous drug user)          Sherly PIRES (Candy), am scribing for, and in the presence of, Jose Armando Bettencourt M.D..    Electronically signed by: Sherly Bach (Ashleyibmaria fernanda), 8/4/2021    Jose Armando PIRES M.D. personally performed the services described in this documentation, as scribed by Sherly Bach in my presence, and it is both accurate and complete. E    The note accurately reflects work and decisions made by me.  Jose Armando Bettencourt M.D.  8/4/2021  2:32 PM

## 2021-08-04 NOTE — ED NOTES
Discharge teaching and paperwork provided. All questions/concerns answered. VSS, assessment stable and PIV removed. Given information regarding prescriptions. Patient discharged to the care of self and ambulated out of the ED with steady gait.

## 2021-08-04 NOTE — ED TRIAGE NOTES
Chief Complaint   Patient presents with   • Arm Swelling     right shoulder abcess for a week, pain 6/10, redness noted      Pt ambulatory to triage for above complaint. Pt is hoping to have abscess drained today, pt states he has been having discomfort for a week.      Pt is alert/oriented and follows commands. Pt speaking in full sentences and responds appropriately to questions. No acute distress noted in triage and respirations are even and unlabored.     Pt placed in lobby and educated on triage process. Pt encouraged to alert staff for any changes in condition.

## 2021-08-06 LAB
BACTERIA WND AEROBE CULT: NORMAL
GRAM STN SPEC: NORMAL
SIGNIFICANT IND 70042: NORMAL
SITE SITE: NORMAL
SOURCE SOURCE: NORMAL

## 2022-09-24 ENCOUNTER — HOSPITAL ENCOUNTER (OUTPATIENT)
Dept: LAB | Facility: MEDICAL CENTER | Age: 32
End: 2022-09-24
Attending: NURSE PRACTITIONER
Payer: MEDICAID

## 2022-09-24 LAB
25(OH)D3 SERPL-MCNC: 51 NG/ML (ref 30–100)
ALBUMIN SERPL BCP-MCNC: 5.5 G/DL (ref 3.2–4.9)
ALBUMIN/GLOB SERPL: 1.7 G/DL
ALP SERPL-CCNC: 87 U/L (ref 30–99)
ALT SERPL-CCNC: 397 U/L (ref 2–50)
ANION GAP SERPL CALC-SCNC: 12 MMOL/L (ref 7–16)
AST SERPL-CCNC: 201 U/L (ref 12–45)
BASOPHILS # BLD AUTO: 0.6 % (ref 0–1.8)
BASOPHILS # BLD: 0.02 K/UL (ref 0–0.12)
BILIRUB SERPL-MCNC: 0.5 MG/DL (ref 0.1–1.5)
BUN SERPL-MCNC: 18 MG/DL (ref 8–22)
CALCIUM SERPL-MCNC: 9.8 MG/DL (ref 8.5–10.5)
CHLORIDE SERPL-SCNC: 98 MMOL/L (ref 96–112)
CHOLEST SERPL-MCNC: 112 MG/DL (ref 100–199)
CO2 SERPL-SCNC: 28 MMOL/L (ref 20–33)
CORTIS SERPL-MCNC: 12.5 UG/DL (ref 0–23)
CREAT SERPL-MCNC: 1.24 MG/DL (ref 0.5–1.4)
CRP SERPL HS-MCNC: 0.7 MG/L (ref 0–3)
EOSINOPHIL # BLD AUTO: 0.03 K/UL (ref 0–0.51)
EOSINOPHIL NFR BLD: 0.9 % (ref 0–6.9)
ERYTHROCYTE [DISTWIDTH] IN BLOOD BY AUTOMATED COUNT: 38.2 FL (ref 35.9–50)
EST. AVERAGE GLUCOSE BLD GHB EST-MCNC: 100 MG/DL
ESTRADIOL SERPL-MCNC: 15.5 PG/ML
FOLATE SERPL-MCNC: 26.4 NG/ML
FSH SERPL-ACNC: 3.6 MIU/ML (ref 1.5–12.4)
GFR SERPLBLD CREATININE-BSD FMLA CKD-EPI: 80 ML/MIN/1.73 M 2
GLOBULIN SER CALC-MCNC: 3.2 G/DL (ref 1.9–3.5)
GLUCOSE SERPL-MCNC: 106 MG/DL (ref 65–99)
HBA1C MFR BLD: 5.1 % (ref 4–5.6)
HBV CORE AB SERPL QL IA: NONREACTIVE
HBV SURFACE AB SERPL IA-ACNC: 46.7 MIU/ML (ref 0–10)
HBV SURFACE AG SER QL: NORMAL
HCT VFR BLD AUTO: 50.4 % (ref 42–52)
HCV AB SER QL: REACTIVE
HCYS SERPL-SCNC: 12.97 UMOL/L
HDLC SERPL-MCNC: 37 MG/DL
HGB BLD-MCNC: 17 G/DL (ref 14–18)
HIV 1+2 AB+HIV1 P24 AG SERPL QL IA: NORMAL
IMM GRANULOCYTES # BLD AUTO: 0.01 K/UL (ref 0–0.11)
IMM GRANULOCYTES NFR BLD AUTO: 0.3 % (ref 0–0.9)
IRON SATN MFR SERPL: 46 % (ref 15–55)
IRON SERPL-MCNC: 203 UG/DL (ref 50–180)
LDLC SERPL CALC-MCNC: 57 MG/DL
LH SERPL-ACNC: 2.9 IU/L (ref 1.7–8.6)
LYMPHOCYTES # BLD AUTO: 1.16 K/UL (ref 1–4.8)
LYMPHOCYTES NFR BLD: 35 % (ref 22–41)
MCH RBC QN AUTO: 28.9 PG (ref 27–33)
MCHC RBC AUTO-ENTMCNC: 33.7 G/DL (ref 33.7–35.3)
MCV RBC AUTO: 85.6 FL (ref 81.4–97.8)
MONOCYTES # BLD AUTO: 0.22 K/UL (ref 0–0.85)
MONOCYTES NFR BLD AUTO: 6.6 % (ref 0–13.4)
NEUTROPHILS # BLD AUTO: 1.87 K/UL (ref 1.82–7.42)
NEUTROPHILS NFR BLD: 56.6 % (ref 44–72)
NRBC # BLD AUTO: 0 K/UL
NRBC BLD-RTO: 0 /100 WBC
PLATELET # BLD AUTO: 168 K/UL (ref 164–446)
PMV BLD AUTO: 10.8 FL (ref 9–12.9)
POTASSIUM SERPL-SCNC: 4 MMOL/L (ref 3.6–5.5)
PROGEST SERPL-MCNC: 0.55 NG/ML
PROT SERPL-MCNC: 8.7 G/DL (ref 6–8.2)
RBC # BLD AUTO: 5.89 M/UL (ref 4.7–6.1)
SODIUM SERPL-SCNC: 138 MMOL/L (ref 135–145)
T PALLIDUM AB SER QL IA: NORMAL
T3FREE SERPL-MCNC: 3.42 PG/ML (ref 2–4.4)
TESTOST SERPL-MCNC: 143 NG/DL (ref 175–781)
TIBC SERPL-MCNC: 443 UG/DL (ref 250–450)
TRIGL SERPL-MCNC: 91 MG/DL (ref 0–149)
TSH SERPL DL<=0.005 MIU/L-ACNC: 1.27 UIU/ML (ref 0.38–5.33)
UIBC SERPL-MCNC: 240 UG/DL (ref 110–370)
VIT B12 SERPL-MCNC: 782 PG/ML (ref 211–911)
WBC # BLD AUTO: 3.3 K/UL (ref 4.8–10.8)

## 2022-09-24 PROCEDURE — 86708 HEPATITIS A ANTIBODY: CPT

## 2022-09-24 PROCEDURE — 84443 ASSAY THYROID STIM HORMONE: CPT

## 2022-09-24 PROCEDURE — 82746 ASSAY OF FOLIC ACID SERUM: CPT

## 2022-09-24 PROCEDURE — 84403 ASSAY OF TOTAL TESTOSTERONE: CPT

## 2022-09-24 PROCEDURE — 36415 COLL VENOUS BLD VENIPUNCTURE: CPT

## 2022-09-24 PROCEDURE — 86704 HEP B CORE ANTIBODY TOTAL: CPT

## 2022-09-24 PROCEDURE — 80053 COMPREHEN METABOLIC PANEL: CPT

## 2022-09-24 PROCEDURE — 87389 HIV-1 AG W/HIV-1&-2 AB AG IA: CPT

## 2022-09-24 PROCEDURE — 80061 LIPID PANEL: CPT

## 2022-09-24 PROCEDURE — 86780 TREPONEMA PALLIDUM: CPT

## 2022-09-24 PROCEDURE — 83001 ASSAY OF GONADOTROPIN (FSH): CPT

## 2022-09-24 PROCEDURE — 83540 ASSAY OF IRON: CPT

## 2022-09-24 PROCEDURE — 83036 HEMOGLOBIN GLYCOSYLATED A1C: CPT

## 2022-09-24 PROCEDURE — 85025 COMPLETE CBC W/AUTO DIFF WBC: CPT

## 2022-09-24 PROCEDURE — 87340 HEPATITIS B SURFACE AG IA: CPT

## 2022-09-24 PROCEDURE — 82670 ASSAY OF TOTAL ESTRADIOL: CPT

## 2022-09-24 PROCEDURE — 84481 FREE ASSAY (FT-3): CPT

## 2022-09-24 PROCEDURE — 83525 ASSAY OF INSULIN: CPT

## 2022-09-24 PROCEDURE — 87522 HEPATITIS C REVRS TRNSCRPJ: CPT

## 2022-09-24 PROCEDURE — 83090 ASSAY OF HOMOCYSTEINE: CPT

## 2022-09-24 PROCEDURE — 86706 HEP B SURFACE ANTIBODY: CPT

## 2022-09-24 PROCEDURE — 84439 ASSAY OF FREE THYROXINE: CPT

## 2022-09-24 PROCEDURE — 84270 ASSAY OF SEX HORMONE GLOBUL: CPT

## 2022-09-24 PROCEDURE — 82306 VITAMIN D 25 HYDROXY: CPT

## 2022-09-24 PROCEDURE — 82607 VITAMIN B-12: CPT

## 2022-09-24 PROCEDURE — 83002 ASSAY OF GONADOTROPIN (LH): CPT

## 2022-09-24 PROCEDURE — 82533 TOTAL CORTISOL: CPT

## 2022-09-24 PROCEDURE — 83550 IRON BINDING TEST: CPT

## 2022-09-24 PROCEDURE — 84144 ASSAY OF PROGESTERONE: CPT

## 2022-09-24 PROCEDURE — 86141 C-REACTIVE PROTEIN HS: CPT

## 2022-09-24 PROCEDURE — 86803 HEPATITIS C AB TEST: CPT

## 2022-09-26 LAB — HAV AB SER QL IA: POSITIVE

## 2022-09-29 LAB
HCV RNA SERPL NAA+PROBE-ACNC: ABNORMAL IU/ML
HCV RNA SERPL NAA+PROBE-LOG IU: 6.84 LOG IU/ML
HCV RNA SERPL QL NAA+PROBE: DETECTED
INSULIN P FAST SERPL-ACNC: 11 UIU/ML (ref 3–25)
SHBG SERPL-SCNC: 22 NMOL/L (ref 17–56)

## 2022-09-30 LAB — T4 FREE SERPL DIALY-MCNC: 1.3 NG/DL (ref 1.1–2.4)

## 2022-12-23 ENCOUNTER — HOSPITAL ENCOUNTER (EMERGENCY)
Facility: MEDICAL CENTER | Age: 32
End: 2022-12-23
Attending: EMERGENCY MEDICINE
Payer: MEDICAID

## 2022-12-23 VITALS
DIASTOLIC BLOOD PRESSURE: 65 MMHG | WEIGHT: 160.94 LBS | OXYGEN SATURATION: 99 % | RESPIRATION RATE: 16 BRPM | HEART RATE: 68 BPM | SYSTOLIC BLOOD PRESSURE: 124 MMHG | HEIGHT: 70 IN | BODY MASS INDEX: 23.04 KG/M2 | TEMPERATURE: 97.3 F

## 2022-12-23 DIAGNOSIS — B18.2 CHRONIC HEPATITIS C WITHOUT HEPATIC COMA (HCC): ICD-10-CM

## 2022-12-23 DIAGNOSIS — K92.1 HEMATOCHEZIA: ICD-10-CM

## 2022-12-23 LAB
ALBUMIN SERPL BCP-MCNC: 4.8 G/DL (ref 3.2–4.9)
ALBUMIN/GLOB SERPL: 1.4 G/DL
ALP SERPL-CCNC: 91 U/L (ref 30–99)
ALT SERPL-CCNC: 427 U/L (ref 2–50)
ANION GAP SERPL CALC-SCNC: 12 MMOL/L (ref 7–16)
APTT PPP: 32.6 SEC (ref 24.7–36)
AST SERPL-CCNC: 208 U/L (ref 12–45)
BASOPHILS # BLD AUTO: 0.4 % (ref 0–1.8)
BASOPHILS # BLD: 0.02 K/UL (ref 0–0.12)
BILIRUB SERPL-MCNC: 0.4 MG/DL (ref 0.1–1.5)
BUN SERPL-MCNC: 15 MG/DL (ref 8–22)
CALCIUM ALBUM COR SERPL-MCNC: 9 MG/DL (ref 8.5–10.5)
CALCIUM SERPL-MCNC: 9.6 MG/DL (ref 8.4–10.2)
CHLORIDE SERPL-SCNC: 101 MMOL/L (ref 96–112)
CO2 SERPL-SCNC: 24 MMOL/L (ref 20–33)
CREAT SERPL-MCNC: 0.96 MG/DL (ref 0.5–1.4)
EOSINOPHIL # BLD AUTO: 0.05 K/UL (ref 0–0.51)
EOSINOPHIL NFR BLD: 1.1 % (ref 0–6.9)
ERYTHROCYTE [DISTWIDTH] IN BLOOD BY AUTOMATED COUNT: 35.7 FL (ref 35.9–50)
GFR SERPLBLD CREATININE-BSD FMLA CKD-EPI: 108 ML/MIN/1.73 M 2
GLOBULIN SER CALC-MCNC: 3.4 G/DL (ref 1.9–3.5)
GLUCOSE SERPL-MCNC: 86 MG/DL (ref 65–99)
HCT VFR BLD AUTO: 43.7 % (ref 42–52)
HGB BLD-MCNC: 15.1 G/DL (ref 14–18)
IMM GRANULOCYTES # BLD AUTO: 0.01 K/UL (ref 0–0.11)
IMM GRANULOCYTES NFR BLD AUTO: 0.2 % (ref 0–0.9)
INR PPP: 0.95 (ref 0.87–1.13)
LYMPHOCYTES # BLD AUTO: 2.09 K/UL (ref 1–4.8)
LYMPHOCYTES NFR BLD: 46 % (ref 22–41)
MCH RBC QN AUTO: 28.8 PG (ref 27–33)
MCHC RBC AUTO-ENTMCNC: 34.6 G/DL (ref 33.7–35.3)
MCV RBC AUTO: 83.2 FL (ref 81.4–97.8)
MONOCYTES # BLD AUTO: 0.35 K/UL (ref 0–0.85)
MONOCYTES NFR BLD AUTO: 7.7 % (ref 0–13.4)
NEUTROPHILS # BLD AUTO: 2.02 K/UL (ref 1.82–7.42)
NEUTROPHILS NFR BLD: 44.6 % (ref 44–72)
NRBC # BLD AUTO: 0 K/UL
NRBC BLD-RTO: 0 /100 WBC
PLATELET # BLD AUTO: 150 K/UL (ref 164–446)
PMV BLD AUTO: 10.1 FL (ref 9–12.9)
POTASSIUM SERPL-SCNC: 4.2 MMOL/L (ref 3.6–5.5)
PROT SERPL-MCNC: 8.2 G/DL (ref 6–8.2)
PROTHROMBIN TIME: 12.6 SEC (ref 12–14.6)
RBC # BLD AUTO: 5.25 M/UL (ref 4.7–6.1)
SODIUM SERPL-SCNC: 137 MMOL/L (ref 135–145)
WBC # BLD AUTO: 4.5 K/UL (ref 4.8–10.8)

## 2022-12-23 PROCEDURE — 80053 COMPREHEN METABOLIC PANEL: CPT

## 2022-12-23 PROCEDURE — 85730 THROMBOPLASTIN TIME PARTIAL: CPT

## 2022-12-23 PROCEDURE — 99283 EMERGENCY DEPT VISIT LOW MDM: CPT

## 2022-12-23 PROCEDURE — 36415 COLL VENOUS BLD VENIPUNCTURE: CPT

## 2022-12-23 PROCEDURE — 85025 COMPLETE CBC W/AUTO DIFF WBC: CPT

## 2022-12-23 PROCEDURE — 85610 PROTHROMBIN TIME: CPT

## 2022-12-23 RX ORDER — IBUPROFEN 200 MG
600 TABLET ORAL 2 TIMES DAILY PRN
COMMUNITY

## 2022-12-23 RX ORDER — BUPRENORPHINE AND NALOXONE 4; 1 MG/1; MG/1
1 FILM, SOLUBLE BUCCAL; SUBLINGUAL DAILY
COMMUNITY

## 2022-12-23 ASSESSMENT — FIBROSIS 4 INDEX: FIB4 SCORE: 1.86

## 2022-12-23 NOTE — ED PROVIDER NOTES
ED Provider Note    CHIEF COMPLAINT  Rectal bleeding    LIMITATION TO HISTORY   Select: : None    HPI  Luther Forrester is a 32 y.o. male who presents for evaluation of blood in his stool for the past week or so.  He notes that the blood is present at the end of his bowel movement.  Really not having much in the way of abdominal pain, minimal lower abdominal pain, no back pain.  He has no pain when he wipes.  No lightheadedness chest pain or shortness of breath.  His past medical history is significant for otitis C and he reports having abnormal liver function test.  He has been referred to GI but goes through [facility] and there has been an issue with his referral    EXTERNAL RECORDS REVIEWED  Select: Inpatient Notes discharge summary from September 2020, admitted twice with leg infections    REVIEW OF SYSTEMS  See HPI for further details. All other systems are negative.     PAST MEDICAL HISTORY       SURGICAL HISTORY   has a past surgical history that includes dental extraction(s); tonsillectomy; and irrigation & debridement ortho (Right, 9/12/2020).    FAMILY HISTORY  No family history on file.    SOCIAL HISTORY  Social History     Tobacco Use    Smoking status: Every Day     Packs/day: 0.50     Types: Cigarettes    Smokeless tobacco: Former     Types: Chew     Quit date: 5/9/2018   Vaping Use    Vaping Use: Never used   Substance and Sexual Activity    Alcohol use: No    Drug use: Not Currently     Types: IV, Methamphetamines, Intravenous     Comment: heroin/meth    Sexual activity: Not on file       CURRENT MEDICATIONS  Home Medications       Reviewed by Lakeshia Pimentel (Pharmacy Tech) on 12/23/22 at 1309  Med List Status: Complete     Medication Last Dose Status   buprenorphine-naloxone (SUBOXONE) 4-1 mg FILM SL 12/23/2022 Active   ibuprofen (MOTRIN) 200 MG Tab ABOUT 1 WEEK AGO Active   Pseudoephedrine-APAP-DM (DAYQUIL PO) ABOUT 1 WEEK AGO Active                    ALLERGIES  No Known  "Allergies    PHYSICAL EXAM  VITAL SIGNS: /71   Pulse 72   Temp 36 °C (96.8 °F) (Temporal)   Resp 17   Ht 1.778 m (5' 10\")   Wt 73 kg (160 lb 15 oz)   SpO2 99%   BMI 23.09 kg/m²    Constitutional: Alert in no apparent distress.  HENT: No signs of trauma.   Eyes: Conjunctiva normal, Non-icteric.   Chest: Normal nonlabored respirations  Skin: Warm, Dry, No erythema, No rash.   Extremities: No edema.   Rectal: Normal external inspection.  No hemorrhoids.  No tenderness with digital insertion, some brown stool with no blood  Musculoskeletal: Good range of motion in all major joints.  Neurologic: Alert, No focal deficits noted. Gait is normal.  Psychiatric: Affect normal, Judgment normal.     DIAGNOSTIC STUDIES / PROCEDURES    LABS  Results for orders placed or performed during the hospital encounter of 12/23/22   CBC WITH DIFFERENTIAL   Result Value Ref Range    WBC 4.5 (L) 4.8 - 10.8 K/uL    RBC 5.25 4.70 - 6.10 M/uL    Hemoglobin 15.1 14.0 - 18.0 g/dL    Hematocrit 43.7 42.0 - 52.0 %    MCV 83.2 81.4 - 97.8 fL    MCH 28.8 27.0 - 33.0 pg    MCHC 34.6 33.7 - 35.3 g/dL    RDW 35.7 (L) 35.9 - 50.0 fL    Platelet Count 150 (L) 164 - 446 K/uL    MPV 10.1 9.0 - 12.9 fL    Neutrophils-Polys 44.60 44.00 - 72.00 %    Lymphocytes 46.00 (H) 22.00 - 41.00 %    Monocytes 7.70 0.00 - 13.40 %    Eosinophils 1.10 0.00 - 6.90 %    Basophils 0.40 0.00 - 1.80 %    Immature Granulocytes 0.20 0.00 - 0.90 %    Nucleated RBC 0.00 /100 WBC    Neutrophils (Absolute) 2.02 1.82 - 7.42 K/uL    Lymphs (Absolute) 2.09 1.00 - 4.80 K/uL    Monos (Absolute) 0.35 0.00 - 0.85 K/uL    Eos (Absolute) 0.05 0.00 - 0.51 K/uL    Baso (Absolute) 0.02 0.00 - 0.12 K/uL    Immature Granulocytes (abs) 0.01 0.00 - 0.11 K/uL    NRBC (Absolute) 0.00 K/uL   COMP METABOLIC PANEL   Result Value Ref Range    Sodium 137 135 - 145 mmol/L    Potassium 4.2 3.6 - 5.5 mmol/L    Chloride 101 96 - 112 mmol/L    Co2 24 20 - 33 mmol/L    Anion Gap 12.0 7.0 - 16.0    " Glucose 86 65 - 99 mg/dL    Bun 15 8 - 22 mg/dL    Creatinine 0.96 0.50 - 1.40 mg/dL    Calcium 9.6 8.4 - 10.2 mg/dL    AST(SGOT) 208 (H) 12 - 45 U/L    ALT(SGPT) 427 (H) 2 - 50 U/L    Alkaline Phosphatase 91 30 - 99 U/L    Total Bilirubin 0.4 0.1 - 1.5 mg/dL    Albumin 4.8 3.2 - 4.9 g/dL    Total Protein 8.2 6.0 - 8.2 g/dL    Globulin 3.4 1.9 - 3.5 g/dL    A-G Ratio 1.4 g/dL   PROTHROMBIN TIME (INR)   Result Value Ref Range    PT 12.6 12.0 - 14.6 sec    INR 0.95 0.87 - 1.13   APTT   Result Value Ref Range    APTT 32.6 24.7 - 36.0 sec   CORRECTED CALCIUM   Result Value Ref Range    Correct Calcium 9.0 8.5 - 10.5 mg/dL   ESTIMATED GFR   Result Value Ref Range    GFR (CKD-EPI) 108 >60 mL/min/1.73 m 2        COURSE & MEDICAL DECISION MAKING  Pertinent Labs & Imaging studies reviewed. (See chart for details)    Differential Diagnosis Considered  Blood loss anemia, coagulopathy from his liver dysfunction    Escalation of care considered, and ultimately not performed: acute inpatient care management, however at this time, the patient is most appropriate for outpatient management.    Barriers to care at this time, including but not limited to: Select: Patient has Medicaid, has been struggling to have a successful referral to GI through his medical clinic .     In addition to the chief complaint, the following problems were addressed: His need for referral to GI for his hepatitis     This is a 32-year-old male with hepatitis C presenting with rectal bleeding, normal rectal exam, no significant abdominal pain, normal vital signs, blood work is obtained given his history of hepatitis which reveals no significant coagulopathy nor anemia.  I have referred him to GI.  He is discharged home in stable condition    FINAL IMPRESSION  1. Hematochezia    2. Chronic hepatitis C without hepatic coma (HCC)           Electronically signed by: Johan Cedeno M.D., 12/23/2022 1:35 PM

## 2022-12-23 NOTE — ED NOTES
D/c pt home, no rx given . Pt aware of f/u instructions , aware to return for any changes or concerns. No further questions upon d/c home from ed

## 2022-12-23 NOTE — ED NOTES
Pt reports new hep C diagnosis. Also had the flu last week - was taking ibuprofen for this. Denies exceeded recommended daily dose.

## 2022-12-23 NOTE — ED TRIAGE NOTES
Pt amb to triage c/o blood in stool x1 wk. Pt reports bright red blood noted wafer bm while wiping. Pt denies abd pain

## 2023-04-22 ENCOUNTER — OFFICE VISIT (OUTPATIENT)
Dept: URGENT CARE | Facility: CLINIC | Age: 33
End: 2023-04-22
Payer: MEDICAID

## 2023-04-22 VITALS
DIASTOLIC BLOOD PRESSURE: 70 MMHG | SYSTOLIC BLOOD PRESSURE: 118 MMHG | OXYGEN SATURATION: 100 % | HEART RATE: 124 BPM | HEIGHT: 70 IN | TEMPERATURE: 97.6 F | BODY MASS INDEX: 20.04 KG/M2 | RESPIRATION RATE: 18 BRPM | WEIGHT: 140 LBS

## 2023-04-22 DIAGNOSIS — F19.90 IVDU (INTRAVENOUS DRUG USER): ICD-10-CM

## 2023-04-22 DIAGNOSIS — L03.115 CELLULITIS OF RIGHT LOWER EXTREMITY: ICD-10-CM

## 2023-04-22 PROBLEM — F11.10 OPIOID ABUSE (HCC): Status: ACTIVE | Noted: 2021-03-26

## 2023-04-22 PROBLEM — F11.20 OPIATE DEPENDENCE, CONTINUOUS (HCC): Status: ACTIVE | Noted: 2021-03-27

## 2023-04-22 PROCEDURE — 99213 OFFICE O/P EST LOW 20 MIN: CPT

## 2023-04-22 RX ORDER — DOXYCYCLINE HYCLATE 100 MG
100 TABLET ORAL 2 TIMES DAILY
Qty: 10 TABLET | Refills: 0 | Status: SHIPPED | OUTPATIENT
Start: 2023-04-22 | End: 2023-04-27

## 2023-04-22 RX ORDER — CITALOPRAM 20 MG/1
20 TABLET ORAL DAILY
COMMUNITY

## 2023-04-22 ASSESSMENT — ENCOUNTER SYMPTOMS
LEG SWELLING: 1
FEVER: 0
CHILLS: 0
VOMITING: 0

## 2023-04-22 ASSESSMENT — FIBROSIS 4 INDEX: FIB4 SCORE: 2.08

## 2023-04-22 NOTE — PROGRESS NOTES
Subjective     Luther Forrester is a 32 y.o. male who presents with Follow-Up (Right leg follow-up)            Leg Swelling  This is a new problem. The current episode started 1 to 4 weeks ago. The problem has been gradually improving. Pertinent negatives include no chills, fever or vomiting. Nothing aggravates the symptoms. He has tried NSAIDs (elevation) for the symptoms. The treatment provided mild relief.     Patient with long history of IV drug abuse.  He has been treated multiple times for abscesses on the upper extremity and cellulitis on the lower extremities due to these.  She reports that 2 weeks ago, he had cellulitis on the right leg.  He went to [facility] emergency room and was diagnosed with cellulitis and prescribed clindamycin.  He reports noncompliance with antibiotic, states that he was taking this once daily and sometimes not taking it at all.  2 weeks ago, he reports injecting fentanyl, heroin, and meth into his arms and neck.  He states that he is currently detoxing and last used drugs 5 days ago.  He is on Suboxone for detox.  He reports cellulitis got better but still have some swelling and redness on his right lower extremity    Patient's current problem list, medications, and past medical/surgical history were reviewed in Epic.    PMH:  has no past medical history of Asthma or Diabetes (HCC).  MEDS:   Current Outpatient Medications:     citalopram (CELEXA) 20 MG Tab, Take 20 mg by mouth every day., Disp: , Rfl:     buprenorphine-naloxone (SUBOXONE) 4-1 mg FILM SL, Place 1 Film under the tongue every day., Disp: , Rfl:     ibuprofen (MOTRIN) 200 MG Tab, Take 600 mg by mouth 2 times a day as needed. Indications: Pain, Disp: , Rfl:     Pseudoephedrine-APAP-DM (DAYQUIL PO), Take 2 Tablets by mouth 1 time a day as needed (Cold symptoms)., Disp: , Rfl:   ALLERGIES: No Known Allergies  SURGHX:   Past Surgical History:   Procedure Laterality Date    IRRIGATION & DEBRIDEMENT ORTHO Right  "9/12/2020    Procedure: IRRIGATION AND DEBRIDEMENT, WOUND;  Surgeon: Brian Andrea M.D.;  Location: SURGERY [facility];  Service: Orthopedics    DENTAL EXTRACTION(S)      wisdom teeth    TONSILLECTOMY       SOCHX:  reports that he has quit smoking. His smoking use included cigarettes. He smoked an average of .5 packs per day. He quit smokeless tobacco use about 4 years ago.  His smokeless tobacco use included chew. He reports that he does not currently use drugs after having used the following drugs: IV, Methamphetamines, and Intravenous. He reports that he does not drink alcohol.  FH: Reviewed with patient, not pertinent to this visit. '    Review of Systems   Constitutional:  Negative for chills and fever.   Gastrointestinal:  Negative for vomiting.            Objective     /70   Pulse (!) 124   Temp 36.4 °C (97.6 °F) (Temporal)   Resp 18   Ht 1.778 m (5' 10\")   Wt 63.5 kg (140 lb)   SpO2 100%   BMI 20.09 kg/m²      Physical Exam  Constitutional:       Appearance: Normal appearance.   HENT:      Head: Normocephalic.      Nose: Nose normal.   Eyes:      Extraocular Movements: Extraocular movements intact.   Cardiovascular:      Rate and Rhythm: Normal rate and regular rhythm.      Pulses: Normal pulses.      Heart sounds: Normal heart sounds.   Pulmonary:      Effort: Pulmonary effort is normal.      Breath sounds: Normal breath sounds.   Musculoskeletal:         General: Normal range of motion.      Cervical back: Normal range of motion.      Right lower leg: Swelling present. No tenderness.   Skin:     General: Skin is warm.   Neurological:      General: No focal deficit present.      Mental Status: He is alert.   Psychiatric:         Mood and Affect: Mood normal.         Behavior: Behavior normal.         Assessment & Plan        1. Cellulitis of right lower extremity  doxycycline (VIBRAMYCIN) 100 MG Tab      2. IVDU (intravenous drug user)             Patient's presentation is consistent with " cellulitis of the right lower extremity.  He is prescribed doxycycline twice daily for 5 days.  Recommended elevation, ice pack/cold compress, NSAIDs for relief of swelling.  Patient counseled against IV drug use.  He was given strict ER precautions.  Discussed treatment plan with patient, he is agreeable and verbalized understanding.  Educated patient on signs and symptoms watch out for, when to return to the clinic or go to the ER.    Electronically Signed by SEEMA Macias              none

## 2023-04-22 NOTE — LETTER
"[facility] [place] [id number]     April 22, 2023    Patient: Luther Forrester   YOB: 1990   Date of Visit: 4/22/2023       To Whom It May Concern:    Luther Forrester was seen and treated in our department on 4/22/2023.     Sincerely,     Karen Mirza \"Alondra\" SEEMA Hernandez                 "

## 2023-08-21 ENCOUNTER — NON-PROVIDER VISIT (OUTPATIENT)
Dept: OCCUPATIONAL MEDICINE | Facility: CLINIC | Age: 33
End: 2023-08-21

## 2023-08-21 DIAGNOSIS — Z02.1 PRE-EMPLOYMENT HEALTH SCREENING EXAMINATION: ICD-10-CM

## 2023-08-21 DIAGNOSIS — Z02.1 PRE-EMPLOYMENT DRUG SCREENING: ICD-10-CM

## 2023-08-21 LAB
AMP AMPHETAMINE: NORMAL
COC COCAINE: NORMAL
INT CON NEG: NORMAL
INT CON POS: NORMAL
MET METHAMPHETAMINES: NORMAL
OPI OPIATES: NORMAL
PCP PHENCYCLIDINE: NORMAL
POC DRUG COMMENT 753798-OCCUPATIONAL HEALTH: NEGATIVE
THC: NORMAL

## 2023-08-21 PROCEDURE — 80305 DRUG TEST PRSMV DIR OPT OBS: CPT | Performed by: PREVENTIVE MEDICINE

## 2024-04-01 NOTE — ED NOTES
Discharge instructions given. All questions answered. Prescriptions given x1. Pt to follow-up with PCP. Pt verbalized understanding. All belongings with pt. Pt self ambulatory to lobby. Pt educated not to drive while on narcotics.      Condition:: FBSE Please Describe Your Condition:: Pt here for evaluation of new/changing moles or lesions.

## 2024-06-10 ENCOUNTER — OFFICE VISIT (OUTPATIENT)
Dept: URGENT CARE | Facility: CLINIC | Age: 34
End: 2024-06-10
Payer: COMMERCIAL

## 2024-06-10 VITALS
HEART RATE: 70 BPM | DIASTOLIC BLOOD PRESSURE: 62 MMHG | SYSTOLIC BLOOD PRESSURE: 112 MMHG | HEIGHT: 70 IN | BODY MASS INDEX: 25.62 KG/M2 | OXYGEN SATURATION: 97 % | TEMPERATURE: 98.2 F | RESPIRATION RATE: 13 BRPM | WEIGHT: 179 LBS

## 2024-06-10 DIAGNOSIS — J34.0 CELLULITIS OF NASAL TIP: ICD-10-CM

## 2024-06-10 PROCEDURE — 3078F DIAST BP <80 MM HG: CPT | Performed by: PHYSICIAN ASSISTANT

## 2024-06-10 PROCEDURE — 99214 OFFICE O/P EST MOD 30 MIN: CPT | Performed by: PHYSICIAN ASSISTANT

## 2024-06-10 PROCEDURE — 3074F SYST BP LT 130 MM HG: CPT | Performed by: PHYSICIAN ASSISTANT

## 2024-06-10 RX ORDER — METHADONE HYDROCHLORIDE 10 MG/1
10 TABLET ORAL
COMMUNITY

## 2024-06-10 RX ORDER — CEPHALEXIN 500 MG/1
500 CAPSULE ORAL 4 TIMES DAILY
Qty: 28 CAPSULE | Refills: 0 | Status: SHIPPED | OUTPATIENT
Start: 2024-06-10

## 2024-06-10 ASSESSMENT — ENCOUNTER SYMPTOMS
CONSTITUTIONAL NEGATIVE: 1
RESPIRATORY NEGATIVE: 1
CARDIOVASCULAR NEGATIVE: 1
NEUROLOGICAL NEGATIVE: 1

## 2024-06-10 ASSESSMENT — FIBROSIS 4 INDEX: FIB4 SCORE: 2.15

## 2024-06-11 NOTE — PROGRESS NOTES
Subjective     Luther MESSINA is a very pleasant 33 y.o. male who presents with Abscess (On the tip of the nose x 6 months)            HPI  Patient with pain and redness inside his right nostril for the past several months.  He has been using antibiotic ointment which has helped.  However over the last few days the redness has erupted on the tip of his nose with tenderness and swelling.  There is no open lesions or discharge.  No fever chills or bodyaches.  Of note patient has a history of recurrent cellulitis but no history of MRSA.  Previous cultures have showed strep viridans.      PMH:  has no past medical history of Asthma or Diabetes ([facility]).  MEDS:   Current Outpatient Medications:     methadone (DOLOPHINE) 10 MG Tab, Take 10 mg by mouth., Disp: , Rfl:     cephALEXin (KEFLEX) 500 MG Cap, Take 1 Capsule by mouth 4 times a day., Disp: 28 Capsule, Rfl: 0    citalopram (CELEXA) 20 MG Tab, Take 20 mg by mouth every day., Disp: , Rfl:   ALLERGIES: No Known Allergies  SURGHX:   Past Surgical History:   Procedure Laterality Date    IRRIGATION & DEBRIDEMENT ORTHO Right 9/12/2020    Procedure: IRRIGATION AND DEBRIDEMENT, WOUND;  Surgeon: Brian Andrea M.D.;  Location: SURGERY [facility];  Service: Orthopedics    DENTAL EXTRACTION(S)      wisdom teeth    TONSILLECTOMY       SOCHX:  reports that he has quit smoking. His smoking use included cigarettes. He quit smokeless tobacco use about 6 years ago.  His smokeless tobacco use included chew. He reports that he does not currently use drugs after having used the following drugs: IV, Methamphetamines, and Intravenous. He reports that he does not drink alcohol.  FH: family history is not on file.        Review of Systems   Constitutional: Negative.    Respiratory: Negative.     Cardiovascular: Negative.    Skin:         Nasal tip infection   Neurological: Negative.        Medications, Allergies, and current problem list reviewed today in Epic           Objective  "    /62   Pulse 70   Temp 36.8 °C (98.2 °F) (Temporal)   Resp 13   Ht 1.778 m (5' 10\")   Wt 81.2 kg (179 lb)   SpO2 97%   BMI 25.68 kg/m²      Physical Exam  Vitals and nursing note reviewed.   Constitutional:       General: He is not in acute distress.     Appearance: Normal appearance. He is well-developed. He is not diaphoretic.   HENT:      Head: Normocephalic and atraumatic.      Right Ear: Hearing and external ear normal.      Left Ear: Hearing and external ear normal.      Nose: Nose normal.      Mouth/Throat:      Mouth: Mucous membranes are moist.   Eyes:      General:         Right eye: No discharge.         Left eye: No discharge.      Conjunctiva/sclera: Conjunctivae normal.   Cardiovascular:      Rate and Rhythm: Normal rate and regular rhythm.   Pulmonary:      Effort: Pulmonary effort is normal. No respiratory distress.      Breath sounds: Normal breath sounds. No stridor. No wheezing, rhonchi or rales.   Musculoskeletal:      Cervical back: Normal range of motion and neck supple. No tenderness.      Right lower leg: No edema.      Left lower leg: No edema.   Lymphadenopathy:      Cervical: No cervical adenopathy.   Skin:     General: Skin is warm and dry.      Findings: Erythema present.      Comments: Swelling, redness and tenderness of the tip of the nose.  Slight fluctuance.  No open lesions or discharge.  No internal nasal involvement.   Neurological:      General: No focal deficit present.      Mental Status: He is alert and oriented to person, place, and time. Mental status is at baseline.   Psychiatric:         Mood and Affect: Mood normal.         Behavior: Behavior normal.         Thought Content: Thought content normal.         Judgment: Judgment normal.                             Assessment & Plan        1. Cellulitis of nasal tip  cephALEXin (KEFLEX) 500 MG Cap        This is a very pleasant 33-year-old male presenting with a infection of his nasal tip for the past few days. "  He was having some internal nasal symptoms for the past several months and started using OTC ointment which did help but moved the infection to his outer nose.  He denies any fever chills or bodyaches.  Of note patient has a history of recurrent cellulitis due to previous IV drug use.  He has never had MRSA but multiple cultures have shown strep viridans.  Vitals are normal.  Exam shows swelling tenderness erythema and fluctuance to the tip of the nose.  No obvious abscess.  No internal nasal involvement.  No regional adenopathy.  Patient will be treated with Keflex to cover for strep.  Did advise patient that if his symptoms do not improve we may need to add an antibiotic to cover for staph/MRSA. OTC meds and conservative measures as discussed      I personally reviewed prior external notes and test results pertinent to today's visit. Return to clinic or go to ED if symptoms worsen or persist. Red flag symptoms and indications for ED discussed at length. Patient/Parent/Guardian voices understanding.  AVS with post-visit instructions printed and provided or given verbally.  Follow-up with your primary care provider in 3-5 days. All side effects and potential interactions of prescribed medication discussed including allergic response, GI upset, tendon injury, rash, sedation, OCP effectiveness, etc.    Please note that this dictation was created using voice recognition software. I have made every reasonable attempt to correct obvious errors, but I expect that there are errors of grammar and possibly content that I did not discover before finalizing the note.

## 2024-08-05 ENCOUNTER — APPOINTMENT (RX ONLY)
Dept: URBAN - METROPOLITAN AREA CLINIC 4 | Facility: CLINIC | Age: 34
Setting detail: DERMATOLOGY
End: 2024-08-05

## 2024-08-05 DIAGNOSIS — Z71.89 OTHER SPECIFIED COUNSELING: ICD-10-CM

## 2024-08-05 DIAGNOSIS — L71.8 OTHER ROSACEA: ICD-10-CM

## 2024-08-05 PROCEDURE — 99203 OFFICE O/P NEW LOW 30 MIN: CPT

## 2024-08-05 PROCEDURE — ? SUNSCREEN RECOMMENDATIONS

## 2024-08-05 PROCEDURE — ? PRESCRIPTION

## 2024-08-05 PROCEDURE — ? MEDICATION COUNSELING

## 2024-08-05 PROCEDURE — ? COUNSELING

## 2024-08-05 RX ORDER — METRONIDAZOLE 10 MG/G
GEL TOPICAL QHS
Qty: 60 | Refills: 2 | COMMUNITY
Start: 2024-08-05

## 2024-08-05 RX ORDER — METRONIDAZOLE 10 MG/G
GEL TOPICAL QHS
Qty: 60 | Refills: 2 | Status: ERX

## 2024-08-05 RX ORDER — OXYMETAZOLINE HYDROCHLORIDE 1 G/100G
1 CREAM TOPICAL QAM
Qty: 30 | Refills: 3 | Status: ERX

## 2024-08-05 RX ORDER — OXYMETAZOLINE HYDROCHLORIDE 1 G/100G
1 CREAM TOPICAL QAM
Qty: 30 | Refills: 3 | COMMUNITY
Start: 2024-08-05

## 2024-08-05 RX ADMIN — OXYMETAZOLINE HYDROCHLORIDE 1: 1 CREAM TOPICAL at 00:00

## 2024-08-05 RX ADMIN — METRONIDAZOLE: 10 GEL TOPICAL at 00:00

## 2024-08-05 ASSESSMENT — LOCATION DETAILED DESCRIPTION DERM
LOCATION DETAILED: RIGHT CENTRAL MALAR CHEEK
LOCATION DETAILED: LEFT RADIAL DORSAL HAND
LOCATION DETAILED: LEFT MEDIAL MALAR CHEEK
LOCATION DETAILED: RIGHT RADIAL DORSAL HAND

## 2024-08-05 ASSESSMENT — LOCATION SIMPLE DESCRIPTION DERM
LOCATION SIMPLE: LEFT CHEEK
LOCATION SIMPLE: RIGHT HAND
LOCATION SIMPLE: LEFT HAND
LOCATION SIMPLE: RIGHT CHEEK

## 2024-08-05 ASSESSMENT — LOCATION ZONE DERM
LOCATION ZONE: HAND
LOCATION ZONE: FACE

## 2024-08-05 NOTE — PROCEDURE: MEDICATION COUNSELING
Cyclosporine Pregnancy And Lactation Text: This medication is Pregnancy Category C and it isn't know if it is safe during pregnancy. This medication is excreted in breast milk.
Tazorac Counseling:  Patient advised that medication is irritating and drying.  Patient may need to apply sparingly and wash off after an hour before eventually leaving it on overnight.  The patient verbalized understanding of the proper use and possible adverse effects of tazorac.  All of the patient's questions and concerns were addressed.
Otezla Counseling: The side effects of Otezla were discussed with the patient, including but not limited to worsening or new depression, weight loss, diarrhea, nausea, upper respiratory tract infection, and headache. Patient instructed to call the office should any adverse effect occur.  The patient verbalized understanding of the proper use and possible adverse effects of Otezla.  All the patient's questions and concerns were addressed.
Tetracycline Counseling: Patient counseled regarding possible photosensitivity and increased risk for sunburn.  Patient instructed to avoid sunlight, if possible.  When exposed to sunlight, patients should wear protective clothing, sunglasses, and sunscreen.  The patient was instructed to call the office immediately if the following severe adverse effects occur:  hearing changes, easy bruising/bleeding, severe headache, or vision changes.  The patient verbalized understanding of the proper use and possible adverse effects of tetracycline.  All of the patient's questions and concerns were addressed. Patient understands to avoid pregnancy while on therapy due to potential birth defects.
Olumiant Counseling: I discussed with the patient the risks of Olumiant therapy including but not limited to upper respiratory tract infections, shingles, cold sores, and nausea. Live vaccines should be avoided.  This medication has been linked to serious infections; higher rate of mortality; malignancy and lymphoproliferative disorders; major adverse cardiovascular events; thrombosis; gastrointestinal perforations; neutropenia; lymphopenia; anemia; liver enzyme elevations; and lipid elevations.
Cimzia Counseling:  I discussed with the patient the risks of Cimzia including but not limited to immunosuppression, allergic reactions and infections.  The patient understands that monitoring is required including a PPD at baseline and must alert us or the primary physician if symptoms of infection or other concerning signs are noted.
Xelelmaz Pregnancy And Lactation Text: This medication is Pregnancy Category D and is not considered safe during pregnancy.  The risk during breast feeding is also uncertain.
Ketoconazole Pregnancy And Lactation Text: This medication is Pregnancy Category C and it isn't know if it is safe during pregnancy. It is also excreted in breast milk and breast feeding isn't recommended.
Eucrisa Counseling: Patient may experience a mild burning sensation during topical application. Eucrisa is not approved in children less than 3 months of age.
Bactrim Pregnancy And Lactation Text: This medication is Pregnancy Category D and is known to cause fetal risk.  It is also excreted in breast milk.
Ilumya Counseling: I discussed with the patient the risks of tildrakizumab including but not limited to immunosuppression, malignancy, posterior leukoencephalopathy syndrome, and serious infections.  The patient understands that monitoring is required including a PPD at baseline and must alert us or the primary physician if symptoms of infection or other concerning signs are noted.
Zyclara Pregnancy And Lactation Text: This medication is Pregnancy Category C. It is unknown if this medication is excreted in breast milk.
Minoxidil Pregnancy And Lactation Text: This medication has not been assigned a Pregnancy Risk Category but animal studies failed to show danger with the topical medication. It is unknown if the medication is excreted in breast milk.
Colchicine Pregnancy And Lactation Text: This medication is Pregnancy Category C and isn't considered safe during pregnancy. It is excreted in breast milk.
Simponi Pregnancy And Lactation Text: The risk during pregnancy and breastfeeding is uncertain with this medication.
Spironolactone Counseling: Patient advised regarding risks of diarrhea, abdominal pain, hyperkalemia, birth defects (for female patients), liver toxicity and renal toxicity. The patient may need blood work to monitor liver and kidney function and potassium levels while on therapy. The patient verbalized understanding of the proper use and possible adverse effects of spironolactone.  All of the patient's questions and concerns were addressed.
Calcipotriene Counseling:  I discussed with the patient the risks of calcipotriene including but not limited to erythema, scaling, itching, and irritation.
Wartpeel Counseling:  I discussed with the patient the risks of Wartpeel including but not limited to erythema, scaling, itching, weeping, crusting, and pain.
Tranexamic Acid Counseling:  Patient advised of the small risk of bleeding problems with tranexamic acid. They were also instructed to call if they developed any nausea, vomiting or diarrhea. All of the patient's questions and concerns were addressed.
Metronidazole Pregnancy And Lactation Text: This medication is Pregnancy Category B and considered safe during pregnancy.  It is also excreted in breast milk.
Qbrexza Pregnancy And Lactation Text: There is no available data on Qbrexza use in pregnant women.  There is no available data on Qbrexza use in lactation.
Low Dose Naltrexone Pregnancy And Lactation Text: Naltrexone is pregnancy category C.  There have been no adequate and well-controlled studies in pregnant women.  It should be used in pregnancy only if the potential benefit justifies the potential risk to the fetus.   Limited data indicates that naltrexone is minimally excreted into breastmilk.
Olumiant Pregnancy And Lactation Text: Based on animal studies, Olumiant may cause embryo-fetal harm when administered to pregnant women.  The medication should not be used in pregnancy.  Breastfeeding is not recommended during treatment.
Skyrizi Counseling: I discussed with the patient the risks of risankizumab-rzaa including but not limited to immunosuppression, and serious infections.  The patient understands that monitoring is required including a PPD at baseline and must alert us or the primary physician if symptoms of infection or other concerning signs are noted.
Niacinamide Counseling: I recommended taking niacin or niacinamide, also know as vitamin B3, twice daily. Recent evidence suggests that taking vitamin B3 (500 mg twice daily) can reduce the risk of actinic keratoses and non-melanoma skin cancers. Side effects of vitamin B3 include flushing and headache.
Cephalexin Counseling: I counseled the patient regarding use of cephalexin as an antibiotic for prophylactic and/or therapeutic purposes. Cephalexin (commonly prescribed under brand name Keflex) is a cephalosporin antibiotic which is active against numerous classes of bacteria, including most skin bacteria. Side effects may include nausea, diarrhea, gastrointestinal upset, rash, hives, yeast infections, and in rare cases, hepatitis, kidney disease, seizures, fever, confusion, neurologic symptoms, and others. Patients with severe allergies to penicillin medications are cautioned that there is about a 10% incidence of cross-reactivity with cephalosporins. When possible, patients with penicillin allergies should use alternatives to cephalosporins for antibiotic therapy.
Albendazole Counseling:  I discussed with the patient the risks of albendazole including but not limited to cytopenia, kidney damage, nausea/vomiting and severe allergy.  The patient understands that this medication is being used in an off-label manner.
Dapsone Counseling: I discussed with the patient the risks of dapsone including but not limited to hemolytic anemia, agranulocytosis, rashes, methemoglobinemia, kidney failure, peripheral neuropathy, headaches, GI upset, and liver toxicity.  Patients who start dapsone require monitoring including baseline LFTs and weekly CBCs for the first month, then every month thereafter.  The patient verbalized understanding of the proper use and possible adverse effects of dapsone.  All of the patient's questions and concerns were addressed.
Spironolactone Pregnancy And Lactation Text: This medication can cause feminization of the male fetus and should be avoided during pregnancy. The active metabolite is also found in breast milk.
Mirvaso Counseling: Mirvaso is a topical medication which can decrease superficial blood flow where applied. Side effects are uncommon and include stinging, redness and allergic reactions.
Tazorac Pregnancy And Lactation Text: This medication is not safe during pregnancy. It is unknown if this medication is excreted in breast milk.
Tetracycline Pregnancy And Lactation Text: This medication is Pregnancy Category D and not consider safe during pregnancy. It is also excreted in breast milk.
Methotrexate Counseling:  Patient counseled regarding adverse effects of methotrexate including but not limited to nausea, vomiting, abnormalities in liver function tests. Patients may develop mouth sores, rash, diarrhea, and abnormalities in blood counts. The patient understands that monitoring is required including LFT's and blood counts.  There is a rare possibility of scarring of the liver and lung problems that can occur when taking methotrexate. Persistent nausea, loss of appetite, pale stools, dark urine, cough, and shortness of breath should be reported immediately. Patient advised to discontinue methotrexate treatment at least three months before attempting to become pregnant.  I discussed the need for folate supplements while taking methotrexate.  These supplements can decrease side effects during methotrexate treatment. The patient verbalized understanding of the proper use and possible adverse effects of methotrexate.  All of the patient's questions and concerns were addressed.
Otezla Pregnancy And Lactation Text: This medication is Pregnancy Category C and it isn't known if it is safe during pregnancy. It is unknown if it is excreted in breast milk.
Cimzia Pregnancy And Lactation Text: This medication crosses the placenta but can be considered safe in certain situations. Cimzia may be excreted in breast milk.
Terbinafine Counseling: Patient counseling regarding adverse effects of terbinafine including but not limited to headache, diarrhea, rash, upset stomach, liver function test abnormalities, itching, taste/smell disturbance, nausea, abdominal pain, and flatulence.  There is a rare possibility of liver failure that can occur when taking terbinafine.  The patient understands that a baseline LFT and kidney function test may be required. The patient verbalized understanding of the proper use and possible adverse effects of terbinafine.  All of the patient's questions and concerns were addressed.
Dutasteride Male Counseling: Dustasteride Counseling:  I discussed with the patient the risks of use of dutasteride including but not limited to decreased libido, decreased ejaculate volume, and gynecomastia. Women who can become pregnant should not handle medication.  All of the patient's questions and concerns were addressed.
Cephalexin Pregnancy And Lactation Text: This medication is Pregnancy Category B and considered safe during pregnancy.  It is also excreted in breast milk but can be used safely for shorter doses.
Tranexamic Acid Pregnancy And Lactation Text: It is unknown if this medication is safe during pregnancy or breast feeding.
Dapsone Pregnancy And Lactation Text: This medication is Pregnancy Category C and is not considered safe during pregnancy or breast feeding.
Aklief counseling:  Patient advised to apply a pea-sized amount only at bedtime and wait 30 minutes after washing their face before applying.  If too drying, patient may add a non-comedogenic moisturizer.  The most commonly reported side effects including irritation, redness, scaling, dryness, stinging, burning, itching, and increased risk of sunburn.  The patient verbalized understanding of the proper use and possible adverse effects of retinoids.  All of the patient's questions and concerns were addressed.
Cosentyx Counseling:  I discussed with the patient the risks of Cosentyx including but not limited to worsening of Crohn's disease, immunosuppression, allergic reactions and infections.  The patient understands that monitoring is required including a PPD at baseline and must alert us or the primary physician if symptoms of infection or other concerning signs are noted.
Mirvaso Pregnancy And Lactation Text: This medication has not been assigned a Pregnancy Risk Category. It is unknown if the medication is excreted in breast milk.
Opioid Counseling: I discussed with the patient the potential side effects of opioids including but not limited to addiction, altered mental status, and depression. I stressed avoiding alcohol, benzodiazepines, muscle relaxants and sleep aids unless specifically okayed by a physician. The patient verbalized understanding of the proper use and possible adverse effects of opioids. All of the patient's questions and concerns were addressed. They were instructed to flush the remaining pills down the toilet if they did not need them for pain.
Xolair Counseling:  Patient informed of potential adverse effects including but not limited to fever, muscle aches, rash and allergic reactions.  The patient verbalized understanding of the proper use and possible adverse effects of Xolair.  All of the patient's questions and concerns were addressed.
Methotrexate Pregnancy And Lactation Text: This medication is Pregnancy Category X and is known to cause fetal harm. This medication is excreted in breast milk.
Albendazole Pregnancy And Lactation Text: This medication is Pregnancy Category C and it isn't known if it is safe during pregnancy. It is also excreted in breast milk.
Wartpeel Pregnancy And Lactation Text: This medication is Pregnancy Category X and contraindicated in pregnancy and in women who may become pregnant. It is unknown if this medication is excreted in breast milk.
Minocycline Counseling: Patient advised regarding possible photosensitivity and discoloration of the teeth, skin, lips, tongue and gums.  Patient instructed to avoid sunlight, if possible.  When exposed to sunlight, patients should wear protective clothing, sunglasses, and sunscreen.  The patient was instructed to call the office immediately if the following severe adverse effects occur:  hearing changes, easy bruising/bleeding, severe headache, or vision changes.  The patient verbalized understanding of the proper use and possible adverse effects of minocycline.  All of the patient's questions and concerns were addressed.
Topical Ketoconazole Counseling: Patient counseled that this medication may cause skin irritation or allergic reactions.  In the event of skin irritation, the patient was advised to reduce the amount of the drug applied or use it less frequently.   The patient verbalized understanding of the proper use and possible adverse effects of ketoconazole.  All of the patient's questions and concerns were addressed.
Rhofade Counseling: Rhofade is a topical medication which can decrease superficial blood flow where applied. Side effects are uncommon and include stinging, redness and allergic reactions.
Calcipotriene Pregnancy And Lactation Text: The use of this medication during pregnancy or lactation is not recommended as there is insufficient data.
Acitretin Counseling:  I discussed with the patient the risks of acitretin including but not limited to hair loss, dry lips/skin/eyes, liver damage, hyperlipidemia, depression/suicidal ideation, photosensitivity.  Serious rare side effects can include but are not limited to pancreatitis, pseudotumor cerebri, bony changes, clot formation/stroke/heart attack.  Patient understands that alcohol is contraindicated since it can result in liver toxicity and significantly prolong the elimination of the drug by many years.
Dutasteride Female Counseling: Dutasteride Counseling:  I discussed with the patient the risks of use of dutasteride including but not limited to decreased libido and sexual dysfunction. Explained the teratogenic nature of the medication and stressed the importance of not getting pregnant during treatment. All of the patient's questions and concerns were addressed.
Hydroquinone Counseling:  Patient advised that medication may result in skin irritation, lightening (hypopigmentation), dryness, and burning.  In the event of skin irritation, the patient was advised to reduce the amount of the drug applied or use it less frequently.  Rarely, spots that are treated with hydroquinone can become darker (pseudoochronosis).  Should this occur, patient instructed to stop medication and call the office. The patient verbalized understanding of the proper use and possible adverse effects of hydroquinone.  All of the patient's questions and concerns were addressed.
Niacinamide Pregnancy And Lactation Text: These medications are considered safe during pregnancy.
Azathioprine Counseling:  I discussed with the patient the risks of azathioprine including but not limited to myelosuppression, immunosuppression, hepatotoxicity, lymphoma, and infections.  The patient understands that monitoring is required including baseline LFTs, Creatinine, possible TPMP genotyping and weekly CBCs for the first month and then every 2 weeks thereafter.  The patient verbalized understanding of the proper use and possible adverse effects of azathioprine.  All of the patient's questions and concerns were addressed.
Cantharidin Counseling:  I discussed with the patient the risks of Cantharidin including but not limited to pain, redness, burning, itching, and blistering.
Fluconazole Counseling:  Patient counseled regarding adverse effects of fluconazole including but not limited to headache, diarrhea, nausea, upset stomach, liver function test abnormalities, taste disturbance, and stomach pain.  There is a rare possibility of liver failure that can occur when taking fluconazole.  The patient understands that monitoring of LFTs and kidney function test may be required, especially at baseline. The patient verbalized understanding of the proper use and possible adverse effects of fluconazole.  All of the patient's questions and concerns were addressed.
Detail Level: Simple
Terbinafine Pregnancy And Lactation Text: This medication is Pregnancy Category B and is considered safe during pregnancy. It is also excreted in breast milk and breast feeding isn't recommended.
Xolair Pregnancy And Lactation Text: This medication is Pregnancy Category B and is considered safe during pregnancy. This medication is excreted in breast milk.
Winlevi Counseling:  I discussed with the patient the risks of topical clascoterone including but not limited to erythema, scaling, itching, and stinging. Patient voiced their understanding.
Topical Clindamycin Counseling: Patient counseled that this medication may cause skin irritation or allergic reactions.  In the event of skin irritation, the patient was advised to reduce the amount of the drug applied or use it less frequently.   The patient verbalized understanding of the proper use and possible adverse effects of clindamycin.  All of the patient's questions and concerns were addressed.
Oxybutynin Counseling:  I discussed with the patient the risks of oxybutynin including but not limited to skin rash, drowsiness, dry mouth, difficulty urinating, and blurred vision.
Infliximab Counseling:  I discussed with the patient the risks of infliximab including but not limited to myelosuppression, immunosuppression, autoimmune hepatitis, demyelinating diseases, lymphoma, and serious infections.  The patient understands that monitoring is required including a PPD at baseline and must alert us or the primary physician if symptoms of infection or other concerning signs are noted.
Rinvoq Counseling: I discussed with the patient the risks of Rinvoq therapy including but not limited to upper respiratory tract infections, shingles, cold sores, bronchitis, nausea, cough, fever, acne, and headache. Live vaccines should be avoided.  This medication has been linked to serious infections; higher rate of mortality; malignancy and lymphoproliferative disorders; major adverse cardiovascular events; thrombosis; thrombocytopenia, anemia, and neutropenia; lipid elevations; liver enzyme elevations; and gastrointestinal perforations.
Aklief Pregnancy And Lactation Text: It is unknown if this medication is safe to use during pregnancy.  It is unknown if this medication is excreted in breast milk.  Breastfeeding women should use the topical cream on the smallest area of the skin for the shortest time needed while breastfeeding.  Do not apply to nipple and areola.
Prednisone Counseling:  I discussed with the patient the risks of prolonged use of prednisone including but not limited to weight gain, insomnia, osteoporosis, mood changes, diabetes, susceptibility to infection, glaucoma and high blood pressure.  In cases where prednisone use is prolonged, patients should be monitored with blood pressure checks, serum glucose levels and an eye exam.  Additionally, the patient may need to be placed on GI prophylaxis, PCP prophylaxis, and calcium and vitamin D supplementation and/or a bisphosphonate.  The patient verbalized understanding of the proper use and the possible adverse effects of prednisone.  All of the patient's questions and concerns were addressed.
Topical Ketoconazole Pregnancy And Lactation Text: This medication is Pregnancy Category B and is considered safe during pregnancy. It is unknown if it is excreted in breast milk.
Cosentyx Pregnancy And Lactation Text: This medication is Pregnancy Category B and is considered safe during pregnancy. It is unknown if this medication is excreted in breast milk.
Include Pregnancy/Lactation Warning?: No
Cantharidin Pregnancy And Lactation Text: This medication has not been proven safe during pregnancy. It is unknown if this medication is excreted in breast milk.
Opioid Pregnancy And Lactation Text: These medications can lead to premature delivery and should be avoided during pregnancy. These medications are also present in breast milk in small amounts.
Valtrex Counseling: I discussed with the patient the risks of valacyclovir including but not limited to kidney damage, nausea, vomiting and severe allergy.  The patient understands that if the infection seems to be worsening or is not improving, they are to call.
Ivermectin Counseling:  Patient instructed to take medication on an empty stomach with a full glass of water.  Patient informed of potential adverse effects including but not limited to nausea, diarrhea, dizziness, itching, and swelling of the extremities or lymph nodes.  The patient verbalized understanding of the proper use and possible adverse effects of ivermectin.  All of the patient's questions and concerns were addressed.
Gabapentin Counseling: I discussed with the patient the risks of gabapentin including but not limited to dizziness, somnolence, fatigue and ataxia.
Opzelura Counseling:  I discussed with the patient the risks of Opzelura including but not limited to nasopharngitis, bronchitis, ear infection, eosinophila, hives, diarrhea, folliculitis, tonsillitis, and rhinorrhea.  Taken orally, this medication has been linked to serious infections; higher rate of mortality; malignancy and lymphoproliferative disorders; major adverse cardiovascular events; thrombosis; thrombocytopenia, anemia, and neutropenia; and lipid elevations.
Clindamycin Counseling: I counseled the patient regarding use of clindamycin as an antibiotic for prophylactic and/or therapeutic purposes. Clindamycin is active against numerous classes of bacteria, including skin bacteria. Side effects may include nausea, diarrhea, gastrointestinal upset, rash, hives, yeast infections, and in rare cases, colitis.
Acitretin Pregnancy And Lactation Text: This medication is Pregnancy Category X and should not be given to women who are pregnant or may become pregnant in the future. This medication is excreted in breast milk.
Spevigo Counseling: I discussed with the patient the risks of Spevigo including but not limited to fatigue, nasuea, vomiting, headache, pruritus, urinary tract infection, an infusion related reactions.  The patient understands that monitoring is required including screening for tuberculosis at baseline and yearly screening thereafter while continuing Spevigo therapy. They should contact us if symptoms of infection or other concerning signs are noted.
Winlevi Pregnancy And Lactation Text: This medication is considered safe during pregnancy and breastfeeding.
5-Fu Counseling: 5-Fluorouracil Counseling:  I discussed with the patient the risks of 5-fluorouracil including but not limited to erythema, scaling, itching, weeping, crusting, and pain.
Cimetidine Counseling:  I discussed with the patient the risks of Cimetidine including but not limited to gynecomastia, headache, diarrhea, nausea, drowsiness, arrhythmias, pancreatitis, skin rashes, psychosis, bone marrow suppression and kidney toxicity.
Valtrex Pregnancy And Lactation Text: this medication is Pregnancy Category B and is considered safe during pregnancy. This medication is not directly found in breast milk but it's metabolite acyclovir is present.
Dutasteride Pregnancy And Lactation Text: This medication is absolutely contraindicated in women, especially during pregnancy and breast feeding. Feminization of male fetuses is possible if taking while pregnant.
Quinolones Counseling:  I discussed with the patient the risks of fluoroquinolones including but not limited to GI upset, allergic reaction, drug rash, diarrhea, dizziness, photosensitivity, yeast infections, liver function test abnormalities, tendonitis/tendon rupture.
Solaraze Counseling:  I discussed with the patient the risks of Solaraze including but not limited to erythema, scaling, itching, weeping, crusting, and pain.
Nsaids Counseling: NSAID Counseling: I discussed with the patient that NSAIDs should be taken with food. Prolonged use of NSAIDs can result in the development of stomach ulcers.  Patient advised to stop taking NSAIDs if abdominal pain occurs.  The patient verbalized understanding of the proper use and possible adverse effects of NSAIDs.  All of the patient's questions and concerns were addressed.
Azathioprine Pregnancy And Lactation Text: This medication is Pregnancy Category D and isn't considered safe during pregnancy. It is unknown if this medication is excreted in breast milk.
Fluconazole Pregnancy And Lactation Text: This medication is Pregnancy Category C and it isn't know if it is safe during pregnancy. It is also excreted in breast milk.
Propranolol Counseling:  I discussed with the patient the risks of propranolol including but not limited to low heart rate, low blood pressure, low blood sugar, restlessness and increased cold sensitivity. They should call the office if they experience any of these side effects.
Erivedge Counseling- I discussed with the patient the risks of Erivedge including but not limited to nausea, vomiting, diarrhea, constipation, weight loss, changes in the sense of taste, decreased appetite, muscle spasms, and hair loss.  The patient verbalized understanding of the proper use and possible adverse effects of Erivedge.  All of the patient's questions and concerns were addressed.
Imiquimod Counseling:  I discussed with the patient the risks of imiquimod including but not limited to erythema, scaling, itching, weeping, crusting, and pain.  Patient understands that the inflammatory response to imiquimod is variable from person to person and was educated regarded proper titration schedule.  If flu-like symptoms develop, patient knows to discontinue the medication and contact us.
Spevigo Pregnancy And Lactation Text: The risk during pregnancy and breastfeeding is uncertain with this medication. This medication does cross the placenta. It is unknown if this medication is found in breast milk.
Finasteride Male Counseling: Finasteride Counseling:  I discussed with the patient the risks of use of finasteride including but not limited to decreased libido, decreased ejaculate volume, gynecomastia, and depression. Women should not handle medication.  All of the patient's questions and concerns were addressed.
Topical Metronidazole Counseling: Metronidazole is a topical antibiotic medication. You may experience burning, stinging, redness, or allergic reactions.  Please call our office if you develop any problems from using this medication.
Nsaids Pregnancy And Lactation Text: These medications are considered safe up to 30 weeks gestation. It is excreted in breast milk.
Cellcept Counseling:  I discussed with the patient the risks of mycophenolate mofetil including but not limited to infection/immunosuppression, GI upset, hypokalemia, hypercholesterolemia, bone marrow suppression, lymphoproliferative disorders, malignancy, GI ulceration/bleed/perforation, colitis, interstitial lung disease, kidney failure, progressive multifocal leukoencephalopathy, and birth defects.  The patient understands that monitoring is required including a baseline creatinine and regular CBC testing. In addition, patient must alert us immediately if symptoms of infection or other concerning signs are noted.
Opzelura Pregnancy And Lactation Text: There is insufficient data to evaluate drug-associated risk for major birth defects, miscarriage, or other adverse maternal or fetal outcomes.  There is a pregnancy registry that monitors pregnancy outcomes in pregnant persons exposed to the medication during pregnancy.  It is unknown if this medication is excreted in breast milk.  Do not breastfeed during treatment and for about 4 weeks after the last dose.
Clindamycin Pregnancy And Lactation Text: This medication can be used in pregnancy if certain situations. Clindamycin is also present in breast milk.
Rituxan Counseling:  I discussed with the patient the risks of Rituxan infusions. Side effects can include infusion reactions, severe drug rashes including mucocutaneous reactions, reactivation of latent hepatitis and other infections and rarely progressive multifocal leukoencephalopathy.  All of the patient's questions and concerns were addressed.
Dupixent Counseling: I discussed with the patient the risks of dupilumab including but not limited to eye infection and irritation, cold sores, injection site reactions, worsening of asthma, allergic reactions and increased risk of parasitic infection.  Live vaccines should be avoided while taking dupilumab. Dupilumab will also interact with certain medications such as warfarin and cyclosporine. The patient understands that monitoring is required and they must alert us or the primary physician if symptoms of infection or other concerning signs are noted.
Bexarotene Counseling:  I discussed with the patient the risks of bexarotene including but not limited to hair loss, dry lips/skin/eyes, liver abnormalities, hyperlipidemia, pancreatitis, depression/suicidal ideation, photosensitivity, drug rash/allergic reactions, hypothyroidism, anemia, leukopenia, infection, cataracts, and teratogenicity.  Patient understands that they will need regular blood tests to check lipid profile, liver function tests, white blood cell count, thyroid function tests and pregnancy test if applicable.
Azelaic Acid Counseling: Patient counseled that medicine may cause skin irritation and to avoid applying near the eyes.  In the event of skin irritation, the patient was advised to reduce the amount of the drug applied or use it less frequently.   The patient verbalized understanding of the proper use and possible adverse effects of azelaic acid.  All of the patient's questions and concerns were addressed.
Arava Counseling:  Patient counseled regarding adverse effects of Arava including but not limited to nausea, vomiting, abnormalities in liver function tests. Patients may develop mouth sores, rash, diarrhea, and abnormalities in blood counts. The patient understands that monitoring is required including LFTs and blood counts.  There is a rare possibility of scarring of the liver and lung problems that can occur when taking methotrexate. Persistent nausea, loss of appetite, pale stools, dark urine, cough, and shortness of breath should be reported immediately. Patient advised to discontinue Arava treatment and consult with a physician prior to attempting conception. The patient will have to undergo a treatment to eliminate Arava from the body prior to conception.
Griseofulvin Counseling:  I discussed with the patient the risks of griseofulvin including but not limited to photosensitivity, cytopenia, liver damage, nausea/vomiting and severe allergy.  The patient understands that this medication is best absorbed when taken with a fatty meal (e.g., ice cream or french fries).
Doxycycline Counseling:  Patient counseled regarding possible photosensitivity and increased risk for sunburn.  Patient instructed to avoid sunlight, if possible.  When exposed to sunlight, patients should wear protective clothing, sunglasses, and sunscreen.  The patient was instructed to call the office immediately if the following severe adverse effects occur:  hearing changes, easy bruising/bleeding, severe headache, or vision changes.  The patient verbalized understanding of the proper use and possible adverse effects of doxycycline.  All of the patient's questions and concerns were addressed.
Bexarotene Pregnancy And Lactation Text: This medication is Pregnancy Category X and should not be given to women who are pregnant or may become pregnant. This medication should not be used if you are breast feeding.
Glycopyrrolate Counseling:  I discussed with the patient the risks of glycopyrrolate including but not limited to skin rash, drowsiness, dry mouth, difficulty urinating, and blurred vision.
Azelaic Acid Pregnancy And Lactation Text: This medication is considered safe during pregnancy and breast feeding.
Picato Counseling:  I discussed with the patient the risks of Picato including but not limited to erythema, scaling, itching, weeping, crusting, and pain.
VTAMA Counseling: I discussed with the patient that VTAMA is not for use in the eyes, mouth or mouth. They should call the office if they develop any signs of allergic reactions to VTAMA. The patient verbalized understanding of the proper use and possible adverse effects of VTAMA.  All of the patient's questions and concerns were addressed.
Stelara Counseling:  I discussed with the patient the risks of ustekinumab including but not limited to immunosuppression, malignancy, posterior leukoencephalopathy syndrome, and serious infections.  The patient understands that monitoring is required including a PPD at baseline and must alert us or the primary physician if symptoms of infection or other concerning signs are noted.
Solaraze Pregnancy And Lactation Text: This medication is Pregnancy Category B and is considered safe. There is some data to suggest avoiding during the third trimester. It is unknown if this medication is excreted in breast milk.
Topical Metronidazole Pregnancy And Lactation Text: This medication is Pregnancy Category B and considered safe during pregnancy.  It is also considered safe to use while breastfeeding.
Arava Pregnancy And Lactation Text: This medication is Pregnancy Category X and is absolutely contraindicated during pregnancy. It is unknown if it is excreted in breast milk.
Rifampin Counseling: I discussed with the patient the risks of rifampin including but not limited to liver damage, kidney damage, red-orange body fluids, nausea/vomiting and severe allergy.
Adbry Counseling: I discussed with the patient the risks of tralokinumab including but not limited to eye infection and irritation, cold sores, injection site reactions, worsening of asthma, allergic reactions and increased risk of parasitic infection.  Live vaccines should be avoided while taking tralokinumab. The patient understands that monitoring is required and they must alert us or the primary physician if symptoms of infection or other concerning signs are noted.
Propranolol Pregnancy And Lactation Text: This medication is Pregnancy Category C and it isn't known if it is safe during pregnancy. It is excreted in breast milk.
Dupixent Pregnancy And Lactation Text: This medication likely crosses the placenta but the risk for the fetus is uncertain. This medication is excreted in breast milk.
Rituxan Pregnancy And Lactation Text: This medication is Pregnancy Category C and it isn't know if it is safe during pregnancy. It is unknown if this medication is excreted in breast milk but similar antibodies are known to be excreted.
Finasteride Female Counseling: Finasteride Counseling:  I discussed with the patient the risks of use of finasteride including but not limited to decreased libido and sexual dysfunction. Explained the teratogenic nature of the medication and stressed the importance of not getting pregnant during treatment. All of the patient's questions and concerns were addressed.
Topical Steroids Counseling: I discussed with the patient that prolonged use of topical steroids can result in the increased appearance of superficial blood vessels (telangiectasias), lightening (hypopigmentation) and thinning of the skin (atrophy).  Patient understands to avoid using high potency steroids in skin folds, the groin or the face.  The patient verbalized understanding of the proper use and possible adverse effects of topical steroids.  All of the patient's questions and concerns were addressed.
Enbrel Counseling:  I discussed with the patient the risks of etanercept including but not limited to myelosuppression, immunosuppression, autoimmune hepatitis, demyelinating diseases, lymphoma, and infections.  The patient understands that monitoring is required including a PPD at baseline and must alert us or the primary physician if symptoms of infection or other concerning signs are noted.
Humira Counseling:  I discussed with the patient the risks of adalimumab including but not limited to myelosuppression, immunosuppression, autoimmune hepatitis, demyelinating diseases, lymphoma, and serious infections.  The patient understands that monitoring is required including a PPD at baseline and must alert us or the primary physician if symptoms of infection or other concerning signs are noted.
Vtama Pregnancy And Lactation Text: It is unknown if this medication can cause problems during pregnancy and breastfeeding.
SSKI Counseling:  I discussed with the patient the risks of SSKI including but not limited to thyroid abnormalities, metallic taste, GI upset, fever, headache, acne, arthralgias, paraesthesias, lymphadenopathy, easy bleeding, arrhythmias, and allergic reaction.
Olanzapine Counseling- I discussed with the patient the common side effects of olanzapine including but are not limited to: lack of energy, dry mouth, increased appetite, sleepiness, tremor, constipation, dizziness, changes in behavior, or restlessness.  Explained that teenagers are more likely to experience headaches, abdominal pain, pain in the arms or legs, tiredness, and sleepiness.  Serious side effects include but are not limited: increased risk of death in elderly patients who are confused, have memory loss, or dementia-related psychosis; hyperglycemia; increased cholesterol and triglycerides; and weight gain.
Cibinqo Counseling: I discussed with the patient the risks of Cibinqo therapy including but not limited to common cold, nausea, headache, cold sores, increased blood CPK levels, dizziness, UTIs, fatigue, acne, and vomitting. Live vaccines should be avoided.  This medication has been linked to serious infections; higher rate of mortality; malignancy and lymphoproliferative disorders; major adverse cardiovascular events; thrombosis; thrombocytopenia and lymphopenia; lipid elevations; and retinal detachment.
Griseofulvin Pregnancy And Lactation Text: This medication is Pregnancy Category X and is known to cause serious birth defects. It is unknown if this medication is excreted in breast milk but breast feeding should be avoided.
Rinvoq Pregnancy And Lactation Text: Based on animal studies, Rinvoq may cause embryo-fetal harm when administered to pregnant women.  The medication should not be used in pregnancy.  Breastfeeding is not recommended during treatment and for 6 days after the last dose.
Drysol Counseling:  I discussed with the patient the risks of drysol/aluminum chloride including but not limited to skin rash, itching, irritation, burning.
Soolantra Counseling: I discussed with the patients the risks of topial Soolantra. This is a medicine which decreases the number of mites and inflammation in the skin. You experience burning, stinging, eye irritation or allergic reactions.  Please call our office if you develop any problems from using this medication.
Doxycycline Pregnancy And Lactation Text: This medication is Pregnancy Category D and not consider safe during pregnancy. It is also excreted in breast milk but is considered safe for shorter treatment courses.
Isotretinoin Counseling: Patient should get monthly blood tests, not donate blood, not drive at night if vision affected, not share medication, and not undergo elective surgery for 6 months after tx completed. Side effects reviewed, pt to contact office should one occur.
Glycopyrrolate Pregnancy And Lactation Text: This medication is Pregnancy Category B and is considered safe during pregnancy. It is unknown if it is excreted breast milk.
Doxepin Counseling:  Patient advised that the medication is sedating and not to drive a car after taking this medication. Patient informed of potential adverse effects including but not limited to dry mouth, urinary retention, and blurry vision.  The patient verbalized understanding of the proper use and possible adverse effects of doxepin.  All of the patient's questions and concerns were addressed.
Benzoyl Peroxide Counseling: Patient counseled that medicine may cause skin irritation and bleach clothing.  In the event of skin irritation, the patient was advised to reduce the amount of the drug applied or use it less frequently.   The patient verbalized understanding of the proper use and possible adverse effects of benzoyl peroxide.  All of the patient's questions and concerns were addressed.
Adbry Pregnancy And Lactation Text: It is unknown if this medication will adversely affect pregnancy or breast feeding.
Olanzapine Pregnancy And Lactation Text: This medication is pregnancy category C.   There are no adequate and well controlled trials with olanzapine in pregnant females.  Olanzapine should be used during pregnancy only if the potential benefit justifies the potential risk to the fetus.   In a study in lactating healthy women, olanzapine was excreted in breast milk.  It is recommended that women taking olanzapine should not breast feed.
Itraconazole Counseling:  I discussed with the patient the risks of itraconazole including but not limited to liver damage, nausea/vomiting, neuropathy, and severe allergy.  The patient understands that this medication is best absorbed when taken with acidic beverages such as non-diet cola or ginger ale.  The patient understands that monitoring is required including baseline LFTs and repeat LFTs at intervals.  The patient understands that they are to contact us or the primary physician if concerning signs are noted.
Siliq Counseling:  I discussed with the patient the risks of Siliq including but not limited to new or worsening depression, suicidal thoughts and behavior, immunosuppression, malignancy, posterior leukoencephalopathy syndrome, and serious infections.  The patient understands that monitoring is required including a PPD at baseline and must alert us or the primary physician if symptoms of infection or other concerning signs are noted. There is also a special program designed to monitor depression which is required with Siliq.
Cibinqo Pregnancy And Lactation Text: It is unknown if this medication will adversely affect pregnancy or breast feeding.  You should not take this medication if you are currently pregnant or planning a pregnancy or while breastfeeding.
Soolantra Pregnancy And Lactation Text: This medication is Pregnancy Category C. This medication is considered safe during breast feeding.
Sotyktu Counseling:  I discussed the most common side effects of Sotyktu including: common cold, sore throat, sinus infections, cold sores, canker sores, folliculitis, and acne.  I also discussed more serious side effects of Sotyktu including but not limited to: serious allergic reactions; increased risk for infections such as TB; cancers such as lymphomas; rhabdomyolysis and elevated CPK; and elevated triglycerides and liver enzymes. 
Doxepin Pregnancy And Lactation Text: This medication is Pregnancy Category C and it isn't known if it is safe during pregnancy. It is also excreted in breast milk and breast feeding isn't recommended.
Clofazimine Counseling:  I discussed with the patient the risks of clofazimine including but not limited to skin and eye pigmentation, liver damage, nausea/vomiting, gastrointestinal bleeding and allergy.
Libtayo Counseling- I discussed with the patient the risks of Libtayo including but not limited to nausea, vomiting, diarrhea, and bone or muscle pain.  The patient verbalized understanding of the proper use and possible adverse effects of Libtayo.  All of the patient's questions and concerns were addressed.
Azithromycin Counseling:  I discussed with the patient the risks of azithromycin including but not limited to GI upset, allergic reaction, drug rash, diarrhea, and yeast infections.
Finasteride Pregnancy And Lactation Text: This medication is absolutely contraindicated during pregnancy. It is unknown if it is excreted in breast milk.
Klisyri Counseling:  I discussed with the patient the risks of Klisyri including but not limited to erythema, scaling, itching, weeping, crusting, and pain.
Zoryve Counseling:  I discussed with the patient that Zoryve is not for use in the eyes, mouth or vagina. The most commonly reported side effects include diarrhea, headache, insomnia, application site pain, upper respiratory tract infections, and urinary tract infections.  All of the patient's questions and concerns were addressed.
Cyclophosphamide Counseling:  I discussed with the patient the risks of cyclophosphamide including but not limited to hair loss, hormonal abnormalities, decreased fertility, abdominal pain, diarrhea, nausea and vomiting, bone marrow suppression and infection. The patient understands that monitoring is required while taking this medication.
Rifampin Pregnancy And Lactation Text: This medication is Pregnancy Category C and it isn't know if it is safe during pregnancy. It is also excreted in breast milk and should not be used if you are breast feeding.
Topical Steroids Applications Pregnancy And Lactation Text: Most topical steroids are considered safe to use during pregnancy and lactation.  Any topical steroid applied to the breast or nipple should be washed off before breastfeeding.
Sski Pregnancy And Lactation Text: This medication is Pregnancy Category D and isn't considered safe during pregnancy. It is excreted in breast milk.
Benzoyl Peroxide Pregnancy And Lactation Text: This medication is Pregnancy Category C. It is unknown if benzoyl peroxide is excreted in breast milk.
Azithromycin Pregnancy And Lactation Text: This medication is considered safe during pregnancy and is also secreted in breast milk.
Libtayo Pregnancy And Lactation Text: This medication is contraindicated in pregnancy and when breast feeding.
Birth Control Pills Counseling: Birth Control Pill Counseling: I discussed with the patient the potential side effects of OCPs including but not limited to increased risk of stroke, heart attack, thrombophlebitis, deep venous thrombosis, hepatic adenomas, breast changes, GI upset, headaches, and depression.  The patient verbalized understanding of the proper use and possible adverse effects of OCPs. All of the patient's questions and concerns were addressed.
Klisyri Pregnancy And Lactation Text: It is unknown if this medication can harm a developing fetus or if it is excreted in breast milk.
Erythromycin Counseling:  I discussed with the patient the risks of erythromycin including but not limited to GI upset, allergic reaction, drug rash, diarrhea, increase in liver enzymes, and yeast infections.
Isotretinoin Pregnancy And Lactation Text: This medication is Pregnancy Category X and is considered extremely dangerous during pregnancy. It is unknown if it is excreted in breast milk.
Protopic Counseling: Patient may experience a mild burning sensation during topical application. Protopic is not approved in children less than 2 years of age. There have been case reports of hematologic and skin malignancies in patients using topical calcineurin inhibitors although causality is questionable.
Hydroxychloroquine Counseling:  I discussed with the patient that a baseline ophthalmologic exam is needed at the start of therapy and every year thereafter while on therapy. A CBC may also be warranted for monitoring.  The side effects of this medication were discussed with the patient, including but not limited to agranulocytosis, aplastic anemia, seizures, rashes, retinopathy, and liver toxicity. Patient instructed to call the office should any adverse effect occur.  The patient verbalized understanding of the proper use and possible adverse effects of Plaquenil.  All the patient's questions and concerns were addressed.
Taltz Counseling: I discussed with the patient the risks of ixekizumab including but not limited to immunosuppression, serious infections, worsening of inflammatory bowel disease and drug reactions.  The patient understands that monitoring is required including a PPD at baseline and must alert us or the primary physician if symptoms of infection or other concerning signs are noted.
Sotyktu Pregnancy And Lactation Text: There is insufficient data to evaluate whether or not Sotyktu is safe to use during pregnancy.   It is not known if Sotyktu passes into breast milk and whether or not it is safe to use when breastfeeding.  
Hydroxyzine Counseling: Patient advised that the medication is sedating and not to drive a car after taking this medication.  Patient informed of potential adverse effects including but not limited to dry mouth, urinary retention, and blurry vision.  The patient verbalized understanding of the proper use and possible adverse effects of hydroxyzine.  All of the patient's questions and concerns were addressed.
Erythromycin Pregnancy And Lactation Text: This medication is Pregnancy Category B and is considered safe during pregnancy. It is also excreted in breast milk.
High Dose Vitamin A Counseling: Side effects reviewed, pt to contact office should one occur.
Sarecycline Counseling: Patient advised regarding possible photosensitivity and discoloration of the teeth, skin, lips, tongue and gums.  Patient instructed to avoid sunlight, if possible.  When exposed to sunlight, patients should wear protective clothing, sunglasses, and sunscreen.  The patient was instructed to call the office immediately if the following severe adverse effects occur:  hearing changes, easy bruising/bleeding, severe headache, or vision changes.  The patient verbalized understanding of the proper use and possible adverse effects of sarecycline.  All of the patient's questions and concerns were addressed.
Cyclophosphamide Pregnancy And Lactation Text: This medication is Pregnancy Category D and it isn't considered safe during pregnancy. This medication is excreted in breast milk.
Topical Retinoid counseling:  Patient advised to apply a pea-sized amount only at bedtime and wait 30 minutes after washing their face before applying.  If too drying, patient may add a non-comedogenic moisturizer. The patient verbalized understanding of the proper use and possible adverse effects of retinoids.  All of the patient's questions and concerns were addressed.
Oral Minoxidil Counseling- I discussed with the patient the risks of oral minoxidil including but not limited to shortness of breath, swelling of the feet or ankles, dizziness, lightheadedness, unwanted hair growth and allergic reaction.  The patient verbalized understanding of the proper use and possible adverse effects of oral minoxidil.  All of the patient's questions and concerns were addressed.
Litfulo Counseling: I discussed with the patient the risks of Litfulo therapy including but not limited to upper respiratory tract infections, shingles, cold sores, and nausea. Live vaccines should be avoided.  This medication has been linked to serious infections; higher rate of mortality; malignancy and lymphoproliferative disorders; major adverse cardiovascular events; thrombosis; gastrointestinal perforations; neutropenia; lymphopenia; anemia; liver enzyme elevations; and lipid elevations.
Elidel Counseling: Patient may experience a mild burning sensation during topical application. Elidel is not approved in children less than 2 years of age. There have been case reports of hematologic and skin malignancies in patients using topical calcineurin inhibitors although causality is questionable.
Bimzelx Counseling:  I discussed with the patient the risks of Bimzelx including but not limited to depression, immunosuppression, allergic reactions and infections.  The patient understands that monitoring is required including a PPD at baseline and must alert us or the primary physician if symptoms of infection or other concerning signs are noted.
Hyrimoz Counseling:  I discussed with the patient the risks of adalimumab including but not limited to myelosuppression, immunosuppression, autoimmune hepatitis, demyelinating diseases, lymphoma, and serious infections.  The patient understands that monitoring is required including a PPD at baseline and must alert us or the primary physician if symptoms of infection or other concerning signs are noted.
Colchicine Counseling:  Patient counseled regarding adverse effects including but not limited to stomach upset (nausea, vomiting, stomach pain, or diarrhea).  Patient instructed to limit alcohol consumption while taking this medication.  Colchicine may reduce blood counts especially with prolonged use.  The patient understands that monitoring of kidney function and blood counts may be required, especially at baseline. The patient verbalized understanding of the proper use and possible adverse effects of colchicine.  All of the patient's questions and concerns were addressed.
Bimzelx Pregnancy And Lactation Text: This medication crosses the placenta and the safety is uncertain during pregnancy. It is unknown if this medication is present in breast milk.
Topical Sulfur Applications Counseling: Topical Sulfur Counseling: Patient counseled that this medication may cause skin irritation or allergic reactions.  In the event of skin irritation, the patient was advised to reduce the amount of the drug applied or use it less frequently.   The patient verbalized understanding of the proper use and possible adverse effects of topical sulfur application.  All of the patient's questions and concerns were addressed.
Cyclosporine Counseling:  I discussed with the patient the risks of cyclosporine including but not limited to hypertension, gingival hyperplasia,myelosuppression, immunosuppression, liver damage, kidney damage, neurotoxicity, lymphoma, and serious infections. The patient understands that monitoring is required including baseline blood pressure, CBC, CMP, lipid panel and uric acid, and then 1-2 times monthly CMP and blood pressure.
Oral Minoxidil Pregnancy And Lactation Text: This medication should only be used when clearly needed if you are pregnant, attempting to become pregnant or breast feeding.
Protopic Pregnancy And Lactation Text: This medication is Pregnancy Category C. It is unknown if this medication is excreted in breast milk when applied topically.
Hydroxychloroquine Pregnancy And Lactation Text: This medication has been shown to cause fetal harm but it isn't assigned a Pregnancy Risk Category. There are small amounts excreted in breast milk.
Simponi Counseling:  I discussed with the patient the risks of golimumab including but not limited to myelosuppression, immunosuppression, autoimmune hepatitis, demyelinating diseases, lymphoma, and serious infections.  The patient understands that monitoring is required including a PPD at baseline and must alert us or the primary physician if symptoms of infection or other concerning signs are noted.
Carac Counseling:  I discussed with the patient the risks of Carac including but not limited to erythema, scaling, itching, weeping, crusting, and pain.
Bactrim Counseling:  I discussed with the patient the risks of sulfa antibiotics including but not limited to GI upset, allergic reaction, drug rash, diarrhea, dizziness, photosensitivity, and yeast infections.  Rarely, more serious reactions can occur including but not limited to aplastic anemia, agranulocytosis, methemoglobinemia, blood dyscrasias, liver or kidney failure, lung infiltrates or desquamative/blistering drug rashes.
Thalidomide Counseling: I discussed with the patient the risks of thalidomide including but not limited to birth defects, anxiety, weakness, chest pain, dizziness, cough and severe allergy.
Odomzo Counseling- I discussed with the patient the risks of Odomzo including but not limited to nausea, vomiting, diarrhea, constipation, weight loss, changes in the sense of taste, decreased appetite, muscle spasms, and hair loss.  The patient verbalized understanding of the proper use and possible adverse effects of Odomzo.  All of the patient's questions and concerns were addressed.
Minoxidil Counseling: Minoxidil is a topical medication which can increase blood flow where it is applied. It is uncertain how this medication increases hair growth. Side effects are uncommon and include stinging and allergic reactions.
Birth Control Pills Pregnancy And Lactation Text: This medication should be avoided if pregnant and for the first 30 days post-partum.
Low Dose Naltrexone Counseling- I discussed with the patient the potential risks and side effects of low dose naltrexone including but not limited to: more vivid dreams, headaches, nausea, vomiting, abdominal pain, fatigue, dizziness, and anxiety.
Qbrexza Counseling:  I discussed with the patient the risks of Qbrexza including but not limited to headache, mydriasis, blurred vision, dry eyes, nasal dryness, dry mouth, dry throat, dry skin, urinary hesitation, and constipation.  Local skin reactions including erythema, burning, stinging, and itching can also occur.
Zyclara Counseling:  I discussed with the patient the risks of imiquimod including but not limited to erythema, scaling, itching, weeping, crusting, and pain.  Patient understands that the inflammatory response to imiquimod is variable from person to person and was educated regarded proper titration schedule.  If flu-like symptoms develop, patient knows to discontinue the medication and contact us.
Litfulo Pregnancy And Lactation Text: Based on animal studies, Lifulo may cause embryo-fetal harm when administered to pregnant women.  The medication should not be used in pregnancy.  Breastfeeding is not recommended during treatment.
Tremfya Counseling: I discussed with the patient the risks of guselkumab including but not limited to immunosuppression, serious infections, and drug reactions.  The patient understands that monitoring is required including a PPD at baseline and must alert us or the primary physician if symptoms of infection or other concerning signs are noted.
Xeljanz Counseling: I discussed with the patient the risks of Xeljanz therapy including increased risk of infection, liver issues, headache, diarrhea, or cold symptoms. Live vaccines should be avoided. They were instructed to call if they have any problems.
Ketoconazole Counseling:   Patient counseled regarding improving absorption with orange juice.  Adverse effects include but are not limited to breast enlargement, headache, diarrhea, nausea, upset stomach, liver function test abnormalities, taste disturbance, and stomach pain.  There is a rare possibility of liver failure that can occur when taking ketoconazole. The patient understands that monitoring of LFTs may be required, especially at baseline. The patient verbalized understanding of the proper use and possible adverse effects of ketoconazole.  All of the patient's questions and concerns were addressed.
Metronidazole Counseling:  I discussed with the patient the risks of metronidazole including but not limited to seizures, nausea/vomiting, a metallic taste in the mouth, nausea/vomiting and severe allergy.
High Dose Vitamin A Pregnancy And Lactation Text: High dose vitamin A therapy is contraindicated during pregnancy and breast feeding.
Topical Sulfur Applications Pregnancy And Lactation Text: This medication is considered safe during pregnancy and breast feeding secondary to limited systemic absorption.
Hydroxyzine Pregnancy And Lactation Text: This medication is not safe during pregnancy and should not be taken. It is also excreted in breast milk and breast feeding isn't recommended.

## 2025-01-21 ENCOUNTER — APPOINTMENT (OUTPATIENT)
Dept: RADIOLOGY | Facility: MEDICAL CENTER | Age: 35
End: 2025-01-21
Attending: EMERGENCY MEDICINE
Payer: COMMERCIAL

## 2025-01-21 ENCOUNTER — HOSPITAL ENCOUNTER (EMERGENCY)
Facility: MEDICAL CENTER | Age: 35
End: 2025-01-21
Attending: EMERGENCY MEDICINE
Payer: COMMERCIAL

## 2025-01-21 VITALS
SYSTOLIC BLOOD PRESSURE: 135 MMHG | DIASTOLIC BLOOD PRESSURE: 89 MMHG | WEIGHT: 165.12 LBS | HEIGHT: 70 IN | RESPIRATION RATE: 20 BRPM | TEMPERATURE: 97.6 F | OXYGEN SATURATION: 96 % | BODY MASS INDEX: 23.64 KG/M2 | HEART RATE: 86 BPM

## 2025-01-21 DIAGNOSIS — F15.10 METHAMPHETAMINE USE (HCC): ICD-10-CM

## 2025-01-21 DIAGNOSIS — R07.9 CHEST PAIN, UNSPECIFIED TYPE: ICD-10-CM

## 2025-01-21 DIAGNOSIS — R60.0 PERIPHERAL EDEMA: ICD-10-CM

## 2025-01-21 DIAGNOSIS — F19.90 IVDU (INTRAVENOUS DRUG USER): ICD-10-CM

## 2025-01-21 LAB
ALBUMIN SERPL BCP-MCNC: 4.4 G/DL (ref 3.2–4.9)
ALBUMIN/GLOB SERPL: 1.8 G/DL
ALP SERPL-CCNC: 64 U/L (ref 30–99)
ALT SERPL-CCNC: 17 U/L (ref 2–50)
ANION GAP SERPL CALC-SCNC: 12 MMOL/L (ref 7–16)
AST SERPL-CCNC: 29 U/L (ref 12–45)
BASOPHILS # BLD AUTO: 0.6 % (ref 0–1.8)
BASOPHILS # BLD: 0.02 K/UL (ref 0–0.12)
BILIRUB SERPL-MCNC: 0.2 MG/DL (ref 0.1–1.5)
BUN SERPL-MCNC: 11 MG/DL (ref 8–22)
CALCIUM ALBUM COR SERPL-MCNC: 8.6 MG/DL (ref 8.5–10.5)
CALCIUM SERPL-MCNC: 8.9 MG/DL (ref 8.5–10.5)
CHLORIDE SERPL-SCNC: 102 MMOL/L (ref 96–112)
CO2 SERPL-SCNC: 24 MMOL/L (ref 20–33)
CREAT SERPL-MCNC: 0.89 MG/DL (ref 0.5–1.4)
D DIMER PPP IA.FEU-MCNC: <0.27 UG/ML (FEU) (ref 0–0.5)
EKG IMPRESSION: NORMAL
EOSINOPHIL # BLD AUTO: 0.11 K/UL (ref 0–0.51)
EOSINOPHIL NFR BLD: 3 % (ref 0–6.9)
ERYTHROCYTE [DISTWIDTH] IN BLOOD BY AUTOMATED COUNT: 36.1 FL (ref 35.9–50)
GFR SERPLBLD CREATININE-BSD FMLA CKD-EPI: 116 ML/MIN/1.73 M 2
GLOBULIN SER CALC-MCNC: 2.4 G/DL (ref 1.9–3.5)
GLUCOSE SERPL-MCNC: 94 MG/DL (ref 65–99)
HCT VFR BLD AUTO: 39.6 % (ref 42–52)
HGB BLD-MCNC: 13.4 G/DL (ref 14–18)
IMM GRANULOCYTES # BLD AUTO: 0.01 K/UL (ref 0–0.11)
IMM GRANULOCYTES NFR BLD AUTO: 0.3 % (ref 0–0.9)
LYMPHOCYTES # BLD AUTO: 1.24 K/UL (ref 1–4.8)
LYMPHOCYTES NFR BLD: 34.3 % (ref 22–41)
MCH RBC QN AUTO: 28.6 PG (ref 27–33)
MCHC RBC AUTO-ENTMCNC: 33.8 G/DL (ref 32.3–36.5)
MCV RBC AUTO: 84.6 FL (ref 81.4–97.8)
MONOCYTES # BLD AUTO: 0.25 K/UL (ref 0–0.85)
MONOCYTES NFR BLD AUTO: 6.9 % (ref 0–13.4)
NEUTROPHILS # BLD AUTO: 1.99 K/UL (ref 1.82–7.42)
NEUTROPHILS NFR BLD: 54.9 % (ref 44–72)
NRBC # BLD AUTO: 0 K/UL
NRBC BLD-RTO: 0 /100 WBC (ref 0–0.2)
NT-PROBNP SERPL IA-MCNC: 183 PG/ML (ref 0–125)
PLATELET # BLD AUTO: 158 K/UL (ref 164–446)
PMV BLD AUTO: 10.6 FL (ref 9–12.9)
POTASSIUM SERPL-SCNC: 4.2 MMOL/L (ref 3.6–5.5)
PROCALCITONIN SERPL-MCNC: 0.09 NG/ML
PROT SERPL-MCNC: 6.8 G/DL (ref 6–8.2)
RBC # BLD AUTO: 4.68 M/UL (ref 4.7–6.1)
SODIUM SERPL-SCNC: 138 MMOL/L (ref 135–145)
TROPONIN T SERPL-MCNC: 26 NG/L (ref 6–19)
WBC # BLD AUTO: 3.6 K/UL (ref 4.8–10.8)

## 2025-01-21 PROCEDURE — 93970 EXTREMITY STUDY: CPT

## 2025-01-21 PROCEDURE — 80053 COMPREHEN METABOLIC PANEL: CPT

## 2025-01-21 PROCEDURE — 85379 FIBRIN DEGRADATION QUANT: CPT

## 2025-01-21 PROCEDURE — 93005 ELECTROCARDIOGRAM TRACING: CPT | Mod: TC

## 2025-01-21 PROCEDURE — 84484 ASSAY OF TROPONIN QUANT: CPT

## 2025-01-21 PROCEDURE — 83880 ASSAY OF NATRIURETIC PEPTIDE: CPT

## 2025-01-21 PROCEDURE — 85025 COMPLETE CBC W/AUTO DIFF WBC: CPT

## 2025-01-21 PROCEDURE — 71045 X-RAY EXAM CHEST 1 VIEW: CPT

## 2025-01-21 PROCEDURE — 84145 PROCALCITONIN (PCT): CPT

## 2025-01-21 PROCEDURE — 99284 EMERGENCY DEPT VISIT MOD MDM: CPT

## 2025-01-21 PROCEDURE — 36415 COLL VENOUS BLD VENIPUNCTURE: CPT

## 2025-01-21 PROCEDURE — 93005 ELECTROCARDIOGRAM TRACING: CPT | Mod: TC | Performed by: EMERGENCY MEDICINE

## 2025-01-21 NOTE — ED TRIAGE NOTES
Chief Complaint   Patient presents with    Leg Swelling     Pt reports having some leg swelling bilaterally, worse in the R leg than L.     Chest Pain     Comes and goes. Pt noticed it when he elevated his legs initially.      Pt ambulatory to triage for above complaint. Pt reports he has been sober for about 2 years and then relapsed about a month ago. Pt reports last use of fentanyl and meth was last night. Pt states he typically gets edema when he relapses but was concerned with that plus feeling intermittent shortness of breath.

## 2025-01-21 NOTE — ED PROVIDER NOTES
ED Provider Note    CHIEF COMPLAINT  Chief Complaint   Patient presents with    Leg Swelling     Pt reports having some leg swelling bilaterally, worse in the R leg than L.     Chest Pain     Comes and goes. Pt noticed it when he elevated his legs initially.        EXTERNAL RECORDS REVIEWED  Prior history of opioid dependence and meth dependence, prior note from last year the patient had cellulitis    HPI/ROS  LIMITATION TO HISTORY   Select: : None  OUTSIDE HISTORIAN(S):  none    GENESIS MESSINA is a 34 y.o. male who presents to the emergency room for evaluation of some bilateral lower leg swelling and some odd chest sensations and shortness of breath.  Patient states he has been an intermittent methamphetamine and heroin user and over the last several years he had been clean and then relapsed within the last month.  He has been using daily both a mix of inhaled methamphetamine and fentanyl and some occasional IV drug use.  He has noted within the last several days that after he would use he will get some lower extremity swelling and irritation.  Occasionally he will get some chest sensations, some shortness of breath.  This occurred last night that did occur when he was exerting himself and made him concerned.  He then came in for evaluation.  At the time of his assessment he is primarily concerned about his bilateral lower leg swelling, no active chest pain or shortness of breath at this time.    States that he ran out of methamphetamine patient checked himself and to be seen daily at the methadone clinic    PAST MEDICAL HISTORY   As above    SURGICAL HISTORY   has a past surgical history that includes dental extraction(s); tonsillectomy; and irrigation & debridement ortho (Right, 9/12/2020).    FAMILY HISTORY  History reviewed. No pertinent family history.    SOCIAL HISTORY  Social History     Tobacco Use    Smoking status: Former     Current packs/day: 0.50     Types: Cigarettes    Smokeless tobacco:  "Former     Types: Chew     Quit date: 5/9/2018   Vaping Use    Vaping status: Every Day    Substances: Nicotine    Devices: Disposable   Substance and Sexual Activity    Alcohol use: No    Drug use: Not Currently     Types: IV, Methamphetamines, Intravenous     Comment: heroin/meth, fentanyl, benzos    Sexual activity: Not on file     CURRENT MEDICATIONS  Home Medications       Reviewed by Itzel Lo R.N. (Registered Nurse) on 01/21/25 at 1413  Med List Status: Not Addressed     Medication Last Dose Status   cephALEXin (KEFLEX) 500 MG Cap  Active   citalopram (CELEXA) 20 MG Tab  Active   methadone (DOLOPHINE) 10 MG Tab  Active                  ALLERGIES  No Known Allergies    PHYSICAL EXAM  VITAL SIGNS: /89   Pulse 86   Temp 36.4 °C (97.6 °F) (Temporal)   Resp 20   Ht 1.778 m (5' 10\")   Wt 74.9 kg (165 lb 2 oz)   SpO2 96%   BMI 23.69 kg/m²    Genl: M sitting in chair comfortably, speaking clearly, appears in no acute distress   Head: NC/AT   ENT: Mucous membranes moist, posterior pharynx clear, uvula midline, nares patent bilaterally   Eyes: Normal sclera, pupils equal round reactive to light  Neck: Supple, FROM, no LAD appreciated, no jvd  Pulmonary: Lungs are clear to auscultation bilaterally, no crackles or wheezes  Chest: No TTP  CV:  RRR, no murmur appreciated, subtle bilateral lower extremity swelling, little of the knee downward, prior surgical incision on the right lower medial leg is clean dry and intact  Abdomen: soft, NT/ND; no rebound/guarding  Musculoskeletal: Bilateral lower extremity edema.  Pain free ROM of the neck. Moving upper and lower extremities in spontaneous and coordinated fashion  Neuro: A&Ox4 (person, place, time, situation), speech fluent, gait steady, no focal deficits appreciated, No cerebellar signs. Sensation is grossly intact in the distal upper and lower extremities.  5/5 strength in  and dorsiflexion/plantar flexion of the ankles  Psych: Patient has an " appropriate affect and behavior  Skin: No rash or lesions.  No pallor or jaundice.  No cyanosis.  Warm and dry.     EKG/LABS  Results for orders placed or performed during the hospital encounter of 25   EKG   Result Value Ref Range    Report       [facility] Emergency Dept.    Test Date:  2025  Pt Name:    GENESIS MESSINA            Department: ER  MRN:        [id number]                      Room:  Gender:     Male                         Technician: [id number]  :        1990                   Requested By:ER TRIAGE PROTOCOL  Order #:    [id number]                    Reading MD: Alfa Lanier MD    Measurements  Intervals                                Axis  Rate:       75                           P:          62  ME:         130                          QRS:        69  QRSD:       94                           T:          62  QT:         369  QTc:        413    Interpretive Statements  Sinus rhythm, rate of 75, normal intervals and axis, no acute ischemia or  infarction.  Compared to ECG 2021 18:42:10  No significant changes  Electronically Signed On 2025 16:20:30 PST by Alfa Lanier MD       Labs Reviewed   CBC WITH DIFFERENTIAL - Abnormal; Notable for the following components:       Result Value    WBC 3.6 (*)     RBC 4.68 (*)     Hemoglobin 13.4 (*)     Hematocrit 39.6 (*)     Platelet Count 158 (*)     All other components within normal limits   TROPONIN - Abnormal; Notable for the following components:    Troponin T 26 (*)     All other components within normal limits   PROBRAIN NATRIURETIC PEPTIDE, NT - Abnormal; Notable for the following components:    NT-proBNP 183 (*)     All other components within normal limits   COMP METABOLIC PANEL   D-DIMER   PROCALCITONIN   ESTIMATED GFR   TROPONIN     RADIOLOGY/PROCEDURES   I have independently interpreted the diagnostic imaging associated with this visit and am waiting the final reading from the radiologist.   My  preliminary interpretation is as follows: No focal infiltrates, no cardiomegaly.  Ultrasounds did not show any acute deep venous thrombosis or fluid collection    Radiologist interpretation:  US-EXTREMITY VENOUS LOWER BILAT         DX-CHEST-PORTABLE (1 VIEW)   Final Result      1.  No acute cardiac or pulmonary abnormalities are identified.        COURSE & MEDICAL DECISION MAKING    ASSESSMENT, COURSE AND PLAN  Care Narrative:   Patient presents with a chief complaint of chest pain with my initial primary concern primarily around the possibility of a cardiomyopathy secondary to his chronic stimulant use and recent relapse.  He has had some nonspecific chest wall discomfort and pleurisy like pains and discomfort though he has been inhaling hot air from his frequent meth and fentanyl use and continues to endorse using IV drugs after he relapsed though he says it has been sometime since this occurred.  He gets chronic bilateral lower leg edema but does not have active symptoms of claudication nor does he have acute arterial insufficiency or gross venous insufficiency.  His vital signs are reassuring however with his high risk IV drug use and clinical presenting symptomology I did obtain troponin, BNP and both electrolytes and CBC for assessment of possible infection and decompensated heart failure.      EKG did not show any evidence of acute ectopy, no acute ischemia or infarction.  He has a stable but chronic anemia, leukopenia with no gross electrolyte abnormalities or LFT changes.  Ultrasound of the lower extremities did not identify acute clot.  He has a negative D-dimer and with no inappropriate tachycardia or EKG changes I do believe that clot is less likely.  There is no evidence of a new murmur on clinical exam.  Patient's BNP is not grossly elevated and with no cardiac enlargement is possibly not a myositis however his troponin came back measurable at 26.  I have gone back to the bedside and explained the  importance of coming in for echo with his high risk substance abuse and elevated troponin he has not had any echocardiography or further testing and I do believe he would likely benefit from a period of monitoring and allow us to further get him into programs to help him from further relapsing.      I have explained my concerns with his elevated troponin level and while he is at increased risk he is verbalized understanding and would not like to stay.  He is ambulatory, he has no significant tachypnea or hypoxia at this time.  I would recommend that he come in but he is politely declining and will follow-up with a primary care doctor and outpatient cardiology as I have placed the referrals at this time.     Able to verbalize my concerns, he does not want to stay at this time he will be discharged AGAINST MEDICAL ADVICE with outpatient follow-up with PCP and cardiology.   ?  HEART SCORE:2    DISPOSITION AND DISCUSSIONS  I have discussed management of the patient with the following physicians and MAGDIEL's:  none    Discussion of management with other QHP or appropriate source(s): None     Escalation of care considered, and ultimately not performed:after discussion with the patient / family, they have elected to decline an escalation in care    Barriers to care at this time, including but not limited to: Patient does not have established PCP.     Decision tools and prescription drugs considered including, but not limited to:  none .    FINAL DIAGNOSIS  1. Chest pain, unspecified type    2. Peripheral edema    3. IVDU (intravenous drug user)    4. Methamphetamine use (HCC)      Electronically signed by: Yannick Grimm M.D., 1/21/2025 3:21 PM

## 2025-01-22 NOTE — ED NOTES
Discharge instructions provided. Patient to follow with PCP/Cardiology as instructed. Discussed to return to ER if symptoms worsen. Patient verbalized understanding. Pt ambulated to mere   (4) no impairment

## 2025-01-30 ENCOUNTER — TELEPHONE (OUTPATIENT)
Dept: HEALTH INFORMATION MANAGEMENT | Facility: OTHER | Age: 35
End: 2025-01-30
Payer: COMMERCIAL

## 2025-04-13 ENCOUNTER — OFFICE VISIT (OUTPATIENT)
Dept: URGENT CARE | Facility: CLINIC | Age: 35
End: 2025-04-13
Payer: MEDICAID

## 2025-04-13 ENCOUNTER — HOSPITAL ENCOUNTER (OUTPATIENT)
Facility: MEDICAL CENTER | Age: 35
End: 2025-04-13
Payer: MEDICAID

## 2025-04-13 VITALS
BODY MASS INDEX: 23.82 KG/M2 | DIASTOLIC BLOOD PRESSURE: 48 MMHG | RESPIRATION RATE: 18 BRPM | HEIGHT: 70 IN | SYSTOLIC BLOOD PRESSURE: 114 MMHG | TEMPERATURE: 97.1 F | WEIGHT: 166.4 LBS | HEART RATE: 79 BPM | OXYGEN SATURATION: 98 %

## 2025-04-13 DIAGNOSIS — D36.7 DERMOID CYST OF NOSE: ICD-10-CM

## 2025-04-13 DIAGNOSIS — Z20.2 POSSIBLE EXPOSURE TO STD: ICD-10-CM

## 2025-04-13 DIAGNOSIS — R30.0 BURNING WITH URINATION: ICD-10-CM

## 2025-04-13 LAB
APPEARANCE UR: CLEAR
BILIRUB UR STRIP-MCNC: NEGATIVE MG/DL
COLOR UR AUTO: NORMAL
GLUCOSE UR STRIP.AUTO-MCNC: NEGATIVE MG/DL
KETONES UR STRIP.AUTO-MCNC: NEGATIVE MG/DL
LEUKOCYTE ESTERASE UR QL STRIP.AUTO: NEGATIVE
NITRITE UR QL STRIP.AUTO: NEGATIVE
PH UR STRIP.AUTO: 7 [PH] (ref 5–8)
PROT UR QL STRIP: NEGATIVE MG/DL
RBC UR QL AUTO: NEGATIVE
SP GR UR STRIP.AUTO: 1.02
UROBILINOGEN UR STRIP-MCNC: 0.2 MG/DL

## 2025-04-13 PROCEDURE — 81002 URINALYSIS NONAUTO W/O SCOPE: CPT

## 2025-04-13 PROCEDURE — 87491 CHLMYD TRACH DNA AMP PROBE: CPT

## 2025-04-13 PROCEDURE — 99213 OFFICE O/P EST LOW 20 MIN: CPT | Mod: 25

## 2025-04-13 PROCEDURE — 87591 N.GONORRHOEAE DNA AMP PROB: CPT

## 2025-04-13 PROCEDURE — 96372 THER/PROPH/DIAG INJ SC/IM: CPT

## 2025-04-13 RX ORDER — DOXYCYCLINE HYCLATE 100 MG
100 TABLET ORAL 2 TIMES DAILY
Qty: 14 TABLET | Refills: 0 | Status: SHIPPED | OUTPATIENT
Start: 2025-04-13 | End: 2025-04-20

## 2025-04-13 ASSESSMENT — FIBROSIS 4 INDEX: FIB4 SCORE: 2.09

## 2025-04-13 NOTE — LETTER
[facility]  [facility]  [address] [address]     April 13, 2025    Patient: GENESIS MESSINA   YOB: 1990   Date of Visit: 4/13/2025       To Whom It May Concern:    GENESIS MESSINA was seen and treated in our department on 4/13/2025. At this time it is okay to have labs drawn from foot if needed to obtain sample.     Sincerely,     JUNG Menjivar.

## 2025-04-14 LAB
C TRACH DNA SPEC QL NAA+PROBE: NEGATIVE
N GONORRHOEA DNA SPEC QL NAA+PROBE: NEGATIVE
SPECIMEN SOURCE: NORMAL

## 2025-04-15 ASSESSMENT — ENCOUNTER SYMPTOMS
HEADACHES: 0
FEVER: 0
VOMITING: 0
MYALGIAS: 0
FLANK PAIN: 0
ABDOMINAL PAIN: 0
DIARRHEA: 0
SHORTNESS OF BREATH: 0
CHILLS: 0
SORE THROAT: 0
COUGH: 0
NAUSEA: 0

## 2025-04-15 NOTE — PROGRESS NOTES
Subjective:   GENESIS MESSINA is a 34 y.o. male who presents for Sexually Transmitted Diseases (Pt has burning urination, discharge x 1 week ) and Bump (Pt has a bump on nose x 1 year)      Sexually Transmitted Diseases  This is a new problem. The current episode started 1 to 4 weeks ago (Patient reports symptoms started roughly 1 week ago but last sexual encounter was roughly 40 days ago prior to him going into rehab). The problem has been gradually improving. Associated symptoms include urinary symptoms. Pertinent negatives include no abdominal pain, chest pain, chills, congestion, coughing, fever, headaches, myalgias, nausea, rash, sore throat or vomiting. Nothing aggravates the symptoms. He has tried nothing for the symptoms.       Review of Systems   Constitutional:  Negative for chills, fever and malaise/fatigue.   HENT:  Negative for congestion, ear pain, hearing loss and sore throat.    Respiratory:  Negative for cough and shortness of breath.    Cardiovascular:  Negative for chest pain.   Gastrointestinal:  Negative for abdominal pain, diarrhea, nausea and vomiting.   Genitourinary:  Positive for dysuria. Negative for flank pain, frequency, hematuria and urgency.        Penile discharge   Musculoskeletal:  Negative for myalgias.   Skin:  Negative for rash.        Recurrent possible cyst to inner aspect of patient's right nare   Neurological:  Negative for headaches.       No Known Allergies    Patient Active Problem List    Diagnosis Date Noted    Opiate dependence, continuous (Formerly [facility]) 03/27/2021    Opioid abuse (Formerly [facility]) 03/26/2021    Cellulitis of right leg, IVDU to that area, recent S. viridian abscess of R shoulder 09/11/2020    Anxiety 12/04/2018    History of substance abuse (Formerly [facility]) 12/04/2018    Mood disorder (Formerly [facility]) 12/04/2018       Current Outpatient Medications Ordered in Epic   Medication Sig Dispense Refill    doxycycline (VIBRAMYCIN) 100 MG Tab Take 1 Tablet by mouth 2 times a day for 7 days. 14  "Tablet 0    methadone (DOLOPHINE) 10 MG Tab Take 10 mg by mouth.      citalopram (CELEXA) 20 MG Tab Take 20 mg by mouth every day.       No current [facility]-ordered facility-administered medications on file.       Past Surgical History:   Procedure Laterality Date    IRRIGATION & DEBRIDEMENT ORTHO Right 9/12/2020    Procedure: IRRIGATION AND DEBRIDEMENT, WOUND;  Surgeon: Brian Andrea M.D.;  Location: SURGERY [facility];  Service: Orthopedics    DENTAL EXTRACTION(S)      wisdom teeth    TONSILLECTOMY         Social History     Tobacco Use    Smoking status: Every Day     Current packs/day: 0.50     Types: Cigarettes    Smokeless tobacco: Former     Types: Chew     Quit date: 5/9/2018   Vaping Use    Vaping status: Former   Substance Use Topics    Alcohol use: No    Drug use: Not Currently     Types: IV, Methamphetamines, Intravenous     Comment: heroin/meth, fentanyl, benzos       family history is not on file.     Problem list, medications, and allergies reviewed by myself today in Epic.     Objective:   /48 (BP Location: Left arm, Patient Position: Sitting, BP Cuff Size: Adult)   Pulse 79   Temp 36.2 °C (97.1 °F) (Temporal)   Resp 18   Ht 1.778 m (5' 10\")   Wt 75.5 kg (166 lb 6.4 oz)   SpO2 98%   BMI 23.88 kg/m²     Physical Exam  Vitals and nursing note reviewed.   Constitutional:       General: He is not in acute distress.     Appearance: He is not ill-appearing.   Abdominal:      Tenderness: There is no abdominal tenderness. There is no right CVA tenderness, left CVA tenderness or guarding.   Genitourinary:     Pubic Area: No rash.       Penis: Discharge present. No tenderness, swelling or lesions.       Testes:         Right: Tenderness not present.         Left: Tenderness not present.       Results for orders placed or performed in visit on 04/13/25   POCT Urinalysis    Collection Time: 04/13/25  2:00 PM   Result Value Ref Range    POC Color DARK YELLOW Negative    POC Appearance CLEAR Negative "    POC Glucose NEGATIVE Negative mg/dL    POC Bilirubin NEGATIVE Negative mg/dL    POC Ketones NEGATIVE Negative mg/dL    POC Specific Gravity 1.020 <1.005 - >1.030    POC Blood NEGATIVE Negative    POC Urine PH 7.0 5.0 - 8.0    POC Protein NEGATIVE Negative mg/dL    POC Urobiligen 0.2 Negative (0.2) mg/dL    POC Nitrites NEGATIVE Negative    POC Leukocyte Esterase NEGATIVE Negative         Assessment/Plan:     1. Burning with urination  POCT Urinalysis    Chlamydia/GC, PCR (Urine)    HIV AG/AB Combo Assay Screening    T.Pallidum AB LÓPEZ (Screening)    Trichomonas Vaginalis by TMA    Hepatitis C Virus Antibody    HEP B Surface Antibody    Hep B Core AB Total    Hep B Surface Antigen    cefTRIAXone (Rocephin) 1,000 mg in lidocaine (Xylocaine) 1 % 4 mL for IM use    doxycycline (VIBRAMYCIN) 100 MG Tab      2. Dermoid cyst of nose  Referral to Dermatology        After assessment patient here with concerns for possible STD after developing dysuria and penile discharge over the past week.  Patient is currently in rehab but did have a sexual encounter that he does not remember much of the event prior to going into rehab.  Patient does report that he did also use IV drugs prior to going into rehab.  Did perform a UA in office which showed no significant abnormalities.  This time we did do a full STD panel on patient and patient was prophylactically treated with doxycycline and Rocephin.  Patient did also have complaints of a possible cyst to the inner aspect of his nose that he has been on and off for about a year.  Patient was told by previous dermatologist that there is nothing that they can do the patient reports that it does scab and bleed over time and would like a second opinion.  At this time I did provide patient referral to dermatologist.  Patient instructed to monitor for any worsening signs and symptoms and if any other concerns patient was instructed to return to urgent care for reevaluation.    Differential  diagnosis, natural history, and supportive care discussed. We also reviewed side effects of medication including allergic response, GI upset, tendon injury, rash, sedation etc. Patient and/or guardian voices understanding.      Advised the patient to follow-up with the primary care physician for recheck, reevaluation, and consideration of further management.    Please note that this dictation was created using voice recognition software. I have made every reasonable attempt to correct obvious errors, but I expect that there are errors of grammar and possibly content that I did not discover before finalizing the note.    This note was electronically signed by CARROLL Martinez

## 2025-04-17 NOTE — Clinical Note
REFERRAL APPROVAL NOTICE         Sent on April 17, 2025                   Luther MESSINA  [address]  [place] [address]                   Dear Mr. MESSINA,    After a careful review of the medical information and benefit coverage, [facility] has processed your referral. See below for additional details.    If applicable, you must be actively enrolled with your insurance for coverage of the authorized service. If you have any questions regarding your coverage, please contact your insurance directly.    REFERRAL INFORMATION   Referral #:  [id number]  Referred-To Provider    Referred-By Provider:  Dermatology    CARROLL Menjivar   [facility]      [address]  [address]  [place] [address]  [phone] [address]  [facility] [facility] [address]  [phone]    Referral Start Date:  04/13/2025  Referral End Date:   04/13/2026             SCHEDULING  If you do not already have an appointment, please call [phone] to make an appointment.     MORE INFORMATION  If you do not already have a [email or web address] account, sign up at: [email or web address]  You can access your medical information, make appointments, see lab results, billing information, and more.  If you have questions regarding this referral, please contact  the [facility] Referrals department at:             [phone]. Monday - Friday 8:00AM - 5:00PM.     Sincerely,    [facility]

## 2025-04-18 ENCOUNTER — RESULTS FOLLOW-UP (OUTPATIENT)
Dept: URGENT CARE | Facility: PHYSICIAN GROUP | Age: 35
End: 2025-04-18

## 2025-05-06 ENCOUNTER — NON-PROVIDER VISIT (OUTPATIENT)
Dept: URGENT CARE | Facility: CLINIC | Age: 35
End: 2025-05-06

## 2025-05-06 DIAGNOSIS — Z02.1 PRE-EMPLOYMENT DRUG SCREENING: ICD-10-CM

## 2025-05-06 LAB
AMP AMPHETAMINE: NORMAL
COC COCAINE: NORMAL
INT CON NEG: NEGATIVE
INT CON POS: POSITIVE
MET METHAMPHETAMINES: NORMAL
OPI OPIATES: NORMAL
PCP PHENCYCLIDINE: NORMAL
POC DRUG COMMENT 753798-OCCUPATIONAL HEALTH: NEGATIVE
THC: NORMAL

## 2025-05-06 PROCEDURE — 8895 PB URINE 6 PANEL AFTER HOURS: Performed by: STUDENT IN AN ORGANIZED HEALTH CARE EDUCATION/TRAINING PROGRAM

## 2025-05-06 PROCEDURE — 80305 DRUG TEST PRSMV DIR OPT OBS: CPT | Performed by: STUDENT IN AN ORGANIZED HEALTH CARE EDUCATION/TRAINING PROGRAM

## 2025-06-13 ENCOUNTER — HOSPITAL ENCOUNTER (INPATIENT)
Facility: MEDICAL CENTER | Age: 35
LOS: 4 days | DRG: 193 | End: 2025-06-17
Attending: EMERGENCY MEDICINE | Admitting: HOSPITALIST
Payer: MEDICAID

## 2025-06-13 ENCOUNTER — APPOINTMENT (OUTPATIENT)
Dept: RADIOLOGY | Facility: MEDICAL CENTER | Age: 35
DRG: 193 | End: 2025-06-13
Attending: EMERGENCY MEDICINE
Payer: MEDICAID

## 2025-06-13 DIAGNOSIS — A41.9 SEPSIS, DUE TO UNSPECIFIED ORGANISM, UNSPECIFIED WHETHER ACUTE ORGAN DYSFUNCTION PRESENT (HCC): ICD-10-CM

## 2025-06-13 DIAGNOSIS — D64.9 ANEMIA, UNSPECIFIED TYPE: ICD-10-CM

## 2025-06-13 DIAGNOSIS — R09.02 HYPOXIA: ICD-10-CM

## 2025-06-13 DIAGNOSIS — J18.9 MULTIFOCAL PNEUMONIA: Primary | ICD-10-CM

## 2025-06-13 PROBLEM — F19.10 POLYSUBSTANCE ABUSE (HCC): Status: ACTIVE | Noted: 2018-12-04

## 2025-06-13 PROBLEM — F17.200 NICOTINE USE DISORDER: Status: ACTIVE | Noted: 2025-06-13

## 2025-06-13 PROBLEM — J96.01 ACUTE RESPIRATORY FAILURE WITH HYPOXIA (HCC): Status: ACTIVE | Noted: 2025-06-13

## 2025-06-13 LAB
ALBUMIN SERPL BCP-MCNC: 3.6 G/DL (ref 3.2–4.9)
ALBUMIN/GLOB SERPL: 1.1 G/DL
ALP SERPL-CCNC: 75 U/L (ref 30–99)
ALT SERPL-CCNC: 44 U/L (ref 2–50)
ANION GAP SERPL CALC-SCNC: 15 MMOL/L (ref 7–16)
APPEARANCE UR: CLEAR
AST SERPL-CCNC: 63 U/L (ref 12–45)
BASOPHILS # BLD AUTO: 0.1 % (ref 0–1.8)
BASOPHILS # BLD: 0.01 K/UL (ref 0–0.12)
BILIRUB SERPL-MCNC: 0.5 MG/DL (ref 0.1–1.5)
BILIRUB UR QL STRIP.AUTO: NEGATIVE
BUN SERPL-MCNC: 14 MG/DL (ref 8–22)
CALCIUM ALBUM COR SERPL-MCNC: 8.9 MG/DL (ref 8.5–10.5)
CALCIUM SERPL-MCNC: 8.6 MG/DL (ref 8.5–10.5)
CHLORIDE SERPL-SCNC: 99 MMOL/L (ref 96–112)
CO2 SERPL-SCNC: 25 MMOL/L (ref 20–33)
COLOR UR: YELLOW
CREAT SERPL-MCNC: 0.92 MG/DL (ref 0.5–1.4)
EKG IMPRESSION: NORMAL
EOSINOPHIL # BLD AUTO: 0 K/UL (ref 0–0.51)
EOSINOPHIL NFR BLD: 0 % (ref 0–6.9)
ERYTHROCYTE [DISTWIDTH] IN BLOOD BY AUTOMATED COUNT: 39.1 FL (ref 35.9–50)
FLUAV RNA SPEC QL NAA+PROBE: NEGATIVE
FLUBV RNA SPEC QL NAA+PROBE: NEGATIVE
GFR SERPLBLD CREATININE-BSD FMLA CKD-EPI: 112 ML/MIN/1.73 M 2
GLOBULIN SER CALC-MCNC: 3.4 G/DL (ref 1.9–3.5)
GLUCOSE SERPL-MCNC: 108 MG/DL (ref 65–99)
GLUCOSE UR STRIP.AUTO-MCNC: NEGATIVE MG/DL
HCT VFR BLD AUTO: 31.4 % (ref 42–52)
HGB BLD-MCNC: 12.1 G/DL (ref 14–18)
IMM GRANULOCYTES # BLD AUTO: 0.07 K/UL (ref 0–0.11)
IMM GRANULOCYTES NFR BLD AUTO: 0.8 % (ref 0–0.9)
IRON SATN MFR SERPL: 7 % (ref 15–55)
IRON SERPL-MCNC: 13 UG/DL (ref 50–180)
KETONES UR STRIP.AUTO-MCNC: 15 MG/DL
LACTATE SERPL-SCNC: 1.2 MMOL/L (ref 0.5–2)
LEUKOCYTE ESTERASE UR QL STRIP.AUTO: NEGATIVE
LYMPHOCYTES # BLD AUTO: 0.61 K/UL (ref 1–4.8)
LYMPHOCYTES NFR BLD: 7.2 % (ref 22–41)
MCH RBC QN AUTO: 28.5 PG (ref 27–33)
MCHC RBC AUTO-ENTMCNC: 33.4 G/DL (ref 32.3–36.5)
MCV RBC AUTO: 91.3 FL (ref 81.4–97.8)
MICRO URNS: ABNORMAL
MONOCYTES # BLD AUTO: 0.58 K/UL (ref 0–0.85)
MONOCYTES NFR BLD AUTO: 6.9 % (ref 0–13.4)
NEUTROPHILS # BLD AUTO: 7.17 K/UL (ref 1.82–7.42)
NEUTROPHILS NFR BLD: 85 % (ref 44–72)
NITRITE UR QL STRIP.AUTO: NEGATIVE
NRBC # BLD AUTO: 0 K/UL
NRBC BLD-RTO: 0 /100 WBC (ref 0–0.2)
PH UR STRIP.AUTO: 6.5 [PH] (ref 5–8)
PLATELET # BLD AUTO: 273 K/UL (ref 164–446)
PMV BLD AUTO: 10.8 FL (ref 9–12.9)
POTASSIUM SERPL-SCNC: 4.7 MMOL/L (ref 3.6–5.5)
PROT SERPL-MCNC: 7 G/DL (ref 6–8.2)
PROT UR QL STRIP: NEGATIVE MG/DL
RBC # BLD AUTO: 3.44 M/UL (ref 4.7–6.1)
RBC UR QL AUTO: NEGATIVE
RSV RNA SPEC QL NAA+PROBE: NEGATIVE
SARS-COV-2 RNA RESP QL NAA+PROBE: NOTDETECTED
SODIUM SERPL-SCNC: 139 MMOL/L (ref 135–145)
SP GR UR STRIP.AUTO: 1.01
SPECIMEN SOURCE: NORMAL
TIBC SERPL-MCNC: 190 UG/DL (ref 250–450)
UIBC SERPL-MCNC: 177 UG/DL (ref 110–370)
UROBILINOGEN UR STRIP.AUTO-MCNC: 1 EU/DL
WBC # BLD AUTO: 8.4 K/UL (ref 4.8–10.8)

## 2025-06-13 PROCEDURE — 87040 BLOOD CULTURE FOR BACTERIA: CPT | Mod: 91

## 2025-06-13 PROCEDURE — 700105 HCHG RX REV CODE 258: Performed by: HOSPITALIST

## 2025-06-13 PROCEDURE — 71045 X-RAY EXAM CHEST 1 VIEW: CPT

## 2025-06-13 PROCEDURE — 36415 COLL VENOUS BLD VENIPUNCTURE: CPT

## 2025-06-13 PROCEDURE — 82607 VITAMIN B-12: CPT

## 2025-06-13 PROCEDURE — 700105 HCHG RX REV CODE 258: Performed by: EMERGENCY MEDICINE

## 2025-06-13 PROCEDURE — 94760 N-INVAS EAR/PLS OXIMETRY 1: CPT

## 2025-06-13 PROCEDURE — 99285 EMERGENCY DEPT VISIT HI MDM: CPT

## 2025-06-13 PROCEDURE — 87449 NOS EACH ORGANISM AG IA: CPT

## 2025-06-13 PROCEDURE — 700111 HCHG RX REV CODE 636 W/ 250 OVERRIDE (IP): Mod: JZ | Performed by: HOSPITALIST

## 2025-06-13 PROCEDURE — 700111 HCHG RX REV CODE 636 W/ 250 OVERRIDE (IP): Mod: JZ | Performed by: EMERGENCY MEDICINE

## 2025-06-13 PROCEDURE — 96361 HYDRATE IV INFUSION ADD-ON: CPT

## 2025-06-13 PROCEDURE — 700102 HCHG RX REV CODE 250 W/ 637 OVERRIDE(OP): Performed by: EMERGENCY MEDICINE

## 2025-06-13 PROCEDURE — 93005 ELECTROCARDIOGRAM TRACING: CPT | Mod: TC | Performed by: EMERGENCY MEDICINE

## 2025-06-13 PROCEDURE — 99223 1ST HOSP IP/OBS HIGH 75: CPT | Mod: 25 | Performed by: HOSPITALIST

## 2025-06-13 PROCEDURE — 96365 THER/PROPH/DIAG IV INF INIT: CPT

## 2025-06-13 PROCEDURE — 82728 ASSAY OF FERRITIN: CPT

## 2025-06-13 PROCEDURE — 87086 URINE CULTURE/COLONY COUNT: CPT

## 2025-06-13 PROCEDURE — 0241U HCHG SARS-COV-2 COVID-19 NFCT DS RESP RNA 4 TRGT MIC: CPT

## 2025-06-13 PROCEDURE — 83550 IRON BINDING TEST: CPT

## 2025-06-13 PROCEDURE — 83540 ASSAY OF IRON: CPT

## 2025-06-13 PROCEDURE — 0202U NFCT DS 22 TRGT SARS-COV-2: CPT

## 2025-06-13 PROCEDURE — 81003 URINALYSIS AUTO W/O SCOPE: CPT

## 2025-06-13 PROCEDURE — 83605 ASSAY OF LACTIC ACID: CPT

## 2025-06-13 PROCEDURE — 80307 DRUG TEST PRSMV CHEM ANLYZR: CPT

## 2025-06-13 PROCEDURE — 770020 HCHG ROOM/CARE - TELE (206)

## 2025-06-13 PROCEDURE — 99407 BEHAV CHNG SMOKING > 10 MIN: CPT | Performed by: HOSPITALIST

## 2025-06-13 PROCEDURE — 87899 AGENT NOS ASSAY W/OPTIC: CPT

## 2025-06-13 PROCEDURE — A9270 NON-COVERED ITEM OR SERVICE: HCPCS | Performed by: EMERGENCY MEDICINE

## 2025-06-13 PROCEDURE — 85025 COMPLETE CBC W/AUTO DIFF WBC: CPT

## 2025-06-13 PROCEDURE — 80053 COMPREHEN METABOLIC PANEL: CPT

## 2025-06-13 RX ORDER — PROCHLORPERAZINE EDISYLATE 5 MG/ML
5-10 INJECTION INTRAMUSCULAR; INTRAVENOUS EVERY 4 HOURS PRN
Status: DISCONTINUED | OUTPATIENT
Start: 2025-06-13 | End: 2025-06-17 | Stop reason: HOSPADM

## 2025-06-13 RX ORDER — METHADONE HYDROCHLORIDE 10 MG/1
15 TABLET ORAL DAILY
Refills: 0 | Status: DISCONTINUED | OUTPATIENT
Start: 2025-06-14 | End: 2025-06-14

## 2025-06-13 RX ORDER — ONDANSETRON 2 MG/ML
4 INJECTION INTRAMUSCULAR; INTRAVENOUS EVERY 4 HOURS PRN
Status: DISCONTINUED | OUTPATIENT
Start: 2025-06-13 | End: 2025-06-17 | Stop reason: HOSPADM

## 2025-06-13 RX ORDER — POLYETHYLENE GLYCOL 3350 17 G/17G
1 POWDER, FOR SOLUTION ORAL
Status: DISCONTINUED | OUTPATIENT
Start: 2025-06-13 | End: 2025-06-17 | Stop reason: HOSPADM

## 2025-06-13 RX ORDER — ONDANSETRON 4 MG/1
4 TABLET, ORALLY DISINTEGRATING ORAL EVERY 4 HOURS PRN
Status: DISCONTINUED | OUTPATIENT
Start: 2025-06-13 | End: 2025-06-17 | Stop reason: HOSPADM

## 2025-06-13 RX ORDER — AZITHROMYCIN 250 MG/1
500 TABLET, FILM COATED ORAL DAILY
Status: DISCONTINUED | OUTPATIENT
Start: 2025-06-14 | End: 2025-06-15

## 2025-06-13 RX ORDER — PROMETHAZINE HYDROCHLORIDE 25 MG/1
12.5-25 SUPPOSITORY RECTAL EVERY 4 HOURS PRN
Status: DISCONTINUED | OUTPATIENT
Start: 2025-06-13 | End: 2025-06-17 | Stop reason: HOSPADM

## 2025-06-13 RX ORDER — ENOXAPARIN SODIUM 100 MG/ML
40 INJECTION SUBCUTANEOUS DAILY
Status: DISCONTINUED | OUTPATIENT
Start: 2025-06-13 | End: 2025-06-17 | Stop reason: HOSPADM

## 2025-06-13 RX ORDER — PROMETHAZINE HYDROCHLORIDE 25 MG/1
12.5-25 TABLET ORAL EVERY 4 HOURS PRN
Status: DISCONTINUED | OUTPATIENT
Start: 2025-06-13 | End: 2025-06-17 | Stop reason: HOSPADM

## 2025-06-13 RX ORDER — METHADONE HYDROCHLORIDE 10 MG/ML
75 CONCENTRATE ORAL DAILY
COMMUNITY

## 2025-06-13 RX ORDER — AZITHROMYCIN 250 MG/1
500 TABLET, FILM COATED ORAL ONCE
Status: COMPLETED | OUTPATIENT
Start: 2025-06-13 | End: 2025-06-13

## 2025-06-13 RX ORDER — LABETALOL HYDROCHLORIDE 5 MG/ML
10 INJECTION, SOLUTION INTRAVENOUS EVERY 4 HOURS PRN
Status: DISCONTINUED | OUTPATIENT
Start: 2025-06-13 | End: 2025-06-17 | Stop reason: HOSPADM

## 2025-06-13 RX ORDER — CITALOPRAM HYDROBROMIDE 20 MG/1
40 TABLET ORAL DAILY
Status: DISCONTINUED | OUTPATIENT
Start: 2025-06-14 | End: 2025-06-17 | Stop reason: HOSPADM

## 2025-06-13 RX ORDER — METHADONE HYDROCHLORIDE 40 MG/1
40 TABLET ORAL DAILY
Refills: 0 | Status: DISCONTINUED | OUTPATIENT
Start: 2025-06-14 | End: 2025-06-14

## 2025-06-13 RX ORDER — SODIUM CHLORIDE, SODIUM LACTATE, POTASSIUM CHLORIDE, AND CALCIUM CHLORIDE .6; .31; .03; .02 G/100ML; G/100ML; G/100ML; G/100ML
30 INJECTION, SOLUTION INTRAVENOUS ONCE
Status: COMPLETED | OUTPATIENT
Start: 2025-06-13 | End: 2025-06-13

## 2025-06-13 RX ORDER — ACETAMINOPHEN 325 MG/1
650 TABLET ORAL ONCE
Status: COMPLETED | OUTPATIENT
Start: 2025-06-13 | End: 2025-06-13

## 2025-06-13 RX ORDER — NICOTINE 21 MG/24HR
21 PATCH, TRANSDERMAL 24 HOURS TRANSDERMAL
Status: DISCONTINUED | OUTPATIENT
Start: 2025-06-14 | End: 2025-06-17 | Stop reason: HOSPADM

## 2025-06-13 RX ORDER — ACETAMINOPHEN 325 MG/1
650 TABLET ORAL EVERY 6 HOURS PRN
Status: DISCONTINUED | OUTPATIENT
Start: 2025-06-13 | End: 2025-06-17 | Stop reason: HOSPADM

## 2025-06-13 RX ORDER — SODIUM CHLORIDE 9 MG/ML
INJECTION, SOLUTION INTRAVENOUS CONTINUOUS
Status: ACTIVE | OUTPATIENT
Start: 2025-06-13 | End: 2025-06-14

## 2025-06-13 RX ORDER — METHADONE HYDROCHLORIDE 10 MG/1
10 TABLET ORAL DAILY
Refills: 0 | Status: DISCONTINUED | OUTPATIENT
Start: 2025-06-14 | End: 2025-06-13

## 2025-06-13 RX ORDER — AMOXICILLIN 250 MG
2 CAPSULE ORAL EVERY EVENING
Status: DISCONTINUED | OUTPATIENT
Start: 2025-06-13 | End: 2025-06-17 | Stop reason: HOSPADM

## 2025-06-13 RX ADMIN — SODIUM CHLORIDE: 9 INJECTION, SOLUTION INTRAVENOUS at 21:57

## 2025-06-13 RX ADMIN — AZITHROMYCIN DIHYDRATE 500 MG: 250 TABLET ORAL at 19:20

## 2025-06-13 RX ADMIN — ACETAMINOPHEN 650 MG: 325 TABLET ORAL at 18:59

## 2025-06-13 RX ADMIN — AMPICILLIN AND SULBACTAM 3 G: 1; 2 INJECTION, POWDER, FOR SOLUTION INTRAMUSCULAR; INTRAVENOUS at 19:20

## 2025-06-13 RX ADMIN — ENOXAPARIN SODIUM 40 MG: 100 INJECTION SUBCUTANEOUS at 22:27

## 2025-06-13 RX ADMIN — SODIUM CHLORIDE, POTASSIUM CHLORIDE, SODIUM LACTATE AND CALCIUM CHLORIDE 2199 ML: 600; 310; 30; 20 INJECTION, SOLUTION INTRAVENOUS at 19:00

## 2025-06-13 SDOH — ECONOMIC STABILITY: TRANSPORTATION INSECURITY
IN THE PAST 12 MONTHS, HAS LACK OF RELIABLE TRANSPORTATION KEPT YOU FROM MEDICAL APPOINTMENTS, MEETINGS, WORK OR FROM GETTING THINGS NEEDED FOR DAILY LIVING?: NO

## 2025-06-13 SDOH — ECONOMIC STABILITY: TRANSPORTATION INSECURITY
IN THE PAST 12 MONTHS, HAS THE LACK OF TRANSPORTATION KEPT YOU FROM MEDICAL APPOINTMENTS OR FROM GETTING MEDICATIONS?: NO

## 2025-06-13 ASSESSMENT — COGNITIVE AND FUNCTIONAL STATUS - GENERAL
MOBILITY SCORE: 21
TOILETING: A LITTLE
HELP NEEDED FOR BATHING: A LITTLE
DAILY ACTIVITIY SCORE: 22
SUGGESTED CMS G CODE MODIFIER DAILY ACTIVITY: CJ
STANDING UP FROM CHAIR USING ARMS: A LITTLE
SUGGESTED CMS G CODE MODIFIER MOBILITY: CJ
WALKING IN HOSPITAL ROOM: A LITTLE
CLIMB 3 TO 5 STEPS WITH RAILING: A LITTLE

## 2025-06-13 ASSESSMENT — LIFESTYLE VARIABLES
HAVE PEOPLE ANNOYED YOU BY CRITICIZING YOUR DRINKING: NO
EVER HAD A DRINK FIRST THING IN THE MORNING TO STEADY YOUR NERVES TO GET RID OF A HANGOVER: NO
TOTAL SCORE: 0
HOW MANY TIMES IN THE PAST YEAR HAVE YOU HAD 5 OR MORE DRINKS IN A DAY: 0
DOES PATIENT WANT TO STOP DRINKING: NO
HAVE YOU EVER FELT YOU SHOULD CUT DOWN ON YOUR DRINKING: NO
ON A TYPICAL DAY WHEN YOU DRINK ALCOHOL HOW MANY DRINKS DO YOU HAVE: 0
AVERAGE NUMBER OF DAYS PER WEEK YOU HAVE A DRINK CONTAINING ALCOHOL: 0
EVER FELT BAD OR GUILTY ABOUT YOUR DRINKING: NO
ALCOHOL_USE: NO
CONSUMPTION TOTAL: NEGATIVE
TOTAL SCORE: 0
SUBSTANCE_ABUSE: 0
TOTAL SCORE: 0

## 2025-06-13 ASSESSMENT — ENCOUNTER SYMPTOMS
EYE DISCHARGE: 0
ORTHOPNEA: 0
COUGH: 1
SENSORY CHANGE: 0
DIAPHORESIS: 0
SINUS PAIN: 0
SEIZURES: 0
PSYCHIATRIC NEGATIVE: 1
SORE THROAT: 1
PND: 0
FEVER: 0
SPUTUM PRODUCTION: 0
CARDIOVASCULAR NEGATIVE: 1
GASTROINTESTINAL NEGATIVE: 1
DOUBLE VISION: 0
SHORTNESS OF BREATH: 1
CHILLS: 1
EYE REDNESS: 0
FOCAL WEAKNESS: 0
DIZZINESS: 1
STRIDOR: 0
POLYDIPSIA: 0
ABDOMINAL PAIN: 0
TINGLING: 0
MYALGIAS: 0
BLOOD IN STOOL: 0
MUSCULOSKELETAL NEGATIVE: 1
INSOMNIA: 0
EYES NEGATIVE: 1
WHEEZING: 0
BRUISES/BLEEDS EASILY: 0
DEPRESSION: 0
BACK PAIN: 0
FLANK PAIN: 0
FALLS: 0
PHOTOPHOBIA: 0
HEARTBURN: 0

## 2025-06-13 ASSESSMENT — COPD QUESTIONNAIRES
DO YOU EVER COUGH UP ANY MUCUS OR PHLEGM?: NO/ONLY WITH OCCASIONAL COLDS OR INFECTIONS
HAVE YOU SMOKED AT LEAST 100 CIGARETTES IN YOUR ENTIRE LIFE: YES
DURING THE PAST 4 WEEKS HOW MUCH DID YOU FEEL SHORT OF BREATH: SOME OF THE TIME
COPD SCREENING SCORE: 3

## 2025-06-13 ASSESSMENT — SOCIAL DETERMINANTS OF HEALTH (SDOH)
IN THE PAST 12 MONTHS, HAS THE ELECTRIC, GAS, OIL, OR WATER COMPANY THREATENED TO SHUT OFF SERVICE IN YOUR HOME?: NO
WITHIN THE PAST 12 MONTHS, YOU WORRIED THAT YOUR FOOD WOULD RUN OUT BEFORE YOU GOT THE MONEY TO BUY MORE: NEVER TRUE
WITHIN THE LAST YEAR, HAVE YOU BEEN KICKED, HIT, SLAPPED, OR OTHERWISE PHYSICALLY HURT BY YOUR PARTNER OR EX-PARTNER?: NO
WITHIN THE LAST YEAR, HAVE YOU BEEN AFRAID OF YOUR PARTNER OR EX-PARTNER?: NO
WITHIN THE LAST YEAR, HAVE TO BEEN RAPED OR FORCED TO HAVE ANY KIND OF SEXUAL ACTIVITY BY YOUR PARTNER OR EX-PARTNER?: NO
WITHIN THE LAST YEAR, HAVE YOU BEEN HUMILIATED OR EMOTIONALLY ABUSED IN OTHER WAYS BY YOUR PARTNER OR EX-PARTNER?: NO
WITHIN THE PAST 12 MONTHS, THE FOOD YOU BOUGHT JUST DIDN'T LAST AND YOU DIDN'T HAVE MONEY TO GET MORE: NEVER TRUE

## 2025-06-13 ASSESSMENT — FIBROSIS 4 INDEX
FIB4 SCORE: 1.15
FIB4 SCORE: 2.09

## 2025-06-13 ASSESSMENT — PATIENT HEALTH QUESTIONNAIRE - PHQ9
1. LITTLE INTEREST OR PLEASURE IN DOING THINGS: NOT AT ALL
SUM OF ALL RESPONSES TO PHQ9 QUESTIONS 1 AND 2: 0
2. FEELING DOWN, DEPRESSED, IRRITABLE, OR HOPELESS: NOT AT ALL

## 2025-06-14 ENCOUNTER — APPOINTMENT (OUTPATIENT)
Dept: CARDIOLOGY | Facility: MEDICAL CENTER | Age: 35
DRG: 193 | End: 2025-06-14
Attending: HOSPITALIST
Payer: MEDICAID

## 2025-06-14 LAB
AMPHET UR QL SCN: POSITIVE
ANION GAP SERPL CALC-SCNC: 10 MMOL/L (ref 7–16)
B PARAP IS1001 DNA NPH QL NAA+NON-PROBE: NOT DETECTED
B PERT.PT PRMT NPH QL NAA+NON-PROBE: NOT DETECTED
BARBITURATES UR QL SCN: NEGATIVE
BENZODIAZ UR QL SCN: NEGATIVE
BUN SERPL-MCNC: 13 MG/DL (ref 8–22)
BZE UR QL SCN: NEGATIVE
C PNEUM DNA NPH QL NAA+NON-PROBE: NOT DETECTED
CALCIUM SERPL-MCNC: 7.9 MG/DL (ref 8.5–10.5)
CANNABINOIDS UR QL SCN: NEGATIVE
CHLORIDE SERPL-SCNC: 104 MMOL/L (ref 96–112)
CO2 SERPL-SCNC: 25 MMOL/L (ref 20–33)
CREAT SERPL-MCNC: 0.74 MG/DL (ref 0.5–1.4)
ERYTHROCYTE [DISTWIDTH] IN BLOOD BY AUTOMATED COUNT: 38.7 FL (ref 35.9–50)
FENTANYL UR QL: POSITIVE
FERRITIN SERPL-MCNC: 392 NG/ML (ref 22–322)
FLUAV RNA NPH QL NAA+NON-PROBE: NOT DETECTED
FLUBV RNA NPH QL NAA+NON-PROBE: NOT DETECTED
GFR SERPLBLD CREATININE-BSD FMLA CKD-EPI: 122 ML/MIN/1.73 M 2
GLUCOSE SERPL-MCNC: 125 MG/DL (ref 65–99)
HADV DNA NPH QL NAA+NON-PROBE: NOT DETECTED
HCOV 229E RNA NPH QL NAA+NON-PROBE: NOT DETECTED
HCOV HKU1 RNA NPH QL NAA+NON-PROBE: NOT DETECTED
HCOV NL63 RNA NPH QL NAA+NON-PROBE: NOT DETECTED
HCOV OC43 RNA NPH QL NAA+NON-PROBE: NOT DETECTED
HCT VFR BLD AUTO: 35.2 % (ref 42–52)
HGB BLD-MCNC: 11.5 G/DL (ref 14–18)
HGB RETIC QN AUTO: 24.3 PG/CELL (ref 29–35)
HMPV RNA NPH QL NAA+NON-PROBE: NOT DETECTED
HPIV1 RNA NPH QL NAA+NON-PROBE: NOT DETECTED
HPIV2 RNA NPH QL NAA+NON-PROBE: NOT DETECTED
HPIV3 RNA NPH QL NAA+NON-PROBE: NOT DETECTED
HPIV4 RNA NPH QL NAA+NON-PROBE: NOT DETECTED
IMM RETICS NFR: 13.2 % (ref 2.6–16.1)
LACTATE SERPL-SCNC: 1 MMOL/L (ref 0.5–2)
LV EJECT FRACT  99904: 65
LV EJECT FRACT MOD 2C 99903: 64.18
LV EJECT FRACT MOD 4C 99902: 63.17
LV EJECT FRACT MOD BP 99901: 63.74
M PNEUMO DNA NPH QL NAA+NON-PROBE: DETECTED
MAGNESIUM SERPL-MCNC: 2.2 MG/DL (ref 1.5–2.5)
MCH RBC QN AUTO: 28.2 PG (ref 27–33)
MCHC RBC AUTO-ENTMCNC: 32.7 G/DL (ref 32.3–36.5)
MCV RBC AUTO: 86.3 FL (ref 81.4–97.8)
METHADONE UR QL SCN: POSITIVE
OPIATES UR QL SCN: NEGATIVE
OXYCODONE UR QL SCN: NEGATIVE
PCP UR QL SCN: NEGATIVE
PLATELET # BLD AUTO: 232 K/UL (ref 164–446)
PMV BLD AUTO: 10.5 FL (ref 9–12.9)
POTASSIUM SERPL-SCNC: 4.2 MMOL/L (ref 3.6–5.5)
PROCALCITONIN SERPL-MCNC: 0.26 NG/ML
PROPOXYPH UR QL SCN: NEGATIVE
RBC # BLD AUTO: 4.08 M/UL (ref 4.7–6.1)
RETICS # AUTO: 0.03 M/UL (ref 0.04–0.12)
RETICS/RBC NFR: 0.6 % (ref 0.8–2.6)
RSV RNA NPH QL NAA+NON-PROBE: NOT DETECTED
RV+EV RNA NPH QL NAA+NON-PROBE: NOT DETECTED
SARS-COV-2 RNA NPH QL NAA+NON-PROBE: NOTDETECTED
SODIUM SERPL-SCNC: 139 MMOL/L (ref 135–145)
TROPONIN T SERPL-MCNC: 20 NG/L (ref 6–19)
VIT B12 SERPL-MCNC: 991 PG/ML (ref 211–911)
WBC # BLD AUTO: 5.7 K/UL (ref 4.8–10.8)

## 2025-06-14 PROCEDURE — 700102 HCHG RX REV CODE 250 W/ 637 OVERRIDE(OP): Performed by: HOSPITALIST

## 2025-06-14 PROCEDURE — 85027 COMPLETE CBC AUTOMATED: CPT

## 2025-06-14 PROCEDURE — 36415 COLL VENOUS BLD VENIPUNCTURE: CPT

## 2025-06-14 PROCEDURE — 770020 HCHG ROOM/CARE - TELE (206)

## 2025-06-14 PROCEDURE — A9270 NON-COVERED ITEM OR SERVICE: HCPCS | Performed by: HOSPITALIST

## 2025-06-14 PROCEDURE — 700102 HCHG RX REV CODE 250 W/ 637 OVERRIDE(OP): Performed by: INTERNAL MEDICINE

## 2025-06-14 PROCEDURE — A9270 NON-COVERED ITEM OR SERVICE: HCPCS | Performed by: INTERNAL MEDICINE

## 2025-06-14 PROCEDURE — 84484 ASSAY OF TROPONIN QUANT: CPT

## 2025-06-14 PROCEDURE — 700102 HCHG RX REV CODE 250 W/ 637 OVERRIDE(OP): Performed by: STUDENT IN AN ORGANIZED HEALTH CARE EDUCATION/TRAINING PROGRAM

## 2025-06-14 PROCEDURE — 85046 RETICYTE/HGB CONCENTRATE: CPT

## 2025-06-14 PROCEDURE — 700111 HCHG RX REV CODE 636 W/ 250 OVERRIDE (IP): Mod: JZ | Performed by: HOSPITALIST

## 2025-06-14 PROCEDURE — 93306 TTE W/DOPPLER COMPLETE: CPT | Mod: 26 | Performed by: INTERNAL MEDICINE

## 2025-06-14 PROCEDURE — 94760 N-INVAS EAR/PLS OXIMETRY 1: CPT

## 2025-06-14 PROCEDURE — 87070 CULTURE OTHR SPECIMN AEROBIC: CPT

## 2025-06-14 PROCEDURE — 84145 PROCALCITONIN (PCT): CPT

## 2025-06-14 PROCEDURE — 83605 ASSAY OF LACTIC ACID: CPT

## 2025-06-14 PROCEDURE — 700105 HCHG RX REV CODE 258: Performed by: HOSPITALIST

## 2025-06-14 PROCEDURE — A9270 NON-COVERED ITEM OR SERVICE: HCPCS | Performed by: STUDENT IN AN ORGANIZED HEALTH CARE EDUCATION/TRAINING PROGRAM

## 2025-06-14 PROCEDURE — 83735 ASSAY OF MAGNESIUM: CPT

## 2025-06-14 PROCEDURE — 99233 SBSQ HOSP IP/OBS HIGH 50: CPT | Performed by: INTERNAL MEDICINE

## 2025-06-14 PROCEDURE — 93306 TTE W/DOPPLER COMPLETE: CPT

## 2025-06-14 PROCEDURE — 87205 SMEAR GRAM STAIN: CPT

## 2025-06-14 PROCEDURE — 80048 BASIC METABOLIC PNL TOTAL CA: CPT

## 2025-06-14 RX ORDER — GUAIFENESIN 600 MG/1
600 TABLET, EXTENDED RELEASE ORAL EVERY 12 HOURS
Status: DISCONTINUED | OUTPATIENT
Start: 2025-06-14 | End: 2025-06-17 | Stop reason: HOSPADM

## 2025-06-14 RX ORDER — METHADONE HYDROCHLORIDE 40 MG/1
40 TABLET ORAL DAILY
Refills: 0 | Status: DISCONTINUED | OUTPATIENT
Start: 2025-06-14 | End: 2025-06-17 | Stop reason: HOSPADM

## 2025-06-14 RX ORDER — HYDROCODONE BITARTRATE AND HOMATROPINE METHYLBROMIDE ORAL SOLUTION 5; 1.5 MG/5ML; MG/5ML
5 LIQUID ORAL EVERY 4 HOURS PRN
Refills: 0 | Status: DISCONTINUED | OUTPATIENT
Start: 2025-06-14 | End: 2025-06-17 | Stop reason: HOSPADM

## 2025-06-14 RX ORDER — ALBUTEROL SULFATE 90 UG/1
2 INHALANT RESPIRATORY (INHALATION) EVERY 4 HOURS PRN
Status: DISCONTINUED | OUTPATIENT
Start: 2025-06-14 | End: 2025-06-17 | Stop reason: HOSPADM

## 2025-06-14 RX ADMIN — AMPICILLIN AND SULBACTAM 3 G: 1; 2 INJECTION, POWDER, FOR SOLUTION INTRAMUSCULAR; INTRAVENOUS at 12:55

## 2025-06-14 RX ADMIN — AZITHROMYCIN DIHYDRATE 500 MG: 250 TABLET ORAL at 18:43

## 2025-06-14 RX ADMIN — GUAIFENESIN 600 MG: 600 TABLET, EXTENDED RELEASE ORAL at 19:33

## 2025-06-14 RX ADMIN — CITALOPRAM HYDROBROMIDE 40 MG: 20 TABLET ORAL at 05:19

## 2025-06-14 RX ADMIN — GUAIFENESIN 600 MG: 600 TABLET, EXTENDED RELEASE ORAL at 10:52

## 2025-06-14 RX ADMIN — AMPICILLIN AND SULBACTAM 3 G: 1; 2 INJECTION, POWDER, FOR SOLUTION INTRAMUSCULAR; INTRAVENOUS at 18:43

## 2025-06-14 RX ADMIN — AMPICILLIN AND SULBACTAM 3 G: 1; 2 INJECTION, POWDER, FOR SOLUTION INTRAMUSCULAR; INTRAVENOUS at 00:59

## 2025-06-14 RX ADMIN — METHADONE HYDROCHLORIDE 40 MG: 40 TABLET ORAL at 05:33

## 2025-06-14 RX ADMIN — Medication 1 LOZENGE: at 07:47

## 2025-06-14 RX ADMIN — HYDROCODONE BITARTRATE AND HOMATROPINE METHYLBROMIDE 5 ML: 1.5; 5 SOLUTION ORAL at 23:47

## 2025-06-14 RX ADMIN — METHADONE HYDROCHLORIDE 35 MG: 10 TABLET ORAL at 05:33

## 2025-06-14 RX ADMIN — AMPICILLIN AND SULBACTAM 3 G: 1; 2 INJECTION, POWDER, FOR SOLUTION INTRAMUSCULAR; INTRAVENOUS at 05:22

## 2025-06-14 RX ADMIN — AMPICILLIN AND SULBACTAM 3 G: 1; 2 INJECTION, POWDER, FOR SOLUTION INTRAMUSCULAR; INTRAVENOUS at 23:52

## 2025-06-14 RX ADMIN — SODIUM CHLORIDE: 9 INJECTION, SOLUTION INTRAVENOUS at 06:22

## 2025-06-14 ASSESSMENT — PAIN DESCRIPTION - PAIN TYPE
TYPE: ACUTE PAIN
TYPE: ACUTE PAIN

## 2025-06-14 ASSESSMENT — SOCIAL DETERMINANTS OF HEALTH (SDOH)
IN THE PAST 12 MONTHS, HAS THE ELECTRIC, GAS, OIL, OR WATER COMPANY THREATENED TO SHUT OFF SERVICE IN YOUR HOME?: NO
WITHIN THE PAST 12 MONTHS, THE FOOD YOU BOUGHT JUST DIDN'T LAST AND YOU DIDN'T HAVE MONEY TO GET MORE: NEVER TRUE
WITHIN THE PAST 12 MONTHS, YOU WORRIED THAT YOUR FOOD WOULD RUN OUT BEFORE YOU GOT THE MONEY TO BUY MORE: NEVER TRUE

## 2025-06-14 ASSESSMENT — FIBROSIS 4 INDEX: FIB4 SCORE: 1.35

## 2025-06-14 NOTE — PROGRESS NOTES
Laura RN collected vape pen from patient. Security to come pick it up. Advised this RN they do not stored. It is with the patient's chart.

## 2025-06-14 NOTE — PROGRESS NOTES
[facility] Medicine Daily Progress Note    Date of Service  6/14/2025    Chief Complaint  GENESIS MESSINA is a 34 y.o. male admitted 6/13/2025 with hypoxia, cough    Hospital Course  Is a 34-year-old male who came in with shortness of breath and cough for the last 2 weeks.  He came in hypoxic only 82% on room air.  Imaging consistent with multifocal pneumonia.  He has not been gardening and has not had any exposure to miracle grow soil.  He does use cocaine marijuana methamphetamine but also continues on his methadone.  Started on Unasyn and azithromycin for multifocal pneumonia.    Interval Problem Update  6/14 patient states he feels about the same no real change since admission.  I will start him on Mucinex, Hycodan and albuterol to help with the cough.  Will continue on antibiotics and awaiting respiratory PCR.    I have discussed this patient's plan of care and discharge plan at IDT rounds today with Case Management, Nursing, Nursing leadership, and other members of the IDT team.    Consultants/Specialty  none    Code Status  Full Code    Disposition  <MEDICALLYCLEARED>  I have placed the appropriate orders for post-discharge needs.    Review of Systems  ROS     Physical Exam  Temp:  [35.4 °C (95.7 °F)-38.1 °C (100.6 °F)] 36.9 °C (98.5 °F)  Pulse:  [55-92] 77  Resp:  [16-19] 18  BP: (116-137)/(59-79) 122/64  SpO2:  [82 %-94 %] 91 %    Physical Exam    Fluids    Intake/Output Summary (Last 24 hours) at 6/14/2025 1504  Last data filed at 6/14/2025 0720  Gross per 24 hour   Intake 1152.47 ml   Output 775 ml   Net 377.47 ml        Laboratory  Recent Labs     06/13/25 1857 06/14/25 0053   WBC 8.4 5.7   RBC 3.44* 4.08*   HEMOGLOBIN 12.1* 11.5*   HEMATOCRIT 31.4* 35.2*   MCV 91.3 86.3   MCH 28.5 28.2   MCHC 33.4 32.7   RDW 39.1 38.7   PLATELETCT 273 232   MPV 10.8 10.5     Recent Labs     06/13/25 1857 06/14/25 0053   SODIUM 139 139   POTASSIUM 4.7 4.2   CHLORIDE 99 104   CO2 25 25   GLUCOSE 108* 125*   BUN  14 13   CREATININE 0.92 0.74   CALCIUM 8.6 7.9*                   Imaging  EC-ECHOCARDIOGRAM COMPLETE W/O CONT   Final Result      DX-CHEST-PORTABLE (1 VIEW)   Final Result      Left lower lobe consolidation and probable additional right perihilar airspace opacities suggesting multifocal pneumonia. Small left pleural effusion.           Assessment/Plan  * Multifocal pneumonia- (present on admission)  Assessment & Plan  Patient for the past 2 weeks has been short of breath  Imaging studies revealed bilateral multifocal pneumonia  Sputum culture has been requested  Respiratory PCR has been requested  Blood cultures - no growth   Continue Unasyn and azithromycin  White blood cell count currently not elevated  Pro-Wili is elevated at 0.26      Nicotine use disorder- (present on admission)  Assessment & Plan  Patient uses oral nicotine pouches as well as vaping  I have started the patient on nicotine replacement protocol  I discussed with him the options of quitting right now he was not sure about quitting  Discussion held for 11 minutes at bedside    Normochromic normocytic anemia- (present on admission)  Assessment & Plan  I will order an iron panel and a B12 level    Acute respiratory failure with hypoxia (HCC)- (present on admission)  Assessment & Plan  Requiring between 2 and 2.5 liters to maintain adequate saturation    Opiate dependence, continuous (HCC)- (present on admission)  Assessment & Plan  For the methamphetamine abuse patient has been placed on methadone 75 mg daily  He did bring his own medication with him and pharmacy at this point has reviewed the medications we will continue this for the time being.    Polysubstance abuse (HCC)- (present on admission)  Assessment & Plan  Patient uses methamphetamine as well as THC as well as cocaine as well as opiates  current urine drug screen for methamphetamine as well as methadone  Echocardiogram shows no significant valvular disease    Anxiety- (present on  admission)  Assessment & Plan  I will continue patient on Celexa 40 mg daily         VTE prophylaxis: VTE Selection    I have performed a physical exam and reviewed and updated ROS and Plan today (6/14/2025). In review of yesterday's note (6/13/2025), there are no changes except as documented above.    My total time spent caring for the patient on the day of the encounter was 55 minutes.   This does not include time spent on separately billable procedures/tests.

## 2025-06-14 NOTE — ASSESSMENT & PLAN NOTE
Patient uses methamphetamine as well as THC as well as cocaine as well as opiates  current urine drug screen for methamphetamine as well as methadone  Echocardiogram shows no significant valvular disease

## 2025-06-14 NOTE — HOSPITAL COURSE
Is a 34-year-old male who came in with shortness of breath and cough for the last 2 weeks.  He came in hypoxic only 82% on room air.  Imaging consistent with multifocal pneumonia.  He has not been gardening and has not had any exposure to miracle grow soil.  He does use cocaine marijuana methamphetamine but also continues on his methadone.  Started on Unasyn and azithromycin for multifocal pneumonia.

## 2025-06-14 NOTE — ASSESSMENT & PLAN NOTE
Patient uses oral nicotine pouches as well as vaping  I have started the patient on nicotine replacement protocol  I discussed with him the options of quitting right now he was not sure about quitting  Discussion held for 11 minutes at bedside

## 2025-06-14 NOTE — PROGRESS NOTES
4 Eyes Skin Assessment Completed by DINORAH Dee and DINORAH Estes.    Skin assessment is primarily focused on high risk bony prominences. Pay special attention to skin beneath and around medical devices, high risk bony prominences, skin to skin areas and areas where the patient lacks sensation to feel pain and areas where the patient previously had breakdown.     Head (Occipital):  Scab and Red, scratches   Ears (Under Medical Devices): WDL   Nose (Under Medical Devices): Scab   Mouth:  WDL   Neck: WDL   Breast/Chest:  WDL   Shoulder Blades:  WDL   Spine:   WDL   (R) Arm/Elbow/Hand: Scab and Bruising   (L) Arm/Elbow/Hand: Scab and Bruising   Abdomen: WDL   Pannus/Groin:  WDL   Sacrum/Coccyx:   Brown and Blanching, Mepliex applied   (R) Ischial Tuberosity (Sit Bones):  WDL   (L) Ischial Tuberosity (Sit Bones):  WDL   (R) Leg:  WDL   (L) Leg:  WDL   (R) Heel:  WDL   (R) Foot/Toe: WDL   (L) Heel: WDL   (L) Foot/Toe:  **Wound/discoloration of bony prominence (Suspected Pressure injury)**       DEVICES IN USE:   Respiratory Devices:  Nasal cannula  Feeding Devices:  NG tube   Lines & BP Monitoring Devices:  Pulse ox    Orthopedic Devices:  N/A  Miscellaneous Devices:  Riddle    PROTOCOL INTERVENTIONS:   Low Airloss Bed:  Already in place  Heel Float Boots:  Reinforced/reapplied  Float Heels with Pillows:  Already in place  Q2 Turns with Pillows:  Already in place  Riddle:  Already in place  Nasal Cannula with Gray Foams:  Already in place    WOUND PHOTOS:   Completed and in EPIC     WOUND CONSULT:   Wound team already following area(s) of concern

## 2025-06-14 NOTE — ED TRIAGE NOTES
Chief Complaint   Patient presents with    Flu Like Symptoms     Cough, sob, dizziness, sore throat, chills and body aches      Last used Fentanyl and Meth yesterday. Plan was to go to his dads house today to start getting clean but because of how he is feeling came here to ER.

## 2025-06-14 NOTE — ASSESSMENT & PLAN NOTE
Patient for the past 2 weeks has been short of breath  Imaging studies revealed bilateral multifocal pneumonia  Sputum culture has been requested  Respiratory PCR has been requested  Blood cultures - no growth   Respiratory PCR shows mycoplasma pneumonia -completed azithromycin this am  White blood cell count currently not elevated even with initiation of steroids  Pro-Wili is elevated at 0.26

## 2025-06-14 NOTE — ASSESSMENT & PLAN NOTE
For the methamphetamine abuse patient has been placed on methadone 75 mg daily  He did bring his own medication with him and pharmacy at this point has reviewed the medications we will continue this for the time being.

## 2025-06-14 NOTE — ASSESSMENT & PLAN NOTE
Requiring 1-2 liters to maintain adequate saturation  Trial steroids, prednisone 40 mg daily x 5 days

## 2025-06-14 NOTE — CARE PLAN
The patient is Stable - Low risk of patient condition declining or worsening    Shift Goals  Clinical Goals: pain control, montior O2  Patient Goals: rest, pain control, cough suppresant    Progress made toward(s) clinical / shift goals:  Pain wnl per patient. O2 sats maintained      Problem: Care Map:  Optimal Outcome for the Pneumonia Patient  Goal: Collection and monitoring of appropriate tests and labs  Outcome: Progressing     Problem: Respiratory  Goal: Patient will achieve/maintain optimum respiratory ventilation and gas exchange  Outcome: Progressing     Problem: Risk for Aspiration  Goal: Patient's risk for aspiration will be absent or decrease  Outcome: Progressing     Problem: Hemodynamics - Pneumonia  Goal: Patient's hemodynamics, fluid balance and neurologic status will be stable or improve  Outcome: Progressing     Problem: Discharge Planning - Pneumonia  Goal: Patient will verbalize understanding of lifestyle changes and therapeutic needs post discharge  Outcome: Progressing     Problem: Knowledge Deficit - Standard  Goal: Patient and family/care givers will demonstrate understanding of plan of care, disease process/condition, diagnostic tests and medications  Outcome: Progressing     Problem: Pain - Standard  Goal: Alleviation of pain or a reduction in pain to the patient’s comfort goal  Outcome: Progressing

## 2025-06-14 NOTE — H&P
Hospital Medicine History & Physical Note    Date of Service  6/13/2025    Primary Care Physician  Pcp Pt States None    Consultants  None    Specialist Names: None    Code Status  Full Code    Chief Complaint  Chief Complaint   Patient presents with    Flu Like Symptoms     Cough, sob, dizziness, sore throat, chills and body aches        History of Presenting Illness  GENESIS MESSINA is a 34 y.o. male who presented 6/13/2025 with shortness of breath.  Patient reports shortness of breath going on for 2 weeks.  The shortness of breath is accompanied by hypoxia with the patient being only 82% on room air.  Imaging studies reveal multifocal pneumonia.  The patient unfortunately says he relapsed from methamphetamine use for which she is on methadone to combat it.  The patient also occasionally uses cocaine marijuana and habitually uses nicotine products.  I will initiate him on nicotine replacement.  I will continue on methadone.  For his multifocal pneumonia after obtaining cultures I will follow procalcitonin level lactic acid level and white blood cell count and I will initiate him on Unasyn and azithromycin..    I discussed the plan of care with patient, bedside RN, pharmacy, and emergency room physician Dr. Leahy.    Review of Systems  Review of Systems   Constitutional:  Positive for chills and malaise/fatigue. Negative for diaphoresis and fever.        Decreased appetite   HENT:  Positive for sore throat. Negative for nosebleeds and sinus pain.    Eyes: Negative.  Negative for double vision, photophobia, discharge and redness.   Respiratory:  Positive for cough and shortness of breath. Negative for sputum production, wheezing and stridor.    Cardiovascular: Negative.  Negative for chest pain, orthopnea, leg swelling and PND.   Gastrointestinal: Negative.  Negative for abdominal pain, blood in stool and heartburn.   Genitourinary: Negative.  Negative for dysuria, flank pain and frequency.    Musculoskeletal: Negative.  Negative for back pain, falls and myalgias.   Skin: Negative.  Negative for itching.   Neurological:  Positive for dizziness. Negative for tingling, sensory change, focal weakness and seizures.   Endo/Heme/Allergies: Negative.  Negative for polydipsia. Does not bruise/bleed easily.   Psychiatric/Behavioral: Negative.  Negative for depression, substance abuse and suicidal ideas. The patient does not have insomnia.    All other systems reviewed and are negative.      Past Medical History   has no past medical history on file.    Surgical History   has a past surgical history that includes dental extraction(s); tonsillectomy; and irrigation & debridement ortho (Right, 9/12/2020).     Family History  No family history on file.     Family history reviewed with patient. There is no family history that is pertinent to the chief complaint.     Social History   reports that he has been smoking cigarettes. He quit smokeless tobacco use about 7 years ago.  His smokeless tobacco use included chew. He reports that he does not currently use drugs after having used the following drugs: IV, Methamphetamines, and Intravenous. He reports that he does not drink alcohol.    Allergies  Allergies[1]    Medications  Prior to Admission Medications   Prescriptions Last Dose Informant Patient Reported? Taking?   citalopram (CELEXA) 20 MG Tab 6/13/2025 Morning  Yes Yes   Sig: Take 40 mg by mouth every day.   methadone (DOLOPHINE) 10 MG/ML Conc 6/13/2025 Morning  Yes Yes   Sig: Take 75 mg by mouth every day.      Facility-Administered Medications: None       Physical Exam  Temp:  [38.1 °C (100.6 °F)] 38.1 °C (100.6 °F)  Pulse:  [73-92] 74  Resp:  [18] 18  BP: (116-135)/(65-69) 126/67  SpO2:  [82 %-93 %] 93 %  Blood Pressure: 126/67   Temperature: (!) 38.1 °C (100.6 °F)   Pulse: 74   Respiration: 18   Pulse Oximetry: 93 %       Physical Exam  Vitals and nursing note reviewed. Exam conducted with a chaperone  present.   Constitutional:       General: He is not in acute distress.     Appearance: Normal appearance. He is well-developed and normal weight. He is ill-appearing. He is not toxic-appearing or diaphoretic.   HENT:      Head: Normocephalic and atraumatic.      Right Ear: External ear normal.      Left Ear: External ear normal.      Nose: Nose normal.      Mouth/Throat:      Mouth: Mucous membranes are dry.      Pharynx: Oropharynx is clear. No oropharyngeal exudate or posterior oropharyngeal erythema.   Eyes:      Extraocular Movements: Extraocular movements intact.      Conjunctiva/sclera: Conjunctivae normal.      Pupils: Pupils are equal, round, and reactive to light.   Neck:      Thyroid: No thyromegaly.      Vascular: No carotid bruit or JVD.   Cardiovascular:      Rate and Rhythm: Normal rate and regular rhythm.      Pulses: Normal pulses.      Heart sounds: Normal heart sounds.   Pulmonary:      Effort: Pulmonary effort is normal. Tachypnea present.      Breath sounds: Decreased air movement present. Examination of the right-lower field reveals decreased breath sounds. Examination of the left-lower field reveals decreased breath sounds. Decreased breath sounds present.   Chest:      Chest wall: No tenderness.   Abdominal:      General: Abdomen is flat. Bowel sounds are normal. There is no distension.      Palpations: Abdomen is soft. There is no mass.      Tenderness: There is no abdominal tenderness. There is no guarding or rebound.   Musculoskeletal:         General: Normal range of motion.      Cervical back: Normal range of motion and neck supple.      Right lower leg: No edema.      Left lower leg: No edema.   Lymphadenopathy:      Cervical: No cervical adenopathy.   Skin:     General: Skin is warm and dry.      Capillary Refill: Capillary refill takes more than 3 seconds.      Findings: Rash (Both ankles which the patient believes to be secondary to sweat) present.   Neurological:      General: No  "focal deficit present.      Mental Status: He is alert and oriented to person, place, and time. Mental status is at baseline.      GCS: GCS eye subscore is 4. GCS verbal subscore is 5. GCS motor subscore is 6.      Cranial Nerves: No cranial nerve deficit.      Motor: No weakness.      Gait: Gait normal.      Deep Tendon Reflexes: Reflexes are normal and symmetric.   Psychiatric:         Attention and Perception: He is inattentive.         Mood and Affect: Mood is anxious. Affect is flat.         Speech: Speech is delayed.         Behavior: Behavior is slowed.         Cognition and Memory: Cognition and memory normal.         Judgment: Judgment normal.         Laboratory:  Recent Labs     06/13/25  1857   WBC 8.4   RBC 3.44*   HEMOGLOBIN 12.1*   HEMATOCRIT 31.4*   MCV 91.3   MCH 28.5   MCHC 33.4   RDW 39.1   PLATELETCT 273   MPV 10.8     Recent Labs     06/13/25  1857   SODIUM 139   POTASSIUM 4.7   CHLORIDE 99   CO2 25   GLUCOSE 108*   BUN 14   CREATININE 0.92   CALCIUM 8.6     Recent Labs     06/13/25  1857   ALTSGPT 44   ASTSGOT 63*   ALKPHOSPHAT 75   TBILIRUBIN 0.5   GLUCOSE 108*         No results for input(s): \"NTPROBNP\" in the last 72 hours.      No results for input(s): \"TROPONINT\" in the last 72 hours.    Imaging:  DX-CHEST-PORTABLE (1 VIEW)   Final Result      Left lower lobe consolidation and probable additional right perihilar airspace opacities suggesting multifocal pneumonia. Small left pleural effusion.      EC-ECHOCARDIOGRAM COMPLETE W/O CONT    (Results Pending)       X-Ray:  I have personally reviewed the images and compared with prior images.  EKG:  I have personally reviewed the images and compared with prior images.    Assessment/Plan:  Justification for Admission Status  I anticipate this patient will require at least two midnights for appropriate medical management, necessitating inpatient admission because patient has acute hypoxic respiratory failure with multifocal pneumonia will require at " least 48 hours of inpatient management    Patient will need a Telemetry bed on MEDICAL service .  The need is secondary to multifocal pneumonia with acute hypoxic respiratory failure.    * Multifocal pneumonia- (present on admission)  Assessment & Plan  Patient for the past 2 weeks has been short of breath  Imaging studies revealed bilateral multifocal pneumonia  Sputum culture has been requested  Respiratory PCR has been requested  Blood cultures 2 times has been requested  Initiation of Unasyn with addition of azithromycin has been done by me.  White blood cell count currently not elevated      Acute respiratory failure with hypoxia (HCC)- (present on admission)  Assessment & Plan  Acute respiratory failure with patient being only 82% on room air  I will continue oxygen titration to keep oxygen saturations above 95    Polysubstance abuse (HCC)- (present on admission)  Assessment & Plan  Patient uses methamphetamine as well as THC as well as cocaine as well as opiates  I will order a current urine drug screen  I will order an echocardiogram to make sure patient does not have underlying endocarditis that is responsible for his hypoxia    Nicotine use disorder- (present on admission)  Assessment & Plan  Patient uses oral nicotine pouches as well as vaping  I have started the patient on nicotine replacement protocol  I discussed with him the options of quitting right now he was not sure about quitting  Discussion held for 11 minutes at bedside    Normochromic normocytic anemia- (present on admission)  Assessment & Plan  I will order an iron panel and a B12 level    Opiate dependence, continuous (HCC)- (present on admission)  Assessment & Plan  For the methamphetamine abuse patient has been placed on methadone 75 mg daily  He did bring his own medication with him and pharmacy at this point has reviewed the medications we will continue this for the time being.    Anxiety- (present on admission)  Assessment & Plan  I  will continue patient on Celexa 40 mg daily        VTE prophylaxis: SCDs/TEDs       [1] No Known Allergies

## 2025-06-14 NOTE — ED NOTES
Medication history reviewed with patient. Med rec is complete.    Patient has his to go methadone liquid bottles for the weekend with him and dose was verified from bottles.    RN aware patient has his home meds with him.    Allergies reviewed.     Patient denies any outpatient antibiotics in the last 30 days.     Anticoagulants (rivaroxaban, apixaban, edoxaban, dabigatran, enoxaparin) taken in the last 14 days? No    Ferdinand Freeman, KevinD

## 2025-06-14 NOTE — ED PROVIDER NOTES
"ED Provider Note    CHIEF COMPLAINT  Chief Complaint   Patient presents with    Flu Like Symptoms     Cough, sob, dizziness, sore throat, chills and body aches        EXTERNAL RECORDS REVIEWED  Other patient has been seen at multiple outpatient facilities throughout the last 6 months to include an ER visit at [facility] 1/21/2025 which was reviewed.  He was then seen at [facility] ER 1/28/2025 for polysubstance ingestion.  Later followed up at [facility] [place] urgent care for 1325 for possible STD exposure.    HPI/ROS  LIMITATION TO HISTORY   Select: : None  OUTSIDE HISTORIAN(S):  none    GENESIS MESSINA is a 34 y.o. male who presents to the [facility] apartment feeling generally unwell for the last 2 weeks.  Past medical history significant for polysubstance use to include methamphetamine and fentanyl.  He smokes the substances.  Last intake was last night.  Says that he \"relapsed \"a few weeks ago when he started to feel unwell after having not used for a little while.  That his 5-year-old daughter had recent similar URI symptoms prior to the onset of his symptoms.  At this point endorses ongoing cough, dyspnea, slight sore throat, myalgias, arthralgias and slight headache.  Slight decreased p.o. intake.  Recent travel or trauma.  Denies any recent injectable drug use although he does have a remote history of this.    PAST MEDICAL HISTORY       SURGICAL HISTORY   has a past surgical history that includes dental extraction(s); tonsillectomy; and irrigation & debridement ortho (Right, 9/12/2020).    FAMILY HISTORY  No family history on file.    SOCIAL HISTORY  Social History     Tobacco Use    Smoking status: Every Day     Current packs/day: 0.50     Types: Cigarettes    Smokeless tobacco: Former     Types: Chew     Quit date: 5/9/2018   Vaping Use    Vaping status: Former   Substance and Sexual Activity    Alcohol use: No    Drug use: Not Currently     Types: IV, Methamphetamines, Intravenous     " "Comment: heroin/meth, fentanyl, benzos    Sexual activity: Not on file       CURRENT MEDICATIONS  Home Medications       Reviewed by Abimael Tolbert M.D. (Physician) on 06/13/25 at 2027  Med List Status: Not Addressed     Medication Last Dose Status   citalopram (CELEXA) 20 MG Tab  Active   methadone (DOLOPHINE) 10 MG Tab  Active                  Audit from Redirected Encounters    **Home medications have not yet been reviewed for this encounter**         ALLERGIES  Allergies[1]    PHYSICAL EXAM  VITAL SIGNS: /67   Pulse 74   Temp (!) 38.1 °C (100.6 °F) (Oral)   Resp 18   Ht 1.778 m (5' 10\")   Wt 73.3 kg (161 lb 9.6 oz)   SpO2 93%   BMI 23.19 kg/m²      Pulse ox interpretation: I interpret this pulse ox as normal.  Constitutional: Alert in no apparent distress.  Appears fatigued.  HENT: No signs of trauma, Bilateral external ears normal, Nose normal.   Eyes: Pupils are equal and reactive   Neck: Normal range of motion, No tenderness, Supple  Cardiovascular: Regular rate and rhythm, no murmurs.   Thorax & Lungs: Slight increased work of breathing.  Nasal cannula oxygen supplementation in place.  Abdomen: Bowel sounds normal, Soft, No tenderness  Skin: Warm, Dry, No erythema, No rash.   Musculoskeletal: Good range of motion in all major joints. No tenderness to palpation or major deformities noted.   Neurologic: Alert , Normal motor function, Normal sensory function, No focal deficits noted.   Psychiatric: Affect normal, Judgment normal, Mood normal.         EKG/LABS  Results for orders placed or performed during the hospital encounter of 06/13/25   CoV-2, Flu A/B, And RSV by PCR ([facility])    Collection Time: 06/13/25  6:55 PM    Specimen: Nasopharyngeal; Respirate   Result Value Ref Range    Influenza virus A RNA Negative Negative    Influenza virus B, PCR Negative Negative    RSV, PCR Negative Negative    SARS-CoV-2 by PCR NotDetected     SARS-CoV-2 Source Nasal Swab    Lactic Acid    Collection Time: " 06/13/25  6:57 PM   Result Value Ref Range    Lactic Acid 1.2 0.5 - 2.0 mmol/L   CBC with Differential    Collection Time: 06/13/25  6:57 PM   Result Value Ref Range    WBC 8.4 4.8 - 10.8 K/uL    RBC 3.44 (L) 4.70 - 6.10 M/uL    Hemoglobin 12.1 (L) 14.0 - 18.0 g/dL    Hematocrit 31.4 (L) 42.0 - 52.0 %    MCV 91.3 81.4 - 97.8 fL    MCH 28.5 27.0 - 33.0 pg    MCHC 33.4 32.3 - 36.5 g/dL    RDW 39.1 35.9 - 50.0 fL    Platelet Count 273 164 - 446 K/uL    MPV 10.8 9.0 - 12.9 fL    Neutrophils-Polys 85.00 (H) 44.00 - 72.00 %    Lymphocytes 7.20 (L) 22.00 - 41.00 %    Monocytes 6.90 0.00 - 13.40 %    Eosinophils 0.00 0.00 - 6.90 %    Basophils 0.10 0.00 - 1.80 %    Immature Granulocytes 0.80 0.00 - 0.90 %    Nucleated RBC 0.00 0.00 - 0.20 /100 WBC    Neutrophils (Absolute) 7.17 1.82 - 7.42 K/uL    Lymphs (Absolute) 0.61 (L) 1.00 - 4.80 K/uL    Monos (Absolute) 0.58 0.00 - 0.85 K/uL    Eos (Absolute) 0.00 0.00 - 0.51 K/uL    Baso (Absolute) 0.01 0.00 - 0.12 K/uL    Immature Granulocytes (abs) 0.07 0.00 - 0.11 K/uL    NRBC (Absolute) 0.00 K/uL   Complete Metabolic Panel    Collection Time: 06/13/25  6:57 PM   Result Value Ref Range    Sodium 139 135 - 145 mmol/L    Potassium 4.7 3.6 - 5.5 mmol/L    Chloride 99 96 - 112 mmol/L    Co2 25 20 - 33 mmol/L    Anion Gap 15.0 7.0 - 16.0    Glucose 108 (H) 65 - 99 mg/dL    Bun 14 8 - 22 mg/dL    Creatinine 0.92 0.50 - 1.40 mg/dL    Calcium 8.6 8.5 - 10.5 mg/dL    Correct Calcium 8.9 8.5 - 10.5 mg/dL    AST(SGOT) 63 (H) 12 - 45 U/L    ALT(SGPT) 44 2 - 50 U/L    Alkaline Phosphatase 75 30 - 99 U/L    Total Bilirubin 0.5 0.1 - 1.5 mg/dL    Albumin 3.6 3.2 - 4.9 g/dL    Total Protein 7.0 6.0 - 8.2 g/dL    Globulin 3.4 1.9 - 3.5 g/dL    A-G Ratio 1.1 g/dL   ESTIMATED GFR    Collection Time: 06/13/25  6:57 PM   Result Value Ref Range    GFR (CKD-EPI) 112 >60 mL/min/1.73 m 2   Urinalysis    Collection Time: 06/13/25  7:50 PM    Specimen: Urine   Result Value Ref Range    Color Yellow      Character Clear     Specific Gravity 1.007 <1.035    Ph 6.5 5.0 - 8.0    Glucose Negative Negative mg/dL    Ketones 15 (A) Negative mg/dL    Protein Negative Negative mg/dL    Bilirubin Negative Negative    Urobilinogen, Urine 1.0 <=1.0 EU/dL    Nitrite Negative Negative    Leukocyte Esterase Negative Negative    Occult Blood Negative Negative    Micro Urine Req see below    EKG    Collection Time: 25  8:29 PM   Result Value Ref Range    Report       [facility] Emergency Dept.    Test Date:  2025  Pt Name:    GENESIS MESSINA            Department: [facility]  MRN:        [id number]                      [facility]  Gender:     Male                         Technician: [id number]  :        1990                   Requested By:ER TRIAGE PROTOCOL  Order #:    [id number]                    Reading MD: Jim Leahy    Measurements  Intervals                                Axis  Rate:       100                          P:          67  MN:         133                          QRS:        66  QRSD:       96                           T:          44  QT:         365  QTc:        471    Interpretive Statements  Sinus tachycardia  Multiform ventricular premature complexes  Borderline prolonged QT interval  Compared to ECG 2025 14:16:34  Ventricular premature complex(es) now present  Sinus rhythm no longer present  Myocardial infarct finding no longer present  Possible ischemia no longer present  Electro nically Signed On 2025 20:29:08 PDT by Jim Leahy           RADIOLOGY/PROCEDURES   I have independently interpreted the diagnostic imaging associated with this visit and am waiting the final reading from the radiologist.   My preliminary interpretation is as follows: Bilateral consolidative processes noted    Radiologist interpretation:  DX-CHEST-PORTABLE (1 VIEW)   Final Result      Left lower lobe consolidation and probable additional right perihilar airspace opacities  suggesting multifocal pneumonia. Small left pleural effusion.          COURSE & MEDICAL DECISION MAKING    ASSESSMENT, COURSE AND PLAN  Care Narrative: Patient resenting the emergency department appearing ill.  Given respiratory symptoms concern for lower respiratory tract infection.  Sepsis protocol will be ordered to include early IV fluids, antibiotics and cultures.    DISPOSITION AND DISCUSSIONS  I have discussed management of the patient with the following physicians and MAGDIEL's: 2027: Discussed case with hospitalist which will accept admission    Discussion of management with other Q or appropriate source(s): Pharmacy for medication verification     34-year-old male presenting to the emerged part with above presentation.  Triage vitals significant for hypoxia and fever.  Again sepsis protocol has been initiated.  Labs as above.  X-ray imaging interpreted as multifocal pneumonia.  Viral panel negative for COVID, flu and RSV.  Patient does have history of polysubstance use/abuse.  This includes THC, fentanyl and methamphetamine.  Patient reporting last use last night.  Does have remote history of IV drug abuse although he states not recently.    FINAL DIAGNOSIS  1. Multifocal pneumonia    2. Hypoxia    3. Sepsis, due to unspecified organism, unspecified whether acute organ dysfunction present (HCC)    4. Anemia, unspecified type         Electronically signed by: Jim Leahy M.D., 6/13/2025 6:32 PM           [1] No Known Allergies

## 2025-06-15 LAB
ANION GAP SERPL CALC-SCNC: 10 MMOL/L (ref 7–16)
BUN SERPL-MCNC: 8 MG/DL (ref 8–22)
CALCIUM SERPL-MCNC: 7.8 MG/DL (ref 8.5–10.5)
CHLORIDE SERPL-SCNC: 99 MMOL/L (ref 96–112)
CO2 SERPL-SCNC: 25 MMOL/L (ref 20–33)
CREAT SERPL-MCNC: 0.74 MG/DL (ref 0.5–1.4)
ERYTHROCYTE [DISTWIDTH] IN BLOOD BY AUTOMATED COUNT: 39.8 FL (ref 35.9–50)
GFR SERPLBLD CREATININE-BSD FMLA CKD-EPI: 122 ML/MIN/1.73 M 2
GLUCOSE SERPL-MCNC: 110 MG/DL (ref 65–99)
GRAM STN SPEC: NORMAL
HCT VFR BLD AUTO: 33.1 % (ref 42–52)
HGB BLD-MCNC: 11.7 G/DL (ref 14–18)
L PNEUMO AG UR QL IA: NEGATIVE
MCH RBC QN AUTO: 32.3 PG (ref 27–33)
MCHC RBC AUTO-ENTMCNC: 35.3 G/DL (ref 32.3–36.5)
MCV RBC AUTO: 91.4 FL (ref 81.4–97.8)
PLATELET # BLD AUTO: 294 K/UL (ref 164–446)
PMV BLD AUTO: 10.8 FL (ref 9–12.9)
POTASSIUM SERPL-SCNC: 4.6 MMOL/L (ref 3.6–5.5)
RBC # BLD AUTO: 3.62 M/UL (ref 4.7–6.1)
S PNEUM AG UR QL: NEGATIVE
SIGNIFICANT IND 70042: NORMAL
SITE SITE: NORMAL
SODIUM SERPL-SCNC: 134 MMOL/L (ref 135–145)
SOURCE SOURCE: NORMAL
WBC # BLD AUTO: 7.1 K/UL (ref 4.8–10.8)

## 2025-06-15 PROCEDURE — 770020 HCHG ROOM/CARE - TELE (206)

## 2025-06-15 PROCEDURE — 700102 HCHG RX REV CODE 250 W/ 637 OVERRIDE(OP): Performed by: STUDENT IN AN ORGANIZED HEALTH CARE EDUCATION/TRAINING PROGRAM

## 2025-06-15 PROCEDURE — 700102 HCHG RX REV CODE 250 W/ 637 OVERRIDE(OP): Performed by: INTERNAL MEDICINE

## 2025-06-15 PROCEDURE — A9270 NON-COVERED ITEM OR SERVICE: HCPCS | Performed by: INTERNAL MEDICINE

## 2025-06-15 PROCEDURE — 94760 N-INVAS EAR/PLS OXIMETRY 1: CPT

## 2025-06-15 PROCEDURE — 80048 BASIC METABOLIC PNL TOTAL CA: CPT

## 2025-06-15 PROCEDURE — 36415 COLL VENOUS BLD VENIPUNCTURE: CPT

## 2025-06-15 PROCEDURE — 700102 HCHG RX REV CODE 250 W/ 637 OVERRIDE(OP): Performed by: HOSPITALIST

## 2025-06-15 PROCEDURE — 700111 HCHG RX REV CODE 636 W/ 250 OVERRIDE (IP): Performed by: INTERNAL MEDICINE

## 2025-06-15 PROCEDURE — 700105 HCHG RX REV CODE 258: Performed by: HOSPITALIST

## 2025-06-15 PROCEDURE — 99233 SBSQ HOSP IP/OBS HIGH 50: CPT | Performed by: INTERNAL MEDICINE

## 2025-06-15 PROCEDURE — 85027 COMPLETE CBC AUTOMATED: CPT

## 2025-06-15 PROCEDURE — A9270 NON-COVERED ITEM OR SERVICE: HCPCS | Performed by: HOSPITALIST

## 2025-06-15 PROCEDURE — 700111 HCHG RX REV CODE 636 W/ 250 OVERRIDE (IP): Mod: JZ | Performed by: HOSPITALIST

## 2025-06-15 PROCEDURE — A9270 NON-COVERED ITEM OR SERVICE: HCPCS | Performed by: STUDENT IN AN ORGANIZED HEALTH CARE EDUCATION/TRAINING PROGRAM

## 2025-06-15 RX ORDER — AZITHROMYCIN 250 MG/1
500 TABLET, FILM COATED ORAL DAILY
Status: COMPLETED | OUTPATIENT
Start: 2025-06-16 | End: 2025-06-16

## 2025-06-15 RX ORDER — PREDNISONE 20 MG/1
40 TABLET ORAL DAILY
Status: DISCONTINUED | OUTPATIENT
Start: 2025-06-15 | End: 2025-06-17 | Stop reason: HOSPADM

## 2025-06-15 RX ADMIN — Medication 1 LOZENGE: at 12:10

## 2025-06-15 RX ADMIN — GUAIFENESIN 600 MG: 600 TABLET, EXTENDED RELEASE ORAL at 05:03

## 2025-06-15 RX ADMIN — PREDNISONE 40 MG: 20 TABLET ORAL at 08:04

## 2025-06-15 RX ADMIN — METHADONE HYDROCHLORIDE 40 MG: 40 TABLET ORAL at 05:01

## 2025-06-15 RX ADMIN — CITALOPRAM HYDROBROMIDE 40 MG: 20 TABLET ORAL at 05:03

## 2025-06-15 RX ADMIN — METHADONE HYDROCHLORIDE 35 MG: 10 TABLET ORAL at 05:13

## 2025-06-15 RX ADMIN — GUAIFENESIN 600 MG: 600 TABLET, EXTENDED RELEASE ORAL at 18:21

## 2025-06-15 RX ADMIN — AMPICILLIN AND SULBACTAM 3 G: 1; 2 INJECTION, POWDER, FOR SOLUTION INTRAMUSCULAR; INTRAVENOUS at 05:11

## 2025-06-15 ASSESSMENT — ENCOUNTER SYMPTOMS
WEAKNESS: 0
CLAUDICATION: 0
NERVOUS/ANXIOUS: 0
INSOMNIA: 0
DEPRESSION: 0
SENSORY CHANGE: 0
HEARTBURN: 0
VOMITING: 0
DIARRHEA: 0
SPEECH CHANGE: 0
COUGH: 1
FEVER: 0
SPUTUM PRODUCTION: 1
PHOTOPHOBIA: 0
CONSTIPATION: 0
MYALGIAS: 0
SHORTNESS OF BREATH: 1
DIZZINESS: 0
CHILLS: 0
HEADACHES: 0
BLURRED VISION: 0
ABDOMINAL PAIN: 0

## 2025-06-15 ASSESSMENT — FIBROSIS 4 INDEX: FIB4 SCORE: 1.07

## 2025-06-15 ASSESSMENT — PAIN DESCRIPTION - PAIN TYPE
TYPE: ACUTE PAIN
TYPE: ACUTE PAIN

## 2025-06-15 NOTE — PROGRESS NOTES
Hospital Medicine Daily Progress Note    Date of Service  6/15/2025    Chief Complaint  GENESIS MESSINA is a 34 y.o. male admitted 6/13/2025 with hypoxia, cough    Hospital Course  Is a 34-year-old male who came in with shortness of breath and cough for the last 2 weeks.  He came in hypoxic only 82% on room air.  Imaging consistent with multifocal pneumonia.  He has not been gardening and has not had any exposure to miracle grow soil.  He does use cocaine marijuana methamphetamine but also continues on his methadone.  Started on Unasyn and azithromycin for multifocal pneumonia.    Interval Problem Update  6/14 patient states he feels about the same no real change since admission.  I will start him on Mucinex, Hycodan and albuterol to help with the cough.  Will continue on antibiotics and awaiting respiratory PCR.  6/15 showing improvements.  His oxygen needs are down to 1 L.  Respiratory PCR showed mycoplasma pneumonia therefore we will continue azithromycin but no longer needing Unasyn.  Will trial steroids to see if that will improve his respiratory status with prednisone.  Cough is better with current interventions.  Once able to wean from oxygen should be able to discharge home.  Likely in the next 24 to 48 hours..    I have discussed this patient's plan of care and discharge plan at IDT rounds today with Case Management, Nursing, Nursing leadership, and other members of the IDT team.    Consultants/Specialty  none    Code Status  Full Code    Disposition  The patient is not medically cleared for discharge to home or a post-acute facility.  Anticipate discharge to: home with close outpatient follow-up    I have placed the appropriate orders for post-discharge needs.    Review of Systems  Review of Systems   Constitutional:  Negative for chills and fever.   HENT:  Negative for congestion.    Eyes:  Negative for blurred vision and photophobia.   Respiratory:  Positive for cough, sputum production and  shortness of breath.    Cardiovascular:  Negative for chest pain, claudication and leg swelling.   Gastrointestinal:  Negative for abdominal pain, constipation, diarrhea, heartburn and vomiting.   Genitourinary:  Negative for dysuria and hematuria.   Musculoskeletal:  Negative for joint pain and myalgias.   Skin:  Negative for itching and rash.   Neurological:  Negative for dizziness, sensory change, speech change, weakness and headaches.   Psychiatric/Behavioral:  Negative for depression. The patient is not nervous/anxious and does not have insomnia.         Physical Exam  Temp:  [36.4 °C (97.5 °F)-38.1 °C (100.6 °F)] 37.1 °C (98.7 °F)  Pulse:  [67-78] 77  Resp:  [17-20] 18  BP: (115-125)/(59-68) 116/60  SpO2:  [92 %-100 %] 92 %    Physical Exam  Vitals and nursing note reviewed.   Constitutional:       General: He is not in acute distress.     Appearance: Normal appearance.   HENT:      Head: Normocephalic and atraumatic.   Eyes:      General: No scleral icterus.     Extraocular Movements: Extraocular movements intact.   Cardiovascular:      Rate and Rhythm: Normal rate and regular rhythm.      Pulses: Normal pulses.      Heart sounds: Normal heart sounds. No murmur heard.  Pulmonary:      Effort: Pulmonary effort is normal. No respiratory distress.      Breath sounds: Normal breath sounds. No wheezing, rhonchi or rales.   Abdominal:      General: Abdomen is flat. Bowel sounds are normal. There is no distension.      Palpations: Abdomen is soft.      Tenderness: There is no rebound.   Musculoskeletal:         General: No swelling or tenderness.      Cervical back: Normal range of motion and neck supple.   Lymphadenopathy:      Cervical: No cervical adenopathy.   Skin:     Coloration: Skin is not jaundiced.      Findings: No erythema.   Neurological:      General: No focal deficit present.      Mental Status: He is alert and oriented to person, place, and time. Mental status is at baseline.      Cranial Nerves: No  cranial nerve deficit.   Psychiatric:         Mood and Affect: Mood normal.         Behavior: Behavior normal.         Fluids    Intake/Output Summary (Last 24 hours) at 6/15/2025 1322  Last data filed at 6/14/2025 2000  Gross per 24 hour   Intake 120 ml   Output --   Net 120 ml        Laboratory  Recent Labs     06/13/25 1857 06/14/25 0053 06/15/25  0050   WBC 8.4 5.7 7.1   RBC 3.44* 4.08* 3.62*   HEMOGLOBIN 12.1* 11.5* 11.7*   HEMATOCRIT 31.4* 35.2* 33.1*   MCV 91.3 86.3 91.4   MCH 28.5 28.2 32.3   MCHC 33.4 32.7 35.3   RDW 39.1 38.7 39.8   PLATELETCT 273 232 294   MPV 10.8 10.5 10.8     Recent Labs     06/13/25 1857 06/14/25 0053 06/15/25  0050   SODIUM 139 139 134*   POTASSIUM 4.7 4.2 4.6   CHLORIDE 99 104 99   CO2 25 25 25   GLUCOSE 108* 125* 110*   BUN 14 13 8   CREATININE 0.92 0.74 0.74   CALCIUM 8.6 7.9* 7.8*                   Imaging  EC-ECHOCARDIOGRAM COMPLETE W/O CONT   Final Result      DX-CHEST-PORTABLE (1 VIEW)   Final Result      Left lower lobe consolidation and probable additional right perihilar airspace opacities suggesting multifocal pneumonia. Small left pleural effusion.           Assessment/Plan  * Multifocal pneumonia- (present on admission)  Assessment & Plan  Patient for the past 2 weeks has been short of breath  Imaging studies revealed bilateral multifocal pneumonia  Sputum culture has been requested  Respiratory PCR has been requested  Blood cultures - no growth   Continue azithromycin  Respiratory PCR shows mycoplasma pneumonia -complete azithromycin  White blood cell count currently not elevated  Pro-Wili is elevated at 0.26      Nicotine use disorder- (present on admission)  Assessment & Plan  Patient uses oral nicotine pouches as well as vaping  I have started the patient on nicotine replacement protocol  I discussed with him the options of quitting right now he was not sure about quitting  Discussion held for 11 minutes at bedside    Normochromic normocytic anemia- (present on  admission)  Assessment & Plan  I will order an iron panel and a B12 level    Acute respiratory failure with hypoxia (HCC)- (present on admission)  Assessment & Plan  Requiring 1-2 liters to maintain adequate saturation  Trial steroids, prednisone 40 mg daily x 5 days    Opiate dependence, continuous (HCC)- (present on admission)  Assessment & Plan  For the methamphetamine abuse patient has been placed on methadone 75 mg daily  He did bring his own medication with him and pharmacy at this point has reviewed the medications we will continue this for the time being.    Polysubstance abuse (HCC)- (present on admission)  Assessment & Plan  Patient uses methamphetamine as well as THC as well as cocaine as well as opiates  current urine drug screen for methamphetamine as well as methadone  Echocardiogram shows no significant valvular disease    Anxiety- (present on admission)  Assessment & Plan  I will continue patient on Celexa 40 mg daily         VTE prophylaxis:   SCDs/TEDs      I have performed a physical exam and reviewed and updated ROS and Plan today (6/15/2025). In review of yesterday's note (6/14/2025), there are no changes except as documented above.    My total time spent caring for the patient on the day of the encounter was 51 minutes.   This does not include time spent on separately billable procedures/tests.

## 2025-06-15 NOTE — PROGRESS NOTES
Telemetry Shift Summary     Per monitor tech:  Rhythm SR  HR Range 72-84   Ectopy None  Measurements 0.14/0.06/0.38      Normal Values  Rhythm SR  HR Range:   Measurements: 0.12-0.20/0.06-0.10/0.30-0.52

## 2025-06-15 NOTE — CARE PLAN
The patient is Stable - Low risk of patient condition declining or worsening    Shift Goals  Clinical Goals: wean O2, safety  Patient Goals: rest, go home    Progress made toward(s) clinical / shift goals:  Patient is using IS effectively in order to wean O2. Safety precaution in place. Patient up to chair.       Problem: Care Map:  Optimal Outcome for the Pneumonia Patient  Goal: Collection and monitoring of appropriate tests and labs  Outcome: Progressing     Problem: Respiratory  Goal: Patient will achieve/maintain optimum respiratory ventilation and gas exchange  Outcome: Progressing     Problem: Risk for Aspiration  Goal: Patient's risk for aspiration will be absent or decrease  Outcome: Progressing     Problem: Hemodynamics - Pneumonia  Goal: Patient's hemodynamics, fluid balance and neurologic status will be stable or improve  Outcome: Progressing     Problem: Discharge Planning - Pneumonia  Goal: Patient will verbalize understanding of lifestyle changes and therapeutic needs post discharge  Outcome: Progressing     Problem: Self Care  Goal: Patient will have the ability to perform ADLs independently or with assistance (bathe, groom, dress, toilet and feed)  Outcome: Progressing     Problem: Knowledge Deficit - Standard  Goal: Patient and family/care givers will demonstrate understanding of plan of care, disease process/condition, diagnostic tests and medications  Outcome: Progressing     Problem: Pain - Standard  Goal: Alleviation of pain or a reduction in pain to the patient’s comfort goal  Outcome: Progressing

## 2025-06-15 NOTE — CARE PLAN
The patient is Stable - Low risk of patient condition declining or worsening    Shift Goals  Clinical Goals: O2 monitoring  Patient Goals: comfort    Progress made toward(s) clinical / shift goals:    Problem: Care Map:  Optimal Outcome for the Pneumonia Patient  Goal: Collection and monitoring of appropriate tests and labs  Outcome: Progressing     Problem: Risk for Aspiration  Goal: Patient's risk for aspiration will be absent or decrease  Outcome: Progressing  Note: Patient following aspiration precautions.     Problem: Hemodynamics - Pneumonia  Goal: Patient's hemodynamics, fluid balance and neurologic status will be stable or improve  Outcome: Progressing  Note: VSS.        Patient is not progressing towards the following goals:

## 2025-06-16 LAB
ANION GAP SERPL CALC-SCNC: 10 MMOL/L (ref 7–16)
BACTERIA UR CULT: NORMAL
BUN SERPL-MCNC: 13 MG/DL (ref 8–22)
CALCIUM SERPL-MCNC: 8.4 MG/DL (ref 8.5–10.5)
CHLORIDE SERPL-SCNC: 103 MMOL/L (ref 96–112)
CO2 SERPL-SCNC: 25 MMOL/L (ref 20–33)
CREAT SERPL-MCNC: 0.66 MG/DL (ref 0.5–1.4)
ERYTHROCYTE [DISTWIDTH] IN BLOOD BY AUTOMATED COUNT: 39.9 FL (ref 35.9–50)
GFR SERPLBLD CREATININE-BSD FMLA CKD-EPI: 127 ML/MIN/1.73 M 2
GLUCOSE SERPL-MCNC: 98 MG/DL (ref 65–99)
HCT VFR BLD AUTO: 35.7 % (ref 42–52)
HGB BLD-MCNC: 12.5 G/DL (ref 14–18)
MCH RBC QN AUTO: 31.9 PG (ref 27–33)
MCHC RBC AUTO-ENTMCNC: 35 G/DL (ref 32.3–36.5)
MCV RBC AUTO: 91.1 FL (ref 81.4–97.8)
PLATELET # BLD AUTO: 350 K/UL (ref 164–446)
PMV BLD AUTO: 10.4 FL (ref 9–12.9)
POTASSIUM SERPL-SCNC: 4.6 MMOL/L (ref 3.6–5.5)
PROCALCITONIN SERPL-MCNC: 0.15 NG/ML
RBC # BLD AUTO: 3.92 M/UL (ref 4.7–6.1)
SIGNIFICANT IND 70042: NORMAL
SITE SITE: NORMAL
SODIUM SERPL-SCNC: 138 MMOL/L (ref 135–145)
SOURCE SOURCE: NORMAL
WBC # BLD AUTO: 5.7 K/UL (ref 4.8–10.8)

## 2025-06-16 PROCEDURE — A9270 NON-COVERED ITEM OR SERVICE: HCPCS | Performed by: STUDENT IN AN ORGANIZED HEALTH CARE EDUCATION/TRAINING PROGRAM

## 2025-06-16 PROCEDURE — 770020 HCHG ROOM/CARE - TELE (206)

## 2025-06-16 PROCEDURE — 84145 PROCALCITONIN (PCT): CPT

## 2025-06-16 PROCEDURE — 99233 SBSQ HOSP IP/OBS HIGH 50: CPT | Performed by: INTERNAL MEDICINE

## 2025-06-16 PROCEDURE — 700111 HCHG RX REV CODE 636 W/ 250 OVERRIDE (IP): Performed by: INTERNAL MEDICINE

## 2025-06-16 PROCEDURE — 80048 BASIC METABOLIC PNL TOTAL CA: CPT

## 2025-06-16 PROCEDURE — 700102 HCHG RX REV CODE 250 W/ 637 OVERRIDE(OP): Performed by: HOSPITALIST

## 2025-06-16 PROCEDURE — 700102 HCHG RX REV CODE 250 W/ 637 OVERRIDE(OP): Performed by: INTERNAL MEDICINE

## 2025-06-16 PROCEDURE — 36415 COLL VENOUS BLD VENIPUNCTURE: CPT

## 2025-06-16 PROCEDURE — A9270 NON-COVERED ITEM OR SERVICE: HCPCS | Performed by: HOSPITALIST

## 2025-06-16 PROCEDURE — 700102 HCHG RX REV CODE 250 W/ 637 OVERRIDE(OP): Performed by: STUDENT IN AN ORGANIZED HEALTH CARE EDUCATION/TRAINING PROGRAM

## 2025-06-16 PROCEDURE — 85027 COMPLETE CBC AUTOMATED: CPT

## 2025-06-16 PROCEDURE — A9270 NON-COVERED ITEM OR SERVICE: HCPCS | Performed by: INTERNAL MEDICINE

## 2025-06-16 PROCEDURE — 94760 N-INVAS EAR/PLS OXIMETRY 1: CPT

## 2025-06-16 RX ADMIN — PREDNISONE 40 MG: 20 TABLET ORAL at 05:39

## 2025-06-16 RX ADMIN — GUAIFENESIN 600 MG: 600 TABLET, EXTENDED RELEASE ORAL at 17:10

## 2025-06-16 RX ADMIN — METHADONE HYDROCHLORIDE 40 MG: 40 TABLET ORAL at 05:38

## 2025-06-16 RX ADMIN — GUAIFENESIN 600 MG: 600 TABLET, EXTENDED RELEASE ORAL at 05:39

## 2025-06-16 RX ADMIN — METHADONE HYDROCHLORIDE 35 MG: 10 TABLET ORAL at 05:38

## 2025-06-16 RX ADMIN — AZITHROMYCIN DIHYDRATE 500 MG: 250 TABLET ORAL at 17:10

## 2025-06-16 RX ADMIN — CITALOPRAM HYDROBROMIDE 40 MG: 20 TABLET ORAL at 05:38

## 2025-06-16 ASSESSMENT — ENCOUNTER SYMPTOMS
DEPRESSION: 0
ABDOMINAL PAIN: 0
COUGH: 1
MYALGIAS: 0
CONSTIPATION: 0
INSOMNIA: 0
DIZZINESS: 0
SPEECH CHANGE: 0
SENSORY CHANGE: 0
BLURRED VISION: 0
CHILLS: 0
DIARRHEA: 0
SHORTNESS OF BREATH: 1
VOMITING: 0
SPUTUM PRODUCTION: 1
HEADACHES: 0
WEAKNESS: 0
CLAUDICATION: 0
HEARTBURN: 0
FEVER: 0
NERVOUS/ANXIOUS: 0
PHOTOPHOBIA: 0

## 2025-06-16 ASSESSMENT — PAIN DESCRIPTION - PAIN TYPE
TYPE: ACUTE PAIN
TYPE: ACUTE PAIN

## 2025-06-16 ASSESSMENT — FIBROSIS 4 INDEX: FIB4 SCORE: 1.07

## 2025-06-16 NOTE — CARE PLAN
The patient is Stable - Low risk of patient condition declining or worsening    Shift Goals  Clinical Goals: wean O2, use IS as instructed  Patient Goals: rest    Progress made toward(s) clinical / shift goals:    Problem: Care Map:  Optimal Outcome for the Pneumonia Patient  Goal: Collection and monitoring of appropriate tests and labs  Outcome: Progressing     Problem: Respiratory  Goal: Patient will achieve/maintain optimum respiratory ventilation and gas exchange  Outcome: Progressing     Problem: Risk for Aspiration  Goal: Patient's risk for aspiration will be absent or decrease  Outcome: Progressing       Patient is not progressing towards the following goals:

## 2025-06-16 NOTE — PROGRESS NOTES
Telemetry Shift Summary     Per monitor tech:  Rhythm SR  HR Range 51-82  Ectopy None   Measurements 0.15/0.05/0.38       Normal Values  Rhythm SR  HR Range:   Measurements: 0.12-0.20/0.06-0.10/0.30-0.52

## 2025-06-16 NOTE — PROGRESS NOTES
Telemetry Shift Summary     Per monitor tech:  Rhythm SB  HR Range 49-59  Ectopy R-PAC  Measurements 0.12/0.11/0.47      Normal Values  Rhythm SR  HR Range:   Measurements: 0.12-0.20/0.06-0.10/0.30-0.52

## 2025-06-16 NOTE — DISCHARGE PLANNING
Met with pt who reports he is independent with ADLs and IADLs. Pt does not use any DMEs. No Home O2. ( Does not want home O2)    PCP none( pt not want a PCP)  Goes to [facility] pharmacy on [address].       Care Transition Team Assessment    Information Source  Orientation Level: Oriented X4  Information Given By: Patient  Who is responsible for making decisions for patient? : Patient    Readmission Evaluation  Is this a readmission?: No    Elopement Risk  Legal Hold: No  Ambulatory or Self Mobile in Wheelchair: Yes  Disoriented: No  Psychiatric Symptoms: None  History of Wandering: No  Elopement this Admit: No  Vocalizing Wanting to Leave: No  Displays Behaviors, Body Language Wanting to Leave: No-Not at Risk for Elopement  Elopement Risk: Not at Risk for Elopement    Interdisciplinary Discharge Planning  Does Admitting Nurse Feel This Could be a Complex Discharge?: No  Primary Care Physician: no PCP  Lives with - Patient's Self Care Capacity: Parents  Patient or legal guardian wants to designate a caregiver: No  Support Systems: Friends / Neighbors  Housing / Facility: [address]  Do You Take your Prescribed Medications Regularly: Yes  Able to Return to Previous ADL's: Yes  Mobility Issues: No  Prior Services: Home-Independent  Patient Prefers to be Discharged to:: Home  Assistance Needed: No    Discharge Preparedness  What is your plan after discharge?: Home with help  What are your discharge supports?: Parent  Prior Functional Level: Ambulatory, Drives Self, Independent with Activities of Daily Living, Independent with Medication Management  Difficulity with ADLs: None  Difficulity with IADLs: None    Functional Assesment  Prior Functional Level: Ambulatory, Drives Self, Independent with Activities of Daily Living, Independent with Medication Management    Finances  Financial Barriers to Discharge: No  Prescription Coverage: Yes    Vision / Hearing Impairment  Vision Impairment : No  Hearing Impairment :  No    Values / Beliefs / Concerns  Values / Beliefs Concerns : No    Advance Directive  Advance Directive?: None    Domestic Abuse  Have you ever been the victim of abuse or violence?: No  Possible Abuse/Neglect Reported to:: Not Applicable         Discharge Risks or Barriers  Discharge risks or barriers?: No  Patient risk factors: Other (comment)    Anticipated Discharge Information  Discharge Disposition: Discharged to home/self care (01)

## 2025-06-16 NOTE — CARE PLAN
The patient is Stable - Low risk of patient condition declining or worsening    Shift Goals  Clinical Goals: wean O2, monitor labs/vitals  Patient Goals: rest  Family Goals: harrison    Progress made toward(s) clinical / shift goals:      Problem: Care Map:  Optimal Outcome for the Pneumonia Patient  Goal: Collection and monitoring of appropriate tests and labs  Description: Target End Date:  end of day 1  Outcome: Progressing  Note: Patient has been between 1-1.5L nc, continuous pulse ox placed.      Problem: Knowledge Deficit - Standard  Goal: Patient and family/care givers will demonstrate understanding of plan of care, disease process/condition, diagnostic tests and medications  Description: Target End Date:  1-3 days or as soon as patient condition allows    Document in Patient Education    1.  Patient and family/caregiver oriented to unit, equipment, visitation policy and means for communicating concern  2.  Complete/review Learning Assessment  3.  Assess knowledge level of disease process/condition, treatment plan, diagnostic tests and medications  4.  Explain disease process/condition, treatment plan, diagnostic tests and medications  Outcome: Progressing  Note: Patient has been educated on plan of care, medications, and safety. No further questions from patient      Problem: Pain - Standard  Goal: Alleviation of pain or a reduction in pain to the patient’s comfort goal  Description: Target End Date:  Prior to discharge or change in level of care    Document on Vitals flowsheet    1.  Document pain using the appropriate pain scale per order or unit policy  2.  Educate and implement non-pharmacologic comfort measures (i.e. relaxation, distraction, massage, cold/heat therapy, etc.)  3.  Pain management medications as ordered  4.  Reassess pain after pain med administration per policy  5.  If opiods administered assess patient's response to pain medication is appropriate per POSS sedation scale  6.  Follow pain  management plan developed in collaboration with patient and interdisciplinary team (including palliative care or pain specialists if applicable)  Outcome: Progressing  Note: Patient has not had any complaints of pain. Pain scale and medications explained to patient        Patient is not progressing towards the following goals:

## 2025-06-17 VITALS
DIASTOLIC BLOOD PRESSURE: 64 MMHG | HEIGHT: 70 IN | SYSTOLIC BLOOD PRESSURE: 116 MMHG | HEART RATE: 55 BPM | WEIGHT: 154.98 LBS | OXYGEN SATURATION: 94 % | RESPIRATION RATE: 18 BRPM | TEMPERATURE: 97.1 F | BODY MASS INDEX: 22.19 KG/M2

## 2025-06-17 LAB
ANION GAP SERPL CALC-SCNC: 12 MMOL/L (ref 7–16)
BACTERIA SPEC RESP CULT: NORMAL
BUN SERPL-MCNC: 14 MG/DL (ref 8–22)
CALCIUM SERPL-MCNC: 8.4 MG/DL (ref 8.5–10.5)
CHLORIDE SERPL-SCNC: 102 MMOL/L (ref 96–112)
CO2 SERPL-SCNC: 23 MMOL/L (ref 20–33)
CREAT SERPL-MCNC: 0.76 MG/DL (ref 0.5–1.4)
ERYTHROCYTE [DISTWIDTH] IN BLOOD BY AUTOMATED COUNT: 39.1 FL (ref 35.9–50)
GFR SERPLBLD CREATININE-BSD FMLA CKD-EPI: 121 ML/MIN/1.73 M 2
GLUCOSE SERPL-MCNC: 85 MG/DL (ref 65–99)
GRAM STN SPEC: NORMAL
HCT VFR BLD AUTO: 34.7 % (ref 42–52)
HGB BLD-MCNC: 12.4 G/DL (ref 14–18)
MCH RBC QN AUTO: 32.3 PG (ref 27–33)
MCHC RBC AUTO-ENTMCNC: 35.7 G/DL (ref 32.3–36.5)
MCV RBC AUTO: 90.4 FL (ref 81.4–97.8)
PLATELET # BLD AUTO: 349 K/UL (ref 164–446)
PMV BLD AUTO: 10.7 FL (ref 9–12.9)
POTASSIUM SERPL-SCNC: 4.2 MMOL/L (ref 3.6–5.5)
RBC # BLD AUTO: 3.84 M/UL (ref 4.7–6.1)
SIGNIFICANT IND 70042: NORMAL
SITE SITE: NORMAL
SODIUM SERPL-SCNC: 137 MMOL/L (ref 135–145)
SOURCE SOURCE: NORMAL
WBC # BLD AUTO: 7.5 K/UL (ref 4.8–10.8)

## 2025-06-17 PROCEDURE — 85027 COMPLETE CBC AUTOMATED: CPT

## 2025-06-17 PROCEDURE — 700102 HCHG RX REV CODE 250 W/ 637 OVERRIDE(OP): Performed by: INTERNAL MEDICINE

## 2025-06-17 PROCEDURE — 99239 HOSP IP/OBS DSCHRG MGMT >30: CPT | Performed by: INTERNAL MEDICINE

## 2025-06-17 PROCEDURE — 36415 COLL VENOUS BLD VENIPUNCTURE: CPT

## 2025-06-17 PROCEDURE — A9270 NON-COVERED ITEM OR SERVICE: HCPCS | Performed by: HOSPITALIST

## 2025-06-17 PROCEDURE — 700102 HCHG RX REV CODE 250 W/ 637 OVERRIDE(OP): Performed by: STUDENT IN AN ORGANIZED HEALTH CARE EDUCATION/TRAINING PROGRAM

## 2025-06-17 PROCEDURE — A9270 NON-COVERED ITEM OR SERVICE: HCPCS | Performed by: STUDENT IN AN ORGANIZED HEALTH CARE EDUCATION/TRAINING PROGRAM

## 2025-06-17 PROCEDURE — 80048 BASIC METABOLIC PNL TOTAL CA: CPT

## 2025-06-17 PROCEDURE — A9270 NON-COVERED ITEM OR SERVICE: HCPCS | Performed by: INTERNAL MEDICINE

## 2025-06-17 PROCEDURE — 700111 HCHG RX REV CODE 636 W/ 250 OVERRIDE (IP): Performed by: INTERNAL MEDICINE

## 2025-06-17 PROCEDURE — 700102 HCHG RX REV CODE 250 W/ 637 OVERRIDE(OP): Performed by: HOSPITALIST

## 2025-06-17 PROCEDURE — 94760 N-INVAS EAR/PLS OXIMETRY 1: CPT

## 2025-06-17 RX ORDER — GUAIFENESIN 600 MG/1
600 TABLET, EXTENDED RELEASE ORAL EVERY 12 HOURS
COMMUNITY
Start: 2025-06-17

## 2025-06-17 RX ORDER — CALCIUM CARBONATE 500 MG/1
500 TABLET, CHEWABLE ORAL
Status: DISCONTINUED | OUTPATIENT
Start: 2025-06-17 | End: 2025-06-17 | Stop reason: HOSPADM

## 2025-06-17 RX ADMIN — PREDNISONE 40 MG: 20 TABLET ORAL at 05:59

## 2025-06-17 RX ADMIN — CITALOPRAM HYDROBROMIDE 40 MG: 20 TABLET ORAL at 05:59

## 2025-06-17 RX ADMIN — GUAIFENESIN 600 MG: 600 TABLET, EXTENDED RELEASE ORAL at 05:59

## 2025-06-17 RX ADMIN — HYDROCODONE BITARTRATE AND HOMATROPINE METHYLBROMIDE 5 ML: 1.5; 5 SOLUTION ORAL at 02:26

## 2025-06-17 RX ADMIN — METHADONE HYDROCHLORIDE 40 MG: 40 TABLET ORAL at 06:01

## 2025-06-17 RX ADMIN — CALCIUM CARBONATE (ANTACID) CHEW TAB 500 MG 500 MG: 500 CHEW TAB at 08:23

## 2025-06-17 RX ADMIN — METHADONE HYDROCHLORIDE 35 MG: 10 TABLET ORAL at 06:01

## 2025-06-17 ASSESSMENT — PAIN DESCRIPTION - PAIN TYPE: TYPE: ACUTE PAIN

## 2025-06-17 ASSESSMENT — FIBROSIS 4 INDEX: FIB4 SCORE: 0.9

## 2025-06-17 NOTE — PROGRESS NOTES
Overall patient is feeling better. Patient is still requiring 0.5-1 liter of supplemental O2, especially during ambulation. Patients O2 drops during ambulation, but recovers quickly when he takes a break. Patient required a dose of cough medication overnight, due to a coughing fit.

## 2025-06-17 NOTE — PROGRESS NOTES
Telemetry Shift Summary     Per monitor tech:  Rhythm SB  HR Range 49-55  Ectopy R-PVC, R-PAC  Measurements 0.11/0.10/0.48      Normal Values  Rhythm SR  HR Range:   Measurements: 0.12-0.20/0.06-0.10/0.30-0.52

## 2025-06-17 NOTE — DISCHARGE SUMMARY
Discharge Summary    CHIEF COMPLAINT ON ADMISSION  Chief Complaint   Patient presents with    Flu Like Symptoms     Cough, sob, dizziness, sore throat, chills and body aches        Reason for Admission  Shortness of breath; Dizziness; He*     Admission Date  6/13/2025    CODE STATUS  Prior    HPI & HOSPITAL COURSE  Is a 34-year-old male who came in with shortness of breath and cough for the last 2 weeks.  He came in hypoxic only 82% on room air.  Imaging consistent with multifocal pneumonia.  He has not been gardening and has not had any exposure to miracle grow soil.  He does use cocaine marijuana methamphetamine but also continues on his methadone.  Started on Unasyn and azithromycin for multifocal pneumonia.    Patient found to have mycoplasma pneumonia on respiratory PCR.  He was completing a course of azithromycin and continued to show improvement.  He initially was requiring 2 L to maintain adequate oxygen saturation however with improvement in symptoms his need for oxygen has resolved.  At this point he has completed antibiotics and steroids and will discharge home with no new prescriptions.  Patient agreeable with discharge home.    Therefore, he is discharged in good and stable condition to home with close outpatient follow-up.    The patient met 2-midnight criteria for an inpatient stay at the time of discharge.    Discharge Date  6/17/2025    FOLLOW UP ITEMS POST DISCHARGE  PCP-2 weeks    DISCHARGE DIAGNOSES  Principal Problem:    Multifocal pneumonia (POA: Yes)  Active Problems:    Anxiety (POA: Yes)    Polysubstance abuse (HCC) (POA: Yes)    Opiate dependence, continuous (HCC) (POA: Yes)    Acute respiratory failure with hypoxia (HCC) (POA: Yes)    Normochromic normocytic anemia (POA: Yes)    Nicotine use disorder (POA: Yes)  Resolved Problems:    * No resolved hospital problems. *      FOLLOW UP  No future appointments.  No follow-up provider specified.    MEDICATIONS ON DISCHARGE     Medication List         START taking these medications        Instructions   guaiFENesin  MG Tb12  Commonly known as: Mucinex   Take 1 Tablet by mouth every 12 hours.  Dose: 600 mg            CONTINUE taking these medications        Instructions   CeleXA 20 MG Tabs  Generic drug: citalopram   Take 40 mg by mouth every day.  Dose: 40 mg     methadone 10 MG/ML Conc  Commonly known as: Dolophine   Take 75 mg by mouth every day.  Dose: 75 mg              Allergies  Allergies[1]    DIET  No orders of the defined types were placed in this encounter.      ACTIVITY  As tolerated.  Weight bearing as tolerated    CONSULTATIONS  None    PROCEDURES  None    LABORATORY  Lab Results   Component Value Date    SODIUM 137 06/17/2025    POTASSIUM 4.2 06/17/2025    CHLORIDE 102 06/17/2025    CO2 23 06/17/2025    GLUCOSE 85 06/17/2025    BUN 14 06/17/2025    CREATININE 0.76 06/17/2025        Lab Results   Component Value Date    WBC 7.5 06/17/2025    HEMOGLOBIN 12.4 (L) 06/17/2025    HEMATOCRIT 34.7 (L) 06/17/2025    PLATELETCT 349 06/17/2025        Total time of the discharge process exceeds 35 minutes.       [1] No Known Allergies

## 2025-06-17 NOTE — PROGRESS NOTES
Telemetry Shift Summary     Rhythm: SB  Rate: 50-59  Measurements: .16/.08/.45  Ectopy (reported by Monitor Tech): r coup, down to 44 bpm     Normal Values  Rhythm: Sinus  HR:   Measurements: 0.12-0.20/0.06-0.10/0.30-0.52

## 2025-06-17 NOTE — CARE PLAN
The patient is Stable - Low risk of patient condition declining or worsening    Shift Goals  Clinical Goals: Wean O2, monitor Vitals  Patient Goals: rest, go home  Family Goals: Radha    Progress made toward(s) clinical / shift goals:    Problem: Respiratory  Goal: Patient will achieve/maintain optimum respiratory ventilation and gas exchange  Description: Target End Date:  Prior to discharge or change in level of care    Document on Assessment flowsheet    1.  Assess and monitor rate, rhythm, depth and effort of respiration  2.  Breath sounds assessed qshift and/or as needed  3.  Assess O2 saturation, administer/titrate oxygen as ordered  4.  Position patient for maximum ventilatory efficiency  5.  Turn, cough, and deep breath with splinting to improve effectiveness  6.  Collaborate with RT to administer medication/treatments per order  7.  Encourage use of incentive spirometer and encourage patient to cough after use and utilize splinting techniques if applicable  8.  Airway suctioning  9.  Monitor sputum production for changes in color, consistency and frequency  10. Perform frequent oral hygiene  11. Alternate physical activity with rest periods  Outcome: Progressing  Note: Patient requires minimal O2. Patient using his IS and reached 1700. Patient is improving.      Problem: Self Care  Goal: Patient will have the ability to perform ADLs independently or with assistance (bathe, groom, dress, toilet and feed)  Description: Target End Date:  Prior to discharge or change in level of care    Document on ADL flowsheet    1.  Assess the capability and level of deficiency to perform ADLs  2.  Encourage family/care giver involvement  3.  Provide assistive devices  4.  Consider PT/OT evaluations  5.  Maintain support, give positive feedback, encourage self-care allowing extra time and verbal cuing as needed  6.  Avoid doing something for patients they can do themselves, but provide assistance as needed  7.  Assist in  anticipating/planning individual needs  8.  Collaborate with Case Management and  to meet discharge needs  Outcome: Progressing  Note: Patient completes all self care unassisted      Problem: Knowledge Deficit - Standard  Goal: Patient and family/care givers will demonstrate understanding of plan of care, disease process/condition, diagnostic tests and medications  Description: Target End Date:  1-3 days or as soon as patient condition allows    Document in Patient Education    1.  Patient and family/caregiver oriented to unit, equipment, visitation policy and means for communicating concern  2.  Complete/review Learning Assessment  3.  Assess knowledge level of disease process/condition, treatment plan, diagnostic tests and medications  4.  Explain disease process/condition, treatment plan, diagnostic tests and medications  Note: Plan of care discussed with the patient. Patient understands the plan of care.      Problem: Pain - Standard  Goal: Alleviation of pain or a reduction in pain to the patient’s comfort goal  Description: Target End Date:  Prior to discharge or change in level of care    Document on Vitals flowsheet    1.  Document pain using the appropriate pain scale per order or unit policy  2.  Educate and implement non-pharmacologic comfort measures (i.e. relaxation, distraction, massage, cold/heat therapy, etc.)  3.  Pain management medications as ordered  4.  Reassess pain after pain med administration per policy  5.  If opiods administered assess patient's response to pain medication is appropriate per POSS sedation scale  6.  Follow pain management plan developed in collaboration with patient and interdisciplinary team (including palliative care or pain specialists if applicable)  Outcome: Progressing  Flowsheets (Taken 6/16/2025 1948)  Pain Rating Scale (NPRS): 0  Note: Patient did not did not required pain medications.        Patient is not progressing towards the following  goals:

## 2025-06-17 NOTE — PROGRESS NOTES
D/C paperwork reviewed with patient, iv removed, tele box removed, patient's home meds given to patient, safe keeping items given to patient, and all belongings with patient. Patient escorted to relative's car to dc home.

## 2025-06-18 LAB
BACTERIA BLD CULT: NORMAL
BACTERIA BLD CULT: NORMAL
SIGNIFICANT IND 70042: NORMAL
SIGNIFICANT IND 70042: NORMAL
SITE SITE: NORMAL
SITE SITE: NORMAL
SOURCE SOURCE: NORMAL
SOURCE SOURCE: NORMAL

## 2025-08-14 ENCOUNTER — NON-PROVIDER VISIT (OUTPATIENT)
Dept: URGENT CARE | Facility: CLINIC | Age: 35
End: 2025-08-14
Payer: MEDICAID

## 2025-08-14 DIAGNOSIS — Z11.1 PPD SCREENING TEST: Primary | ICD-10-CM

## 2025-08-14 PROCEDURE — 86580 TB INTRADERMAL TEST: CPT

## 2025-08-16 ENCOUNTER — NON-PROVIDER VISIT (OUTPATIENT)
Dept: URGENT CARE | Facility: CLINIC | Age: 35
End: 2025-08-16

## 2025-08-16 LAB — TB WHEAL 3D P 5 TU DIAM: NORMAL MM

## 2025-08-16 PROCEDURE — 99999 PR NO CHARGE: CPT | Performed by: NURSE PRACTITIONER

## (undated) DEVICE — MASK ANESTHESIA ADULT  - (100/CA)

## (undated) DEVICE — GOWN WARMING STANDARD FLEX - (30/CA)

## (undated) DEVICE — TUBING CLEARLINK DUO-VENT - C-FLO (48EA/CA)

## (undated) DEVICE — PROTECTOR ULNA NERVE - (36PR/CA)

## (undated) DEVICE — RESERVOIR SUCTION 100 CC - SILICONE (20EA/CA)

## (undated) DEVICE — GLOVE BIOGEL SZ 7.5 SURGICAL PF LTX - (50PR/BX 4BX/CA)

## (undated) DEVICE — DRAPE SURG STERI-DRAPE 7X11OD - (40EA/CA)

## (undated) DEVICE — STAPLER SKIN DISP - (6/BX 10BX/CA) VISISTAT

## (undated) DEVICE — SUCTION INSTRUMENT YANKAUER BULBOUS TIP W/O VENT (50EA/CA)

## (undated) DEVICE — SUTURE 3-0 ETHILON FS-1 - (36/BX) 30 INCH

## (undated) DEVICE — SODIUM CHL. IRRIGATION 0.9% 3000ML (4EA/CA 65CA/PF)

## (undated) DEVICE — PADDING CAST 6 IN STERILE - 6 X 4 YDS (24/CA)

## (undated) DEVICE — ELECTRODE 850 FOAM ADHESIVE - HYDROGEL RADIOTRNSPRNT (50/PK)

## (undated) DEVICE — DRAPE SURGICAL U 77X120 - (10/CA)

## (undated) DEVICE — CUP DENTURE W/ LID - (200/CA)

## (undated) DEVICE — PACK UPPER EXTREMITY (2EA/CA)

## (undated) DEVICE — SET IRRIGATION CYSTOSCOPY TUBE L80 IN (20EA/CA)

## (undated) DEVICE — SET EXTENSION WITH 2 PORTS (48EA/CA) ***PART #2C8610 IS A SUBSTITUTE*****

## (undated) DEVICE — SLEEVE, VASO, THIGH, MED

## (undated) DEVICE — GLOVE BIOGEL PI ORTHO SZ 7.5 PF LF (40PR/BX)

## (undated) DEVICE — SET LEADWIRE 5 LEAD BEDSIDE DISPOSABLE ECG (1SET OF 5/EA)

## (undated) DEVICE — ELECTRODE DUAL RETURN W/ CORD - (50/PK)

## (undated) DEVICE — PADDING CAST 4 IN STERILE - 4 X 4 YDS (24/CA)

## (undated) DEVICE — SWAB ANAEROBIC SPEC.COLLECTOR - (25/PK 4PK/CA 100EA/CA)

## (undated) DEVICE — TRAY SKIN SCRUB PVP WET (20EA/CA) PART #DYND70356 DISCONTINUED

## (undated) DEVICE — TIP INTPLS HFLO ML ORFC BTRY - (12/CS)  FOR SURGILAV

## (undated) DEVICE — NEPTUNE 4 PORT MANIFOLD - (20/PK)

## (undated) DEVICE — BANDAGE ELASTIC 6 HONEYCOMB - 6X5YD LF (20/CA)"

## (undated) DEVICE — GLOVE BIOGEL PI ORTHO SZ 7 PF LF (40PR/BX)

## (undated) DEVICE — PACK LOWER EXTREMITY - (2/CA)

## (undated) DEVICE — HEAD HOLDER JUNIOR/ADULT

## (undated) DEVICE — LACTATED RINGERS INJ 1000 ML - (14EA/CA 60CA/PF)

## (undated) DEVICE — DRESSING ABDOMINAL PAD STERILE 8 X 10" (360EA/CA)"

## (undated) DEVICE — WRAP COBAN SELF-ADHERENT 6 IN X  5YDS STERILE TAN (12/CA)

## (undated) DEVICE — GLOVE BIOGEL SZ 8 SURGICAL PF LTX - (50PR/BX 4BX/CA)

## (undated) DEVICE — BANDAGE ELASTIC 4 HONEYCOMB - 4"X5YD LF (20/CA)"

## (undated) DEVICE — SUTURE ETHILON 2-0 FSLX 30 (36PK/BX)"

## (undated) DEVICE — WATER IRRIGATION STERILE 1000ML (12EA/CA)

## (undated) DEVICE — SODIUM CHL IRRIGATION 0.9% 1000ML (12EA/CA)

## (undated) DEVICE — TOURNIQUET CUFF 24 X 4 ONE PORT - STERILE (10/BX)

## (undated) DEVICE — SENSOR SPO2 NEO LNCS ADHESIVE (20/BX) SEE USER NOTES

## (undated) DEVICE — DRAIN JACKSON PRATT 15FR - (10EA/CA)

## (undated) DEVICE — SUTURE GENERAL

## (undated) DEVICE — SUTURE 0 VICRYL PLUS CT-1 - 36 INCH (36/BX)

## (undated) DEVICE — PAD LAP STERILE 18 X 18 - (5/PK 40PK/CA)

## (undated) DEVICE — GLOVE BIOGEL INDICATOR SZ 7.5 SURGICAL PF LTX - (50PR/BX 4BX/CA)

## (undated) DEVICE — BOVIE BLADE COATED - (50/PK)

## (undated) DEVICE — KIT ANESTHESIA W/CIRCUIT & 3/LT BAG W/FILTER (20EA/CA)

## (undated) DEVICE — HANDPIECE 10FT INTPLS SCT PLS IRRIGATION HAND CONTROL SET (6/PK)

## (undated) DEVICE — SUTURE 2-0 PDS II CT-2 - (36/BX)

## (undated) DEVICE — SUTURE 2-0 VICRYL PLUS CT-1 36 (36PK/BX)"

## (undated) DEVICE — GLOVE BIOGEL INDICATOR SZ 8 SURGICAL PF LTX - (50/BX 4BX/CA)

## (undated) DEVICE — CANISTER SUCTION 3000ML MECHANICAL FILTER AUTO SHUTOFF MEDI-VAC NONSTERILE LF DISP  (40EA/CA)